# Patient Record
Sex: MALE | Race: OTHER | HISPANIC OR LATINO
[De-identification: names, ages, dates, MRNs, and addresses within clinical notes are randomized per-mention and may not be internally consistent; named-entity substitution may affect disease eponyms.]

---

## 2017-11-13 ENCOUNTER — APPOINTMENT (OUTPATIENT)
Dept: PULMONOLOGY | Facility: CLINIC | Age: 72
End: 2017-11-13
Payer: MEDICARE

## 2017-11-13 VITALS
SYSTOLIC BLOOD PRESSURE: 142 MMHG | OXYGEN SATURATION: 96 % | HEART RATE: 76 BPM | DIASTOLIC BLOOD PRESSURE: 78 MMHG | WEIGHT: 135 LBS | HEIGHT: 63 IN | BODY MASS INDEX: 23.92 KG/M2

## 2017-11-13 DIAGNOSIS — Z86.39 PERSONAL HISTORY OF OTHER ENDOCRINE, NUTRITIONAL AND METABOLIC DISEASE: ICD-10-CM

## 2017-11-13 DIAGNOSIS — Z82.0 FAMILY HISTORY OF EPILEPSY AND OTHER DISEASES OF THE NERVOUS SYSTEM: ICD-10-CM

## 2017-11-13 DIAGNOSIS — Z86.79 PERSONAL HISTORY OF OTHER DISEASES OF THE CIRCULATORY SYSTEM: ICD-10-CM

## 2017-11-13 DIAGNOSIS — Z82.49 FAMILY HISTORY OF ISCHEMIC HEART DISEASE AND OTHER DISEASES OF THE CIRCULATORY SYSTEM: ICD-10-CM

## 2017-11-13 PROCEDURE — 99215 OFFICE O/P EST HI 40 MIN: CPT | Mod: 25

## 2017-11-13 PROCEDURE — 94010 BREATHING CAPACITY TEST: CPT

## 2017-11-13 PROCEDURE — 94727 GAS DIL/WSHOT DETER LNG VOL: CPT

## 2017-11-13 PROCEDURE — 94664 DEMO&/EVAL PT USE INHALER: CPT

## 2017-11-13 PROCEDURE — 85018 HEMOGLOBIN: CPT | Mod: QW

## 2017-12-17 ENCOUNTER — FORM ENCOUNTER (OUTPATIENT)
Age: 72
End: 2017-12-17

## 2017-12-18 ENCOUNTER — APPOINTMENT (OUTPATIENT)
Dept: CT IMAGING | Facility: CLINIC | Age: 72
End: 2017-12-18
Payer: MEDICARE

## 2017-12-18 ENCOUNTER — OUTPATIENT (OUTPATIENT)
Dept: OUTPATIENT SERVICES | Facility: HOSPITAL | Age: 72
LOS: 1 days | End: 2017-12-18
Payer: MEDICARE

## 2017-12-18 DIAGNOSIS — J84.10 PULMONARY FIBROSIS, UNSPECIFIED: ICD-10-CM

## 2017-12-18 PROCEDURE — 71250 CT THORAX DX C-: CPT

## 2017-12-18 PROCEDURE — 71250 CT THORAX DX C-: CPT | Mod: 26

## 2018-02-12 ENCOUNTER — APPOINTMENT (OUTPATIENT)
Dept: PULMONOLOGY | Facility: CLINIC | Age: 73
End: 2018-02-12
Payer: MEDICARE

## 2018-02-12 VITALS
OXYGEN SATURATION: 96 % | WEIGHT: 140 LBS | BODY MASS INDEX: 24.8 KG/M2 | DIASTOLIC BLOOD PRESSURE: 82 MMHG | HEART RATE: 87 BPM | SYSTOLIC BLOOD PRESSURE: 122 MMHG

## 2018-02-12 PROCEDURE — 99214 OFFICE O/P EST MOD 30 MIN: CPT

## 2018-03-13 ENCOUNTER — CHART COPY (OUTPATIENT)
Age: 73
End: 2018-03-13

## 2018-05-15 ENCOUNTER — CHART COPY (OUTPATIENT)
Age: 73
End: 2018-05-15

## 2018-08-27 ENCOUNTER — OTHER (OUTPATIENT)
Age: 73
End: 2018-08-27

## 2020-11-10 ENCOUNTER — APPOINTMENT (OUTPATIENT)
Dept: PULMONOLOGY | Facility: CLINIC | Age: 75
End: 2020-11-10
Payer: MEDICARE

## 2020-11-10 VITALS — RESPIRATION RATE: 16 BRPM | HEART RATE: 105 BPM | OXYGEN SATURATION: 93 %

## 2020-11-10 VITALS — DIASTOLIC BLOOD PRESSURE: 70 MMHG | SYSTOLIC BLOOD PRESSURE: 120 MMHG | BODY MASS INDEX: 20.02 KG/M2 | WEIGHT: 113 LBS

## 2020-11-10 PROCEDURE — 99215 OFFICE O/P EST HI 40 MIN: CPT

## 2020-12-10 ENCOUNTER — APPOINTMENT (OUTPATIENT)
Dept: PULMONOLOGY | Facility: CLINIC | Age: 75
End: 2020-12-10

## 2020-12-15 ENCOUNTER — APPOINTMENT (OUTPATIENT)
Dept: PULMONOLOGY | Facility: CLINIC | Age: 75
End: 2020-12-15

## 2020-12-15 ENCOUNTER — NON-APPOINTMENT (OUTPATIENT)
Age: 75
End: 2020-12-15

## 2021-01-06 ENCOUNTER — APPOINTMENT (OUTPATIENT)
Dept: PULMONOLOGY | Facility: CLINIC | Age: 76
End: 2021-01-06
Payer: MEDICARE

## 2021-01-06 VITALS — HEART RATE: 85 BPM | OXYGEN SATURATION: 95 %

## 2021-01-06 PROCEDURE — 99214 OFFICE O/P EST MOD 30 MIN: CPT

## 2021-01-08 ENCOUNTER — OUTPATIENT (OUTPATIENT)
Dept: OUTPATIENT SERVICES | Facility: HOSPITAL | Age: 76
LOS: 1 days | End: 2021-01-08
Payer: MEDICARE

## 2021-01-08 ENCOUNTER — APPOINTMENT (OUTPATIENT)
Dept: CT IMAGING | Facility: CLINIC | Age: 76
End: 2021-01-08
Payer: MEDICARE

## 2021-01-08 DIAGNOSIS — J84.10 PULMONARY FIBROSIS, UNSPECIFIED: ICD-10-CM

## 2021-01-08 PROCEDURE — 71250 CT THORAX DX C-: CPT

## 2021-01-08 PROCEDURE — 71250 CT THORAX DX C-: CPT | Mod: 26

## 2021-02-17 ENCOUNTER — APPOINTMENT (OUTPATIENT)
Dept: PULMONOLOGY | Facility: CLINIC | Age: 76
End: 2021-02-17

## 2021-03-03 ENCOUNTER — APPOINTMENT (OUTPATIENT)
Dept: PULMONOLOGY | Facility: CLINIC | Age: 76
End: 2021-03-03

## 2021-03-10 ENCOUNTER — APPOINTMENT (OUTPATIENT)
Dept: PULMONOLOGY | Facility: CLINIC | Age: 76
End: 2021-03-10
Payer: MEDICARE

## 2021-03-10 VITALS — SYSTOLIC BLOOD PRESSURE: 116 MMHG | DIASTOLIC BLOOD PRESSURE: 64 MMHG

## 2021-03-10 VITALS — WEIGHT: 107 LBS | BODY MASS INDEX: 18.95 KG/M2

## 2021-03-10 VITALS — HEART RATE: 85 BPM | OXYGEN SATURATION: 93 %

## 2021-03-10 PROCEDURE — 99214 OFFICE O/P EST MOD 30 MIN: CPT

## 2021-04-20 ENCOUNTER — NON-APPOINTMENT (OUTPATIENT)
Age: 76
End: 2021-04-20

## 2021-04-23 ENCOUNTER — APPOINTMENT (OUTPATIENT)
Dept: PULMONOLOGY | Facility: CLINIC | Age: 76
End: 2021-04-23

## 2021-11-07 ENCOUNTER — INPATIENT (INPATIENT)
Facility: HOSPITAL | Age: 76
LOS: 7 days | Discharge: NURSING, REHAB OR LONG-TERM CARE | End: 2021-11-15
Attending: HOSPITALIST | Admitting: HOSPITALIST
Payer: MEDICARE

## 2021-11-07 VITALS
TEMPERATURE: 98 F | SYSTOLIC BLOOD PRESSURE: 120 MMHG | HEART RATE: 114 BPM | DIASTOLIC BLOOD PRESSURE: 84 MMHG | OXYGEN SATURATION: 98 % | RESPIRATION RATE: 30 BRPM

## 2021-11-07 DIAGNOSIS — R06.02 SHORTNESS OF BREATH: ICD-10-CM

## 2021-11-07 LAB
ALBUMIN SERPL ELPH-MCNC: 3.8 G/DL — SIGNIFICANT CHANGE UP (ref 3.3–5)
ALP SERPL-CCNC: 71 U/L — SIGNIFICANT CHANGE UP (ref 40–120)
ALT FLD-CCNC: 12 U/L — SIGNIFICANT CHANGE UP (ref 4–41)
ANION GAP SERPL CALC-SCNC: 11 MMOL/L — SIGNIFICANT CHANGE UP (ref 7–14)
AST SERPL-CCNC: 18 U/L — SIGNIFICANT CHANGE UP (ref 4–40)
B PERT DNA SPEC QL NAA+PROBE: SIGNIFICANT CHANGE UP
B PERT+PARAPERT DNA PNL SPEC NAA+PROBE: SIGNIFICANT CHANGE UP
BASE EXCESS BLDV CALC-SCNC: 4.8 MMOL/L — HIGH (ref -2–3)
BASE EXCESS BLDV CALC-SCNC: 8.1 MMOL/L — HIGH (ref -2–3)
BASOPHILS # BLD AUTO: 0.03 K/UL — SIGNIFICANT CHANGE UP (ref 0–0.2)
BASOPHILS NFR BLD AUTO: 0.3 % — SIGNIFICANT CHANGE UP (ref 0–2)
BILIRUB SERPL-MCNC: 0.6 MG/DL — SIGNIFICANT CHANGE UP (ref 0.2–1.2)
BLOOD GAS ARTERIAL COMPREHENSIVE RESULT: SIGNIFICANT CHANGE UP
BLOOD GAS VENOUS COMPREHENSIVE RESULT: SIGNIFICANT CHANGE UP
BLOOD GAS VENOUS COMPREHENSIVE RESULT: SIGNIFICANT CHANGE UP
BORDETELLA PARAPERTUSSIS (RAPRVP): SIGNIFICANT CHANGE UP
BUN SERPL-MCNC: 18 MG/DL — SIGNIFICANT CHANGE UP (ref 7–23)
C PNEUM DNA SPEC QL NAA+PROBE: SIGNIFICANT CHANGE UP
CALCIUM SERPL-MCNC: 9.2 MG/DL — SIGNIFICANT CHANGE UP (ref 8.4–10.5)
CHLORIDE BLDV-SCNC: 103 MMOL/L — SIGNIFICANT CHANGE UP (ref 96–108)
CHLORIDE BLDV-SCNC: 99 MMOL/L — SIGNIFICANT CHANGE UP (ref 96–108)
CHLORIDE SERPL-SCNC: 102 MMOL/L — SIGNIFICANT CHANGE UP (ref 98–107)
CO2 BLDV-SCNC: 33 MMOL/L — HIGH (ref 22–26)
CO2 BLDV-SCNC: 37.3 MMOL/L — HIGH (ref 22–26)
CO2 SERPL-SCNC: 29 MMOL/L — SIGNIFICANT CHANGE UP (ref 22–31)
CREAT SERPL-MCNC: 0.78 MG/DL — SIGNIFICANT CHANGE UP (ref 0.5–1.3)
EOSINOPHIL # BLD AUTO: 0.17 K/UL — SIGNIFICANT CHANGE UP (ref 0–0.5)
EOSINOPHIL NFR BLD AUTO: 1.9 % — SIGNIFICANT CHANGE UP (ref 0–6)
FLUAV SUBTYP SPEC NAA+PROBE: SIGNIFICANT CHANGE UP
FLUBV RNA SPEC QL NAA+PROBE: SIGNIFICANT CHANGE UP
GAS PNL BLDV: 136 MMOL/L — SIGNIFICANT CHANGE UP (ref 136–145)
GAS PNL BLDV: 139 MMOL/L — SIGNIFICANT CHANGE UP (ref 136–145)
GLUCOSE BLDV-MCNC: 122 MG/DL — HIGH (ref 70–99)
GLUCOSE BLDV-MCNC: 151 MG/DL — HIGH (ref 70–99)
GLUCOSE SERPL-MCNC: 125 MG/DL — HIGH (ref 70–99)
HADV DNA SPEC QL NAA+PROBE: SIGNIFICANT CHANGE UP
HCO3 BLDV-SCNC: 31 MMOL/L — HIGH (ref 22–29)
HCO3 BLDV-SCNC: 36 MMOL/L — HIGH (ref 22–29)
HCOV 229E RNA SPEC QL NAA+PROBE: SIGNIFICANT CHANGE UP
HCOV HKU1 RNA SPEC QL NAA+PROBE: SIGNIFICANT CHANGE UP
HCOV NL63 RNA SPEC QL NAA+PROBE: SIGNIFICANT CHANGE UP
HCOV OC43 RNA SPEC QL NAA+PROBE: SIGNIFICANT CHANGE UP
HCT VFR BLD CALC: 42.6 % — SIGNIFICANT CHANGE UP (ref 39–50)
HCT VFR BLDA CALC: 43 % — SIGNIFICANT CHANGE UP (ref 39–51)
HCT VFR BLDA CALC: 43 % — SIGNIFICANT CHANGE UP (ref 39–51)
HGB BLD CALC-MCNC: 14.2 G/DL — SIGNIFICANT CHANGE UP (ref 13–17)
HGB BLD CALC-MCNC: 14.3 G/DL — SIGNIFICANT CHANGE UP (ref 13–17)
HGB BLD-MCNC: 14.1 G/DL — SIGNIFICANT CHANGE UP (ref 13–17)
HMPV RNA SPEC QL NAA+PROBE: SIGNIFICANT CHANGE UP
HPIV1 RNA SPEC QL NAA+PROBE: SIGNIFICANT CHANGE UP
HPIV2 RNA SPEC QL NAA+PROBE: SIGNIFICANT CHANGE UP
HPIV3 RNA SPEC QL NAA+PROBE: SIGNIFICANT CHANGE UP
HPIV4 RNA SPEC QL NAA+PROBE: SIGNIFICANT CHANGE UP
IANC: 6.83 K/UL — SIGNIFICANT CHANGE UP (ref 1.5–8.5)
IMM GRANULOCYTES NFR BLD AUTO: 0.3 % — SIGNIFICANT CHANGE UP (ref 0–1.5)
LACTATE BLDV-MCNC: 1.8 MMOL/L — SIGNIFICANT CHANGE UP (ref 0.5–2)
LACTATE BLDV-MCNC: 3 MMOL/L — HIGH (ref 0.5–2)
LYMPHOCYTES # BLD AUTO: 1.08 K/UL — SIGNIFICANT CHANGE UP (ref 1–3.3)
LYMPHOCYTES # BLD AUTO: 12.2 % — LOW (ref 13–44)
M PNEUMO DNA SPEC QL NAA+PROBE: SIGNIFICANT CHANGE UP
MAGNESIUM SERPL-MCNC: 1.9 MG/DL — SIGNIFICANT CHANGE UP (ref 1.6–2.6)
MCHC RBC-ENTMCNC: 30.3 PG — SIGNIFICANT CHANGE UP (ref 27–34)
MCHC RBC-ENTMCNC: 33.1 GM/DL — SIGNIFICANT CHANGE UP (ref 32–36)
MCV RBC AUTO: 91.4 FL — SIGNIFICANT CHANGE UP (ref 80–100)
MONOCYTES # BLD AUTO: 0.71 K/UL — SIGNIFICANT CHANGE UP (ref 0–0.9)
MONOCYTES NFR BLD AUTO: 8 % — SIGNIFICANT CHANGE UP (ref 2–14)
NEUTROPHILS # BLD AUTO: 6.83 K/UL — SIGNIFICANT CHANGE UP (ref 1.8–7.4)
NEUTROPHILS NFR BLD AUTO: 77.3 % — HIGH (ref 43–77)
NRBC # BLD: 0 /100 WBCS — SIGNIFICANT CHANGE UP
NRBC # FLD: 0 K/UL — SIGNIFICANT CHANGE UP
PCO2 BLDV: 53 MMHG — SIGNIFICANT CHANGE UP (ref 42–55)
PCO2 BLDV: 60 MMHG — HIGH (ref 42–55)
PH BLDV: 7.38 — SIGNIFICANT CHANGE UP (ref 7.32–7.43)
PH BLDV: 7.38 — SIGNIFICANT CHANGE UP (ref 7.32–7.43)
PHOSPHATE SERPL-MCNC: 3.2 MG/DL — SIGNIFICANT CHANGE UP (ref 2.5–4.5)
PLATELET # BLD AUTO: 198 K/UL — SIGNIFICANT CHANGE UP (ref 150–400)
PO2 BLDV: 23 MMHG — SIGNIFICANT CHANGE UP
PO2 BLDV: 43 MMHG — SIGNIFICANT CHANGE UP
POTASSIUM BLDV-SCNC: 3.6 MMOL/L — SIGNIFICANT CHANGE UP (ref 3.5–5.1)
POTASSIUM BLDV-SCNC: 3.9 MMOL/L — SIGNIFICANT CHANGE UP (ref 3.5–5.1)
POTASSIUM SERPL-MCNC: 4.2 MMOL/L — SIGNIFICANT CHANGE UP (ref 3.5–5.3)
POTASSIUM SERPL-SCNC: 4.2 MMOL/L — SIGNIFICANT CHANGE UP (ref 3.5–5.3)
PROT SERPL-MCNC: 7.1 G/DL — SIGNIFICANT CHANGE UP (ref 6–8.3)
RAPID RVP RESULT: SIGNIFICANT CHANGE UP
RBC # BLD: 4.66 M/UL — SIGNIFICANT CHANGE UP (ref 4.2–5.8)
RBC # FLD: 12.5 % — SIGNIFICANT CHANGE UP (ref 10.3–14.5)
RSV RNA SPEC QL NAA+PROBE: SIGNIFICANT CHANGE UP
RV+EV RNA SPEC QL NAA+PROBE: SIGNIFICANT CHANGE UP
SAO2 % BLDV: 36.2 % — SIGNIFICANT CHANGE UP
SAO2 % BLDV: 75.1 % — SIGNIFICANT CHANGE UP
SARS-COV-2 RNA SPEC QL NAA+PROBE: SIGNIFICANT CHANGE UP
SODIUM SERPL-SCNC: 142 MMOL/L — SIGNIFICANT CHANGE UP (ref 135–145)
TROPONIN T, HIGH SENSITIVITY RESULT: 14 NG/L — SIGNIFICANT CHANGE UP
TROPONIN T, HIGH SENSITIVITY RESULT: 15 NG/L — SIGNIFICANT CHANGE UP
WBC # BLD: 8.85 K/UL — SIGNIFICANT CHANGE UP (ref 3.8–10.5)
WBC # FLD AUTO: 8.85 K/UL — SIGNIFICANT CHANGE UP (ref 3.8–10.5)

## 2021-11-07 PROCEDURE — 99223 1ST HOSP IP/OBS HIGH 75: CPT

## 2021-11-07 PROCEDURE — 71275 CT ANGIOGRAPHY CHEST: CPT | Mod: 26,MA

## 2021-11-07 PROCEDURE — 99285 EMERGENCY DEPT VISIT HI MDM: CPT | Mod: CS,25

## 2021-11-07 PROCEDURE — 71045 X-RAY EXAM CHEST 1 VIEW: CPT | Mod: 26

## 2021-11-07 PROCEDURE — 93010 ELECTROCARDIOGRAM REPORT: CPT

## 2021-11-07 RX ORDER — HALOPERIDOL DECANOATE 100 MG/ML
2.5 INJECTION INTRAMUSCULAR ONCE
Refills: 0 | Status: COMPLETED | OUTPATIENT
Start: 2021-11-07 | End: 2021-11-07

## 2021-11-07 RX ORDER — IPRATROPIUM/ALBUTEROL SULFATE 18-103MCG
3 AEROSOL WITH ADAPTER (GRAM) INHALATION ONCE
Refills: 0 | Status: COMPLETED | OUTPATIENT
Start: 2021-11-07 | End: 2021-11-07

## 2021-11-07 RX ORDER — FUROSEMIDE 40 MG
40 TABLET ORAL ONCE
Refills: 0 | Status: COMPLETED | OUTPATIENT
Start: 2021-11-07 | End: 2021-11-07

## 2021-11-07 RX ORDER — HALOPERIDOL DECANOATE 100 MG/ML
2.5 INJECTION INTRAMUSCULAR ONCE
Refills: 0 | Status: DISCONTINUED | OUTPATIENT
Start: 2021-11-07 | End: 2021-11-07

## 2021-11-07 RX ADMIN — Medication 40 MILLIGRAM(S): at 15:18

## 2021-11-07 RX ADMIN — HALOPERIDOL DECANOATE 2.5 MILLIGRAM(S): 100 INJECTION INTRAMUSCULAR at 19:47

## 2021-11-07 RX ADMIN — Medication 3 MILLILITER(S): at 14:55

## 2021-11-07 RX ADMIN — HALOPERIDOL DECANOATE 2.5 MILLIGRAM(S): 100 INJECTION INTRAMUSCULAR at 19:30

## 2021-11-07 RX ADMIN — HALOPERIDOL DECANOATE 2.5 MILLIGRAM(S): 100 INJECTION INTRAMUSCULAR at 20:24

## 2021-11-07 RX ADMIN — HALOPERIDOL DECANOATE 2.5 MILLIGRAM(S): 100 INJECTION INTRAMUSCULAR at 21:19

## 2021-11-07 NOTE — ED PROVIDER NOTE - PHYSICAL EXAMINATION
GENERAL: Awake, alert, cachectic   HEENT: NC/AT, moist mucous membranes, PERRL, EOMI  LUNGS: CTAB, no wheezes or crackles, tachypneic, increased WOB  CARDIAC: Tachycardic, no m/r/g  ABDOMEN: Soft, normal BS, non tender, non distended, no rebound, no guarding  BACK: No midline spinal tenderness, no CVA tenderness  EXT: No edema, no calf tenderness, 2+ DP pulses bilaterally, no deformities.  NEURO: A&Ox3. Moving all extremities.  SKIN: Warm and dry. No rash.  PSYCH: Normal affect.

## 2021-11-07 NOTE — ED PROVIDER NOTE - ATTENDING CONTRIBUTION TO CARE
75M w h/o copd, htn, mdd, bph, GERD p/w 2 days of worsening shortness of breath. As per daughter at bedside, patient nursing home [facility] for a unknown 'lung issue.' States patient left AMA 2 days ago. States patient ran out of home O2 today and acutely had worsening of sob today, prompting ED visit. Denies chest pain, n/v/d, abdominal pain, lower extremity swelling.     Except as documented in the HPI,  all other systems are negative    CONSTITUTIONAL: cachetic appearing, awake, alert  HEAD: Normocephalic; atraumatic  ENMT: External appears normal, MMM  NECK: no tenderness, FROM  CARD: Normal Sl, S2; no audible murmurs  RESP: tachypnea, sating well on 6L, coarse breath sounds throughout   ABD: soft, non-distended; non-tender  MSK: no edema, normal ROM in all four extremities  SKIN: Warm, dry, no rashes  NEURO: aaox3, moving all extremities spontaneously    75M w h/o copd, htn, mdd, bph, GERD p/w 2 days of worsening shortness of breath, unclear what his lung disease is but patient w/ coarse breath sounds throughout, will cta to r/o PE and assess for interstitial lung disease, acs workup, no neuro deficits, no chest pain, pulses intact, low suspicion for dissection, no pedal edema

## 2021-11-07 NOTE — ED ADULT TRIAGE NOTE - CHIEF COMPLAINT QUOTE
Pt. brought in by family for SOB. States they took him from [facility] to see family today and noticed his oxygen tank ran out. Pt. arrived tachypneic and sating 65 on RA. Placed on 6L NC and now sating 98. Family asking to speak with .     Scarlett Bryant (daughter) [phone]  Dmitryjoni Manny (daughter) [phone]

## 2021-11-07 NOTE — ED ADULT NURSE REASSESSMENT NOTE - NS ED NURSE REASSESS COMMENT FT1
awake cooperative but keeps taking off bipap mask becomes very SOB desaturates  HR goes to 140s  RR 50s Dr Huang aware medicated with haldol as ordered   bipap 12/6 40%  Right 20g heplock intact

## 2021-11-07 NOTE — CONSULT NOTE ADULT - SUBJECTIVE AND OBJECTIVE BOX
CHIEF COMPLAINT:     HPI: 75 year old M with PMH COPD, HTN, MDD, BPH and GERD presenting with SOB x2 days. Patient lives at [facility] and was checked out from facility to attend a family event. At event, noticed patient's oxygen tank had "run out." Took patient from SNF against medical advice. Patient notes two days of SOB, worse with exertion. Denies CP, N/V/D, cough, palpitations, abdominal pain, LE edema. Patient noted to be hypoxic in triage to 65, placed on 6L NC with improvement. However, was still tachycardic and tachypneic during evaluation. (ED Note)    In ED, patient placed on 6L NC and then escalated to BIPAP for persistent tachypnea.      FAMILY HISTORY:  Denies family history of lung diseases      SOCIAL HISTORY:  States smokes 1 to 2 cigarettes 30 years ago. Patient states lives with daughter but per ED note patient lives at [facility].      Allergies    No Known Allergies    Intolerances        HOME MEDICATIONS:    REVIEW OF SYSTEMS:  Constitutional:   Eyes:  ENT:  CV:  Resp:  GI:  :  MSK:  Integumentary:  Neurological:  Psychiatric:  Endocrine:  Hematologic/Lymphatic:  Allergic/Immunologic:  [ ] All other systems negative  [ ] Unable to assess ROS because ________    OBJECTIVE:  ICU Vital Signs Last 24 Hrs  T(C): 36.9 (07 Nov 2021 19:00), Max: 36.9 (07 Nov 2021 19:00)  T(F): 98.4 (07 Nov 2021 19:00), Max: 98.4 (07 Nov 2021 19:00)  HR: 84 (07 Nov 2021 19:00) (84 - 114)  BP: 127/79 (07 Nov 2021 19:00) (120/84 - 127/79)  BP(mean): --  ABP: --  ABP(mean): --  RR: 22 (07 Nov 2021 19:00) (22 - 40)  SpO2: 98% (07 Nov 2021 19:00) (96% - 98%)        CAPILLARY BLOOD GLUCOSE          PHYSICAL EXAM:  General:   HEENT:   Lymph Nodes:  Neck:   Respiratory:   Cardiovascular:   Abdomen:   Extremities:   Skin:   Neurological:  Psychiatry:    HOSPITAL MEDICATIONS:  MEDICATIONS  (STANDING):  haloperidol    Injectable 2.5 milliGRAM(s) IV Push Once    MEDICATIONS  (PRN):      LABS:                        14.1   8.85  )-----------( 198      ( 07 Nov 2021 13:24 )             42.6     11-07    142  |  102  |  18  ----------------------------<  125<H>  4.2   |  29  |  0.78    Ca    9.2      07 Nov 2021 13:24  Phos  3.2     11-07  Mg     1.90     11-07    TPro  7.1  /  Alb  3.8  /  TBili  0.6  /  DBili  x   /  AST  18  /  ALT  12  /  AlkPhos  71  11-07        Arterial Blood Gas:  11-07 @ 14:41  7.45/45/96/31/98.1/6.4  ABG lactate: --    Venous Blood Gas:  11-07 @ 13:24  7.38/60/23/36/36.2  VBG Lactate: 1.8      MICROBIOLOGY:     RADIOLOGY:  [ ] Reviewed and interpreted by me    EKG: CHIEF COMPLAINT:     HPI: 75 year old M with PMH COPD, HTN, MDD, BPH and GERD presenting with SOB x2 days. Patient lives at [facility] and was checked out from facility to attend a family event. At event, noticed patient's oxygen tank had "run out." Took patient from SNF against medical advice. Patient notes two days of SOB, worse with exertion. Denies CP, N/V/D, cough, palpitations, abdominal pain, LE edema. Patient noted to be hypoxic in triage to 65, placed on 6L NC with improvement. However, was still tachycardic and tachypneic during evaluation. (ED Note)    In ED, patient placed on 6L NC and then escalated to BIPAP for persistent tachypnea. CT findings concerning for ILD.      FAMILY HISTORY:  Denies family history of lung diseases    SOCIAL HISTORY:  States smokes 1 to 2 cigarettes 30 years ago. Patient states lives with daughter but per ED note patient lives at [facility].    Allergies  No Known Allergies  Intolerances      HOME MEDICATIONS:    REVIEW OF SYSTEMS:  CONSTITUTIONAL: Negative for fever, chills, fatigue, recent weight changes  ENT/MOUTH: Negative for hearing difficulties, ear pain, sore throat, rhinorrhea  EYES: Negative for eye pain, vision changes  CARDIOVASCULAR: Negative for chest pain, palpitations, claudication, edema  RESPIRATORY: As per HPI  GASTROINTESTINAL: Negative for nausea, vomiting, diarrhea, constipation  GENITOURINARY: Negative for dysuria, decreased urinary frequency, hematuria  MUSCULOSKELETAL: Negative for joint pain, back pain, arthralgias, myalgias  SKIN: Negative for skin lesions, rashes, hair changes  NEUROLOGICAL: Negative for headache, weakness, numbness, paresthesias  PSYCHIATRIC: Negative for depression, anxiety  HEME/LYMPH: Negative for bruising, easy bleeding, lymphadenopathy  ENDOCRINE: Negative for polyuria, polydipsia, temperature intolerance  [x ] All other systems negative  [ ] Unable to assess ROS because ________    OBJECTIVE:  ICU Vital Signs Last 24 Hrs  T(C): 36.9 (07 Nov 2021 19:00), Max: 36.9 (07 Nov 2021 19:00)  T(F): 98.4 (07 Nov 2021 19:00), Max: 98.4 (07 Nov 2021 19:00)  General: Awake, alert, speaking full sentences, cachectic appearing  HEENT: Normocephalic, atraumatic. EOMI. BIPAP mask in place.  Pulmonary: Symmetric chest rise. Tachypnic but lungs clear to auscultation bilaterally.  Cardiovascular: Normal rate and regular rhythm. No murmurs/rubs/gallops  Abdominal: Soft, non-distended, non-tender  Skin: No evident rashes or lesions  Neurologic: No focal defects  Psychiatric: Mood appropriate    HR: 84 (07 Nov 2021 19:00) (84 - 114)  BP: 127/79 (07 Nov 2021 19:00) (120/84 - 127/79)  BP(mean): --  ABP: --  ABP(mean): --  RR: 22 (07 Nov 2021 19:00) (22 - 40)  SpO2: 98% (07 Nov 2021 19:00) (96% - 98%)    HOSPITAL MEDICATIONS:  MEDICATIONS  (STANDING):  haloperidol    Injectable 2.5 milliGRAM(s) IV Push Once    MEDICATIONS  (PRN):      LABS:                        14.1   8.85  )-----------( 198      ( 07 Nov 2021 13:24 )             42.6     11-07    142  |  102  |  18  ----------------------------<  125<H>  4.2   |  29  |  0.78    Ca    9.2      07 Nov 2021 13:24  Phos  3.2     11-07  Mg     1.90     11-07    TPro  7.1  /  Alb  3.8  /  TBili  0.6  /  DBili  x   /  AST  18  /  ALT  12  /  AlkPhos  71  11-07    Arterial Blood Gas:  11-07 @ 14:41  7.45/45/96/31/98.1/6.4  ABG lactate: --    Venous Blood Gas:  11-07 @ 13:24  7.38/60/23/36/36.2  VBG Lactate: 1.8    MICROBIOLOGY:   11/7 RVP and COVID - negative    RADIOLOGY:   CTA Chest (11/7):  No main, right, left, lobar or proximal segmental pulmonary embolism. Limited evaluation of the distal segmental and subsegmental branches due to obscuration by respiratory motion.  Fibrosing interstitial lung disease, most consistent with NSIP. The predilection for the anterior upper lobes raises the possibility of connective tissue disease associated ILD.  Indeterminate nodular thickening of the major fissures, possibly related to fibrosis. 3 month follow-up is recommended to assess for stability.  Mild esophageal dilatation.    EKG 11/7: Sinus tachycardia but significant noise CHIEF COMPLAINT:     HPI: 75 year old M with PMH COPD, HTN, MDD, BPH and GERD presenting with SOB x2 days. Patient lives at [facility] and was checked out from facility to attend a family event. At event, noticed patient's oxygen tank had "run out." Took patient from SNF against medical advice. Patient notes two days of SOB, worse with exertion. Denies CP, N/V/D, cough, palpitations, abdominal pain, LE edema. Patient noted to be hypoxic in triage to 65, placed on 6L NC with improvement. However, was still tachycardic and tachypneic during evaluation. (ED Note)    In ED, patient placed on 6L NC and then escalated to BIPAP for persistent tachypnea. Had CT findings concerning for ILD and blood gases with evidence of chronic hypercapnia and EKG with sinus tachycardia though significant noise. Patient on interview, speaking full sentences, stating breathing much improved on BIPAP. Only complaint is feeling thirsty and requesting water. Denies chest pain, recent fevers, sick contacts. Reports completing 2 dose COVID vaccination series several months ago.    FAMILY HISTORY:  Denies family history of lung diseases    SOCIAL HISTORY:  States smokes 1 to 2 cigarettes 30 years ago. Patient states lives with daughter but per ED note patient lives at [facility].    Allergies  No Known Allergies  Intolerances      HOME MEDICATIONS:    REVIEW OF SYSTEMS:  CONSTITUTIONAL: Negative for fever, chills, fatigue, recent weight changes  ENT/MOUTH: Negative for hearing difficulties, ear pain, sore throat, rhinorrhea  EYES: Negative for eye pain, vision changes  CARDIOVASCULAR: Negative for chest pain, palpitations, claudication, edema  RESPIRATORY: As per HPI  GASTROINTESTINAL: Negative for nausea, vomiting, diarrhea, constipation  GENITOURINARY: Negative for dysuria, decreased urinary frequency, hematuria  MUSCULOSKELETAL: Negative for joint pain, back pain, arthralgias, myalgias  SKIN: Negative for skin lesions, rashes, hair changes  NEUROLOGICAL: Negative for headache, weakness, numbness, paresthesias  PSYCHIATRIC: Negative for depression, anxiety  HEME/LYMPH: Negative for bruising, easy bleeding, lymphadenopathy  ENDOCRINE: Negative for polyuria, polydipsia, temperature intolerance  [x ] All other systems negative  [ ] Unable to assess ROS because ________    OBJECTIVE:  ICU Vital Signs Last 24 Hrs  T(C): 36.9 (07 Nov 2021 19:00), Max: 36.9 (07 Nov 2021 19:00)  T(F): 98.4 (07 Nov 2021 19:00), Max: 98.4 (07 Nov 2021 19:00)  General: Awake, alert, speaking full sentences, cachectic appearing  HEENT: Normocephalic, atraumatic. EOMI. BIPAP mask in place.  Pulmonary: Symmetric chest rise. Tachypnic but lungs clear to auscultation bilaterally.  Cardiovascular: Normal rate and regular rhythm. No murmurs/rubs/gallops  Abdominal: Soft, non-distended, non-tender  Skin: No evident rashes or lesions  Neurologic: No focal defects  Psychiatric: Mood appropriate    HR: 84 (07 Nov 2021 19:00) (84 - 114)  BP: 127/79 (07 Nov 2021 19:00) (120/84 - 127/79)  BP(mean): --  ABP: --  ABP(mean): --  RR: 22 (07 Nov 2021 19:00) (22 - 40)  SpO2: 98% (07 Nov 2021 19:00) (96% - 98%)    HOSPITAL MEDICATIONS:  MEDICATIONS  (STANDING):  haloperidol    Injectable 2.5 milliGRAM(s) IV Push Once    MEDICATIONS  (PRN):      LABS:                        14.1   8.85  )-----------( 198      ( 07 Nov 2021 13:24 )             42.6     11-07    142  |  102  |  18  ----------------------------<  125<H>  4.2   |  29  |  0.78    Ca    9.2      07 Nov 2021 13:24  Phos  3.2     11-07  Mg     1.90     11-07    TPro  7.1  /  Alb  3.8  /  TBili  0.6  /  DBili  x   /  AST  18  /  ALT  12  /  AlkPhos  71  11-07    Arterial Blood Gas:  11-07 @ 14:41  7.45/45/96/31/98.1/6.4  ABG lactate: --    Venous Blood Gas:  11-07 @ 13:24  7.38/60/23/36/36.2  VBG Lactate: 1.8    MICROBIOLOGY:   11/7 RVP and COVID - negative    RADIOLOGY:   CTA Chest (11/7):  No main, right, left, lobar or proximal segmental pulmonary embolism. Limited evaluation of the distal segmental and subsegmental branches due to obscuration by respiratory motion.  Fibrosing interstitial lung disease, most consistent with NSIP. The predilection for the anterior upper lobes raises the possibility of connective tissue disease associated ILD.  Indeterminate nodular thickening of the major fissures, possibly related to fibrosis. 3 month follow-up is recommended to assess for stability.  Mild esophageal dilatation.    EKG 11/7: Sinus tachycardia but significant noise

## 2021-11-07 NOTE — ED PROVIDER NOTE - OBJECTIVE STATEMENT
75 year old M with PMH COPD, HTN, MDD, BPH and GERD presenting with SOB x2 days. Patient lives at [facility] and was checked out from facility to attend a family event. At event, noticed patient's oxygen tank had "run out." Took patient from SNF against medical advice. Patient notes two days of SOB, worse with exertion. Denies CP, N/V/D, cough, palpitations, abdominal pain, LE edema. Patient noted to be hypoxic in triage to 65, placed on 6L NC with improvement. However, was still tachycardic and tachypneic during evaluation.

## 2021-11-07 NOTE — ED ADULT NURSE NOTE - CHIEF COMPLAINT QUOTE
Pt. brought in by family for SOB. States they took him from [facility] to see family today and noticed his oxygen tank ran out. Pt. arrived tachypneic and sating 65 on RA. Placed on 6L NC and now sating 98. Family asking to speak with .     Scarlett Bryant (daughter) [phone]  Dmitryjoni Manny (daughter) [phone]

## 2021-11-07 NOTE — ED ADULT NURSE REASSESSMENT NOTE - NS ED NURSE REASSESS COMMENT FT1
pt on bipap at this time, transported to CT at this time with respiratory and PCA at bedside. pt noted to be attempting to take off bipap mask, appears anxious and tacypneic at this time. respirations noted to be labored. MD made aware, presented to bedside for eval. O2 sat noted to be high 70's on bipap on VS machine when moving onto CT bed, pt tachycardiac to 120's, pt repositioned and kept on bipap at this time. O2 sat improved to high 80's. denies CP, dizziness, lightheadedness. pt brought back to rm and placed on CM, sinus tachy. will continue to monitor.

## 2021-11-07 NOTE — ED PROVIDER NOTE - PROGRESS NOTE DETAILS
Jacinta Oliva PGY-2: Reassessed patient. Reports no change in symptoms on bipap. Still tachypneic in the 40s. ABG pending. Jacinta Oliva PGY-2: Called to CT scanner. Patient tachypneic and trying to rip off bipap mask. Had to urinate. Placed on SpO2 monitor. Tachypnea improved, satting in high 90s, low grade tachycardic to 110s. Jacinta Oliva PGY-2: MICU to see.

## 2021-11-07 NOTE — CONSULT NOTE ADULT - ASSESSMENT
- Patient improved on BIPAP, can consider weaning off  - CT findings suggestive  75 year old man with PMH COPD on home O2, HTN, GERD, BPH, MDD presenting with shortness of breath found to be hypoxic in ED in setting of running out of home O2 and CT findings concerning for ILD whom MICU consulted for new BIPAP.    - CT findings suggestive of ILD  - Patient improved on BIPAP, can consider trialing off as tolerated  - ABG on BIPAP 7.45 / pCO2 45 / pO2 96 / HCO3 31 likely in setting of chronic hypercapnia  - No indication for MICU admission at this time. Please reconsult as needed.    Bessie Armenta MD  PGY2 Medicine

## 2021-11-07 NOTE — ED ADULT NURSE NOTE - OBJECTIVE STATEMENT
74y/o male A&ox4, ambulatory received in rm20. pt c/o sob. as per daughter, pt came from nursing home, o2 tank ran out when she took him out of facility, found to satting 65% on RA in in triage, placed on 6L O2 NC satting 98% at this time. pt tachypneic to 50's at this time. respiratory notified and placed pt on bipap as per MD orders. denies chest pain, dizziness, lightheadedness, headache. hx of COPD, HTN. pt normotensive at this time. vs as noted in flowsheet. bed in lowest position, side rails up, call bell in hand, safety maintained. awaiting further orders. will continue to monitor.

## 2021-11-07 NOTE — ED ADULT NURSE NOTE - NS ED NURSE RECORD ANOTHER HT AND WT
Continue to monitor symptoms  If new or worsening symptoms develop, go immediately to Er  Drink plenty of fluids  Follow up with Family Doctor this week  Upper Respiratory Infection in Children   WHAT YOU NEED TO KNOW:   An upper respiratory infection is also called a cold  It can affect your child's nose, throat, ears, and sinuses  Most children get about 5 to 8 colds each year  Children get colds more often in winter  Your child's cold symptoms will be worst for the first 3 to 5 days  His or her cold should be gone in 7 to 14 days  Your child may continue to cough for 2 to 3 weeks  Colds are caused by viruses and do not get better with antibiotics  DISCHARGE INSTRUCTIONS:   Return to the emergency department if:   · Your child's temperature reaches 105°F (40 6°C)  · Your child has trouble breathing or is breathing faster than usual     · Your child's lips or nails turn blue  · Your child's nostrils flare when he or she takes a breath  · The skin above or below your child's ribs is sucked in with each breath  · Your child's heart is beating much faster than usual     · You see pinpoint or larger reddish-purple dots on your child's skin  · Your child stops urinating or urinates less than usual     · Your baby's soft spot on his or her head is bulging outward or sunken inward  · Your child has a severe headache or stiff neck  · Your child has chest or stomach pain  · Your baby is too weak to eat  Call your child's doctor if:   · Your child has a rectal, ear, or forehead temperature higher than 100 4°F (38°C)  · Your child has an oral or pacifier temperature higher than 100°F (37 8°C)  · Your child has an armpit temperature higher than 99°F (37 2°C)  · Your child is younger than 2 years and has a fever for more than 24 hours  · Your child is 2 years or older and has a fever for more than 72 hours  · Your child has had thick nasal drainage for more than 2 days      · Your child has ear pain  · Your child has white spots on his or her tonsils  · Your child coughs up a lot of thick, yellow, or green mucus  · Your child is unable to eat, has nausea, or is vomiting  · Your child has increased tiredness and weakness  · Your child's symptoms do not improve or get worse within 3 days  · You have questions or concerns about your child's condition or care  Medicines:  Do not give over-the-counter cough or cold medicines to children younger than 4 years  Your healthcare provider may tell you not to give these medicines to children younger than 6 years  OTC cough and cold medicines can cause side effects that may harm your child  Your child may need any of the following:  · Decongestants  help reduce nasal congestion in older children and help make breathing easier  If your child takes decongestant pills, they may make him or her feel restless or cause problems with sleep  Do not give your child decongestant sprays for more than a few days  · Cough suppressants  help reduce coughing in older children  Ask your child's healthcare provider which type of cough medicine is best for him or her  · Acetaminophen  decreases pain and fever  It is available without a doctor's order  Ask how much to give your child and how often to give it  Follow directions  Read the labels of all other medicines your child uses to see if they also contain acetaminophen, or ask your child's doctor or pharmacist  Acetaminophen can cause liver damage if not taken correctly  · NSAIDs , such as ibuprofen, help decrease swelling, pain, and fever  This medicine is available with or without a doctor's order  NSAIDs can cause stomach bleeding or kidney problems in certain people  If you take blood thinner medicine, always ask if NSAIDs are safe for you  Always read the medicine label and follow directions   Do not give these medicines to children under 10months of age without direction from your child's healthcare provider  · Do not give aspirin to children under 25years of age  Your child could develop Reye syndrome if he takes aspirin  Reye syndrome can cause life-threatening brain and liver damage  Check your child's medicine labels for aspirin, salicylates, or oil of wintergreen  · Give your child's medicine as directed  Contact your child's healthcare provider if you think the medicine is not working as expected  Tell him or her if your child is allergic to any medicine  Keep a current list of the medicines, vitamins, and herbs your child takes  Include the amounts, and when, how, and why they are taken  Bring the list or the medicines in their containers to follow-up visits  Carry your child's medicine list with you in case of an emergency  Care for your child:   · Have your child rest   Rest will help his or her body get better  · Give your child more liquids as directed  Liquids will help thin and loosen mucus so your child can cough it up  Liquids will also help prevent dehydration  Liquids that help prevent dehydration include water, fruit juice, and broth  Do not give your child liquids that contain caffeine  Caffeine can increase your child's risk for dehydration  Ask your child's healthcare provider how much liquid to give your child each day  · Clear mucus from your child's nose  Use a bulb syringe to remove mucus from a baby's nose  Squeeze the bulb and put the tip into one of your baby's nostrils  Gently close the other nostril with your finger  Slowly release the bulb to suck up the mucus  Empty the bulb syringe onto a tissue  Repeat the steps if needed  Do the same thing in the other nostril  Make sure your baby's nose is clear before he or she feeds or sleeps  Your child's healthcare provider may recommend you put saline drops into your baby's nose if the mucus is very thick  · Soothe your child's throat    If your child is 8 years or older, have him or her gargle with salt water  Make salt water by dissolving ¼ teaspoon salt in 1 cup warm water  · Soothe your child's cough  You can give honey to children older than 1 year  Give ½ teaspoon of honey to children 1 to 5 years  Give 1 teaspoon of honey to children 6 to 11 years  Give 2 teaspoons of honey to children 12 or older  · Use a cool-mist humidifier  This will add moisture to the air and help your child breathe easier  Make sure the humidifier is out of your child's reach  · Apply petroleum-based jelly around the outside of your child's nostrils  This can decrease irritation from blowing his or her nose  · Keep your child away from cigarette and cigar smoke  Do not smoke near your child  Do not let your older child smoke  Nicotine and other chemicals in cigarettes and cigars can make your child's symptoms worse  They can also cause infections such as bronchitis or pneumonia  Ask your child's healthcare provider for information if you or your child currently smoke and need help to quit  E-cigarettes or smokeless tobacco still contain nicotine  Talk to your healthcare provider before you or your child use these products  Prevent the spread of a cold:   · Have your child wash his her hands often  Teach your child to use soap and water every time  Show your child how to rub his or her soapy hands together, lacing the fingers  He or she should use the fingers of one hand to scrub under the nails of the other hand  Your child needs to wash his or her hands for at least 20 seconds  This is about the time it takes to sing the happy birthday song 2 times  Your child should rinse his or her hands with warm, running water for several seconds, then dry them with a clean towel  Tell your child to use germ-killing gel if soap and water are not available  Teach your child not to touch his or her eyes or mouth without washing first          · Show your child how to cover a sneeze or cough    Use a tissue that covers your child's mouth and nose  Teach him or her to put the used tissue in the trash right away  Use the bend of your arm if a tissue is not available  Wash your hands well with soap and water or use a hand   Do not stand close to anyone who is sneezing or coughing  · Keep your child home as directed  This is especially important during the first 2 to 3 days when the virus is more easily spread  Wait until a fever, cough, or other symptoms are gone before letting your child return to school, , or other activities  · Do not let your child share items while he or she is sick  This includes toys, pacifiers, and towels  Do not let your child share food, eating utensils, drinks, or cups with anyone  Follow up with your child's doctor as directed:  Write down your questions so you remember to ask them during your visits  © Copyright [address] Information is for End User's use only and may not be sold, redistributed or otherwise used for commercial purposes  All illustrations and images included in CareNotes® are the copyrighted property of [employer]  or [facility] Robyn Cabral   The above information is an  only  It is not intended as medical advice for individual conditions or treatments  Talk to your doctor, nurse or pharmacist before following any medical regimen to see if it is safe and effective for you  No

## 2021-11-07 NOTE — ED PROVIDER NOTE - CLINICAL SUMMARY MEDICAL DECISION MAKING FREE TEXT BOX
75 year old M with PMH COPD, HTN, MDD, BPH and GERD presenting with SOB x2 days. Patient cachectic, tachypneic to 40s, tachy to 120s, but afebrile. Placed on BiPAP. EKG non ischemic. Mentating appropriately. Concern for PE vs CHF vs COPD exacerbation. Will get labs, CXR, keep on Bipap, admit.

## 2021-11-07 NOTE — ED ADULT NURSE REASSESSMENT NOTE - NS ED NURSE REASSESS COMMENT FT1
pt taking off bipap mask at this time. pt remains 98% on bipap. pt educated to keep mask on. pt remains tachypneic to 40 at this time. haldol administered as per MD orders. will reassess.

## 2021-11-08 DIAGNOSIS — J96.21 ACUTE AND CHRONIC RESPIRATORY FAILURE WITH HYPOXIA: ICD-10-CM

## 2021-11-08 DIAGNOSIS — F32.9 MAJOR DEPRESSIVE DISORDER, SINGLE EPISODE, UNSPECIFIED: ICD-10-CM

## 2021-11-08 DIAGNOSIS — N40.0 BENIGN PROSTATIC HYPERPLASIA WITHOUT LOWER URINARY TRACT SYMPTOMS: ICD-10-CM

## 2021-11-08 DIAGNOSIS — R56.9 UNSPECIFIED CONVULSIONS: ICD-10-CM

## 2021-11-08 DIAGNOSIS — I10 ESSENTIAL (PRIMARY) HYPERTENSION: ICD-10-CM

## 2021-11-08 DIAGNOSIS — Z29.9 ENCOUNTER FOR PROPHYLACTIC MEASURES, UNSPECIFIED: ICD-10-CM

## 2021-11-08 DIAGNOSIS — K21.9 GASTRO-ESOPHAGEAL REFLUX DISEASE WITHOUT ESOPHAGITIS: ICD-10-CM

## 2021-11-08 DIAGNOSIS — J44.9 CHRONIC OBSTRUCTIVE PULMONARY DISEASE, UNSPECIFIED: ICD-10-CM

## 2021-11-08 LAB
A1C WITH ESTIMATED AVERAGE GLUCOSE RESULT: 5.5 % — SIGNIFICANT CHANGE UP (ref 4–5.6)
ALBUMIN SERPL ELPH-MCNC: 3.9 G/DL — SIGNIFICANT CHANGE UP (ref 3.3–5)
ALP SERPL-CCNC: 77 U/L — SIGNIFICANT CHANGE UP (ref 40–120)
ALT FLD-CCNC: 11 U/L — SIGNIFICANT CHANGE UP (ref 4–41)
ANION GAP SERPL CALC-SCNC: 16 MMOL/L — HIGH (ref 7–14)
AST SERPL-CCNC: 16 U/L — SIGNIFICANT CHANGE UP (ref 4–40)
BASE EXCESS BLDV CALC-SCNC: 5.6 MMOL/L — HIGH (ref -2–3)
BASOPHILS # BLD AUTO: 0.02 K/UL — SIGNIFICANT CHANGE UP (ref 0–0.2)
BASOPHILS NFR BLD AUTO: 0.2 % — SIGNIFICANT CHANGE UP (ref 0–2)
BILIRUB SERPL-MCNC: 0.6 MG/DL — SIGNIFICANT CHANGE UP (ref 0.2–1.2)
BLOOD GAS VENOUS COMPREHENSIVE RESULT: SIGNIFICANT CHANGE UP
BUN SERPL-MCNC: 21 MG/DL — SIGNIFICANT CHANGE UP (ref 7–23)
CALCIUM SERPL-MCNC: 9.5 MG/DL — SIGNIFICANT CHANGE UP (ref 8.4–10.5)
CHLORIDE BLDV-SCNC: 101 MMOL/L — SIGNIFICANT CHANGE UP (ref 96–108)
CHLORIDE SERPL-SCNC: 98 MMOL/L — SIGNIFICANT CHANGE UP (ref 98–107)
CHOLEST SERPL-MCNC: 226 MG/DL — HIGH
CO2 BLDV-SCNC: 33.7 MMOL/L — HIGH (ref 22–26)
CO2 SERPL-SCNC: 27 MMOL/L — SIGNIFICANT CHANGE UP (ref 22–31)
COVID-19 NUCLEOCAPSID GAM AB INTERP: NEGATIVE — SIGNIFICANT CHANGE UP
COVID-19 NUCLEOCAPSID TOTAL GAM ANTIBODY RESULT: 0.07 INDEX — SIGNIFICANT CHANGE UP
COVID-19 SPIKE DOMAIN AB INTERP: POSITIVE
COVID-19 SPIKE DOMAIN ANTIBODY RESULT: >250 U/ML — HIGH
CREAT SERPL-MCNC: 0.81 MG/DL — SIGNIFICANT CHANGE UP (ref 0.5–1.3)
EOSINOPHIL # BLD AUTO: 0.01 K/UL — SIGNIFICANT CHANGE UP (ref 0–0.5)
EOSINOPHIL NFR BLD AUTO: 0.1 % — SIGNIFICANT CHANGE UP (ref 0–6)
ESTIMATED AVERAGE GLUCOSE: 111 — SIGNIFICANT CHANGE UP
FOLATE SERPL-MCNC: 16.5 NG/ML — SIGNIFICANT CHANGE UP (ref 3.1–17.5)
GAS PNL BLDV: 138 MMOL/L — SIGNIFICANT CHANGE UP (ref 136–145)
GAS PNL BLDV: SIGNIFICANT CHANGE UP
GLUCOSE BLDV-MCNC: 118 MG/DL — HIGH (ref 70–99)
GLUCOSE SERPL-MCNC: 111 MG/DL — HIGH (ref 70–99)
HCO3 BLDV-SCNC: 32 MMOL/L — HIGH (ref 22–29)
HCT VFR BLD CALC: 44.4 % — SIGNIFICANT CHANGE UP (ref 39–50)
HCT VFR BLDA CALC: 42 % — SIGNIFICANT CHANGE UP (ref 39–51)
HDLC SERPL-MCNC: 68 MG/DL — SIGNIFICANT CHANGE UP
HGB BLD CALC-MCNC: 14 G/DL — SIGNIFICANT CHANGE UP (ref 13–17)
HGB BLD-MCNC: 14.3 G/DL — SIGNIFICANT CHANGE UP (ref 13–17)
IANC: 8.91 K/UL — HIGH (ref 1.5–8.5)
IMM GRANULOCYTES NFR BLD AUTO: 0.4 % — SIGNIFICANT CHANGE UP (ref 0–1.5)
LACTATE BLDV-MCNC: 1.8 MMOL/L — SIGNIFICANT CHANGE UP (ref 0.5–2)
LIPID PNL WITH DIRECT LDL SERPL: 146 MG/DL — HIGH
LYMPHOCYTES # BLD AUTO: 0.82 K/UL — LOW (ref 1–3.3)
LYMPHOCYTES # BLD AUTO: 7.9 % — LOW (ref 13–44)
MAGNESIUM SERPL-MCNC: 2 MG/DL — SIGNIFICANT CHANGE UP (ref 1.6–2.6)
MCHC RBC-ENTMCNC: 30 PG — SIGNIFICANT CHANGE UP (ref 27–34)
MCHC RBC-ENTMCNC: 32.2 GM/DL — SIGNIFICANT CHANGE UP (ref 32–36)
MCV RBC AUTO: 93.1 FL — SIGNIFICANT CHANGE UP (ref 80–100)
MONOCYTES # BLD AUTO: 0.64 K/UL — SIGNIFICANT CHANGE UP (ref 0–0.9)
MONOCYTES NFR BLD AUTO: 6.1 % — SIGNIFICANT CHANGE UP (ref 2–14)
NEUTROPHILS # BLD AUTO: 8.91 K/UL — HIGH (ref 1.8–7.4)
NEUTROPHILS NFR BLD AUTO: 85.3 % — HIGH (ref 43–77)
NON HDL CHOLESTEROL: 158 MG/DL — HIGH
NRBC # BLD: 0 /100 WBCS — SIGNIFICANT CHANGE UP
NRBC # FLD: 0 K/UL — SIGNIFICANT CHANGE UP
PCO2 BLDV: 53 MMHG — SIGNIFICANT CHANGE UP (ref 42–55)
PH BLDV: 7.39 — SIGNIFICANT CHANGE UP (ref 7.32–7.43)
PHOSPHATE SERPL-MCNC: 4.5 MG/DL — SIGNIFICANT CHANGE UP (ref 2.5–4.5)
PLATELET # BLD AUTO: 194 K/UL — SIGNIFICANT CHANGE UP (ref 150–400)
PO2 BLDV: 116 MMHG — SIGNIFICANT CHANGE UP
POTASSIUM BLDV-SCNC: 3.9 MMOL/L — SIGNIFICANT CHANGE UP (ref 3.5–5.1)
POTASSIUM SERPL-MCNC: 4.2 MMOL/L — SIGNIFICANT CHANGE UP (ref 3.5–5.3)
POTASSIUM SERPL-SCNC: 4.2 MMOL/L — SIGNIFICANT CHANGE UP (ref 3.5–5.3)
PROT SERPL-MCNC: 7.7 G/DL — SIGNIFICANT CHANGE UP (ref 6–8.3)
RBC # BLD: 4.77 M/UL — SIGNIFICANT CHANGE UP (ref 4.2–5.8)
RBC # FLD: 12.3 % — SIGNIFICANT CHANGE UP (ref 10.3–14.5)
SAO2 % BLDV: 98.1 % — SIGNIFICANT CHANGE UP
SARS-COV-2 IGG+IGM SERPL QL IA: 0.07 INDEX — SIGNIFICANT CHANGE UP
SARS-COV-2 IGG+IGM SERPL QL IA: >250 U/ML — HIGH
SARS-COV-2 IGG+IGM SERPL QL IA: NEGATIVE — SIGNIFICANT CHANGE UP
SARS-COV-2 IGG+IGM SERPL QL IA: POSITIVE
SODIUM SERPL-SCNC: 141 MMOL/L — SIGNIFICANT CHANGE UP (ref 135–145)
TRIGL SERPL-MCNC: 62 MG/DL — SIGNIFICANT CHANGE UP
TSH SERPL-MCNC: 3.81 UIU/ML — SIGNIFICANT CHANGE UP (ref 0.27–4.2)
VIT B12 SERPL-MCNC: 558 PG/ML — SIGNIFICANT CHANGE UP (ref 200–900)
WBC # BLD: 10.44 K/UL — SIGNIFICANT CHANGE UP (ref 3.8–10.5)
WBC # FLD AUTO: 10.44 K/UL — SIGNIFICANT CHANGE UP (ref 3.8–10.5)

## 2021-11-08 PROCEDURE — 99233 SBSQ HOSP IP/OBS HIGH 50: CPT

## 2021-11-08 PROCEDURE — 99222 1ST HOSP IP/OBS MODERATE 55: CPT

## 2021-11-08 RX ORDER — ENOXAPARIN SODIUM 100 MG/ML
40 INJECTION SUBCUTANEOUS DAILY
Refills: 0 | Status: DISCONTINUED | OUTPATIENT
Start: 2021-11-08 | End: 2021-11-11

## 2021-11-08 RX ORDER — LEVALBUTEROL 1.25 MG/.5ML
0.63 SOLUTION, CONCENTRATE RESPIRATORY (INHALATION) EVERY 6 HOURS
Refills: 0 | Status: DISCONTINUED | OUTPATIENT
Start: 2021-11-08 | End: 2021-11-15

## 2021-11-08 RX ORDER — SODIUM CHLORIDE 9 MG/ML
500 INJECTION, SOLUTION INTRAVENOUS
Refills: 0 | Status: DISCONTINUED | OUTPATIENT
Start: 2021-11-08 | End: 2021-11-08

## 2021-11-08 RX ORDER — PANTOPRAZOLE SODIUM 20 MG/1
40 TABLET, DELAYED RELEASE ORAL DAILY
Refills: 0 | Status: DISCONTINUED | OUTPATIENT
Start: 2021-11-08 | End: 2021-11-09

## 2021-11-08 RX ORDER — SODIUM CHLORIDE 9 MG/ML
1000 INJECTION, SOLUTION INTRAVENOUS
Refills: 0 | Status: DISCONTINUED | OUTPATIENT
Start: 2021-11-08 | End: 2021-11-09

## 2021-11-08 RX ORDER — METOPROLOL TARTRATE 50 MG
5 TABLET ORAL EVERY 6 HOURS
Refills: 0 | Status: DISCONTINUED | OUTPATIENT
Start: 2021-11-08 | End: 2021-11-09

## 2021-11-08 RX ORDER — LEVETIRACETAM 250 MG/1
500 TABLET, FILM COATED ORAL EVERY 12 HOURS
Refills: 0 | Status: DISCONTINUED | OUTPATIENT
Start: 2021-11-08 | End: 2021-11-09

## 2021-11-08 RX ADMIN — ENOXAPARIN SODIUM 40 MILLIGRAM(S): 100 INJECTION SUBCUTANEOUS at 12:04

## 2021-11-08 RX ADMIN — Medication 40 MILLIGRAM(S): at 06:04

## 2021-11-08 RX ADMIN — SODIUM CHLORIDE 50 MILLILITER(S): 9 INJECTION, SOLUTION INTRAVENOUS at 23:20

## 2021-11-08 RX ADMIN — LEVETIRACETAM 400 MILLIGRAM(S): 250 TABLET, FILM COATED ORAL at 23:20

## 2021-11-08 RX ADMIN — PANTOPRAZOLE SODIUM 40 MILLIGRAM(S): 20 TABLET, DELAYED RELEASE ORAL at 12:04

## 2021-11-08 RX ADMIN — LEVETIRACETAM 400 MILLIGRAM(S): 250 TABLET, FILM COATED ORAL at 11:42

## 2021-11-08 RX ADMIN — Medication 5 MILLIGRAM(S): at 10:19

## 2021-11-08 RX ADMIN — Medication 0.5 MILLIGRAM(S): at 03:39

## 2021-11-08 NOTE — PROGRESS NOTE ADULT - PROBLEM SELECTOR PLAN 3
Pt on Atenolol 25 mg q12hrs outpatient. BPs in acceptable range overnight. HR improved.  -Hold Atenolol for now while strict NPO  -Will do IV Metoprolol 5 mg q6hrs PRN for HR > 110, hold SBP < 110  -Vitals q4hrs

## 2021-11-08 NOTE — H&P ADULT - PROBLEM SELECTOR PLAN 4
-cw Tamsulosin 0.4mg daily  -monitor for retention No S/S of urinary retention.  -Hold Flomax for now while strict NPO, resume if able to take PO meds  -Monitor urine output and renal function

## 2021-11-08 NOTE — H&P ADULT - PROBLEM SELECTOR PLAN 7
-DVT Proph: Lovenox   -Diet: NPO until S&S and cineesophogram  -was on reg diet w/ thin liquids at [facility]  - consult -patient left against medical advice from NH  -Family contacted for collateral x3  -med rec pharmacy emailed Pt on Keppra 500 mg bid at NH. Possibly in setting of seizures, though indication unclear.  - C/w IV Keppra 500 mg bid  - Aspiration precautions, seizure precautions

## 2021-11-08 NOTE — PROGRESS NOTE ADULT - PROBLEM SELECTOR PLAN 1
-Patient presenting to ED w/ O2 sat of 65% on room air and pCO2 60 on VBG. Possibly in setting of COPD exacerbation and/or ILD flare in setting of not having adequate oxygenation. Since BIPAP - gas has improved. Stable on NC currently.  -CTA chest with IV contrast negative for PE, likely ILD/fibrosis   -Seen by MICU - not a candidate  -C/w BiPAP, wean as tolerated  -Continuous pulse ox monitoring  -Keep strict NPO

## 2021-11-08 NOTE — CONSULT NOTE ADULT - SUBJECTIVE AND OBJECTIVE BOX
CHIEF COMPLAINT:  hypoxic respiratory failure     HPI:    Zain Bryant is a 74yo male w/ PMH of COPD, ?pulm fibrosis, HTN, Depression, BPH, GERD, ?seizures (on Keppra 500mg BID)  who presented to [facility] 11/8/21 with shortness of breath.  CT imaging on exam was concerning for NSIP pattern for which pulmonology has been consulted.    On exam patient states that he was recently started on oxygen after a hospitalization for a similar reason (he was also short of breath at this time).  He stated that prior to this he had never been on oxygen.  He denies any personal or family history of lung disease.  He says he used to smoke, but quit about 35 years ago.  He denies any recent sick contacts.  He denies any significant fevers, cough, chills.  He was unsure of his previous jobs, but denies any environmental exposure to smoke or fumes.  He states that since being hospitalized he feels as if his breathing has improved.  He denies any significant rashes, or joint pains.       PAST MEDICAL & SURGICAL HISTORY:  Hypertension    Hypothyroid    BPH (benign prostatic hypertrophy)    COPD with hypoxia    HTN (hypertension)    Benign prostate hyperplasia    GERD (gastroesophageal reflux disease)    Depression, major    No significant past surgical history    No significant past surgical history        FAMILY HISTORY:  No pertinent family history in first degree relatives        SOCIAL HISTORY:  Smoking: [ ] Never Smoked [x ] Former Smoker    Allergies    No Known Allergies    Intolerances      HOME MEDICATIONS:  Home Medications:  alfuzosin extended release 10 mg oral tablet, extended release: 1 tab(s) orally once a day (06 Aug 2015 06:21)  atenolol 25 mg oral tablet: 1 tab(s) orally 2 times a day (Filled via [facility] on 9/11/21 x 1 month) (08 Nov 2021 11:34)  atenolol 50 mg oral tablet: 1 tab(s) orally once a day (06 Aug 2015 06:21)  doxazosin 8 mg oral tablet: 1 tab(s) orally once (at bedtime) (06 Aug 2015 06:21)  escitalopram 10 mg oral tablet: 1 tab(s) orally once a day (Filled via [facility] on 10/7/21 x 1 month) (08 Nov 2021 11:34)  famotidine 20 mg oral tablet: 1 tab(s) orally 2 times a day (Filled via [facility] on 10/7/21 x 1 month) (08 Nov 2021 11:34)  Keppra 500 mg oral tablet: 1 tab(s) orally 2 times a day (Filled via [facility] on 10/7/21 x 1 month) (08 Nov 2021 11:34)  levothyroxine 112 mcg (0.112 mg) oral tablet: 1 tab(s) orally once a day (Filled via [facility] in May/2021 x 3 months) (08 Nov 2021 11:34)  levothyroxine 75 mcg (0.075 mg) oral tablet: 1 tab(s) orally once a day (06 Aug 2015 06:21)  Metoprolol Succinate ER 50 mg oral tablet, extended release: 1 tab(s) orally once a day (06 Aug 2015 06:21)  montelukast 10 mg oral tablet: 1 tab(s) orally once a day (Filled via [facility] on 10/7/21 x 1 month) (08 Nov 2021 11:34)  tamsulosin 0.4 mg oral capsule: 1 cap(s) orally once a day (Filled via [facility] on 8/30/21 x 3 months) (08 Nov 2021 11:34)  Trelegy Ellipta 200 mcg-62.5 mcg-25 mcg/inh inhalation powder: 1 puff(s) inhaled once a day (08 Nov 2021 11:34)  Ventolin HFA 90 mcg/inh inhalation aerosol: 2 puff(s) inhaled every 6 hours, As Needed (Filled via [facility] on 10/7/21 x 1 month) (08 Nov 2021 11:34)      REVIEW OF SYSTEMS:  Constitutional: [ ] negative [ ] fevers [ ] chills [ ] weight loss [ ] weight gain  HEENT: [ ] negative [ ] dry eyes [ ] eye irritation [ ] postnasal drip [ ] nasal congestion  CV: [ ] negative  [ ] chest pain [ ] orthopnea [ ] palpitations [ ] murmur  Resp: [ ] negative [ ] cough [x ] shortness of breath [ ] dyspnea [ ] wheezing [ ] sputum [ ] hemoptysis  GI: [ ] negative [ ] nausea [ ] vomiting [ ] diarrhea [ ] constipation [ ] abd pain [ ] dysphagia   : [ ] negative [ ] dysuria [ ] nocturia [ ] hematuria [ ] increased urinary frequency  Musculoskeletal: [ ] negative [ ] back pain [ ] myalgias [ ] arthralgias [ ] fracture  Skin: [ ] negative [ ] rash [ ] itch  Neurological: [ ] negative [ ] headache [ ] dizziness [ ] syncope [ ] weakness [ ] numbness  Psychiatric: [ ] negative [ ] anxiety [ ] depression  Endocrine: [ ] negative [ ] diabetes [ ] thyroid problem  Hematologic/Lymphatic: [ ] negative [ ] anemia [ ] bleeding problem  Allergic/Immunologic: [ ] negative [ ] itchy eyes [ ] nasal discharge [ ] hives [ ] angioedema  [ ] All other systems negative  [ ] Unable to assess ROS because ________    OBJECTIVE:  ICU Vital Signs Last 24 Hrs  T(C): 36.9 (08 Nov 2021 17:27), Max: 36.9 (07 Nov 2021 19:00)  T(F): 98.5 (08 Nov 2021 17:27), Max: 98.5 (08 Nov 2021 17:27)  HR: 106 (08 Nov 2021 17:27) (84 - 120)  BP: 118/85 (08 Nov 2021 17:27) (109/87 - 143/72)  BP(mean): --  ABP: --  ABP(mean): --  RR: 17 (08 Nov 2021 17:27) (17 - 60)  SpO2: 100% (08 Nov 2021 17:27) (97% - 100%)        11-08 @ 07:01  -  11-08 @ 17:54  --------------------------------------------------------  IN: 0 mL / OUT: 450 mL / NET: -450 mL      CAPILLARY BLOOD GLUCOSE          PHYSICAL EXAM:  General:  no acute distress, thin,   HEENT: atraumatic, normocephalic, poor dentition  Respiratory:  mild crackles bilaterally but otherwise no significant wheezing, no use of accessory muscles of respiration   Cardiovascular:  RRR, no rubs, murmurs, gallops   Abdomen: thin, non-distended, non-tedner   Extremities: thin, no edema   Skin: no rashes, cyanosis  Neurological: no gross/focal deficits appreciated       LINES:     HOSPITAL MEDICATIONS:  Standing Meds:  enoxaparin Injectable 40 milliGRAM(s) SubCutaneous daily  lactated ringers. 500 milliLiter(s) IV Continuous <Continuous>  levETIRAcetam  IVPB 500 milliGRAM(s) IV Intermittent every 12 hours  methylPREDNISolone sodium succinate Injectable 40 milliGRAM(s) IV Push daily  pantoprazole  Injectable 40 milliGRAM(s) IV Push daily      PRN Meds:  levalbuterol Inhalation 0.63 milliGRAM(s) Inhalation every 6 hours PRN  metoprolol tartrate Injectable 5 milliGRAM(s) IV Push every 6 hours PRN      LABS:                        14.3   10.44 )-----------( 194      ( 08 Nov 2021 06:34 )             44.4     Hgb Trend: 14.3<--, 14.1<--  11-08    141  |  98  |  21  ----------------------------<  111<H>  4.2   |  27  |  0.81    Ca    9.5      08 Nov 2021 06:34  Phos  4.5     11-08  Mg     2.00     11-08    TPro  7.7  /  Alb  3.9  /  TBili  0.6  /  DBili  x   /  AST  16  /  ALT  11  /  AlkPhos  77  11-08    Creatinine Trend: 0.81<--, 0.78<--      Arterial Blood Gas:  11-07 @ 14:41  7.45/45/96/31/98.1/6.4  ABG lactate: --    Venous Blood Gas:  11-08 @ 06:34  7.39/53/116/32/98.1  VBG Lactate: 1.8  Venous Blood Gas:  11-07 @ 21:24  7.38/53/43/31/75.1  VBG Lactate: 3.0  Venous Blood Gas:  11-07 @ 13:24  7.38/60/23/36/36.2  VBG Lactate: 1.8      MICROBIOLOGY:       RADIOLOGY:  [ ] Reviewed and interpreted by me    PULMONARY FUNCTION TESTS:    EKG:

## 2021-11-08 NOTE — H&P ADULT - ATTENDING COMMENTS
74yo male w/ PMH of COPD (on 4L NC home O2), ?pulm fibrosis, HTN, Depression, BPH, GERD, ?seizures (on Keppra 500mg BID) presents with SOB, admitted with acute on chronic hypoxic and hypercapnic respiratory failure possibly 2/2 COPD exacerbation +/- ILD flare in setting of inadequate oxygenation. On BiPAP. Started on steroids with IV Solumedrol 40 mg daily and Xopenex. Will obtain Pulm consult. MICU rejected.

## 2021-11-08 NOTE — ED ADULT NURSE REASSESSMENT NOTE - NS ED NURSE REASSESS COMMENT FT1
Break covering RN: Pt continuing to pull off biPAP mask. Pt tachypneic sating 100% on bipap mask. VS as noted, will continue to monitor.

## 2021-11-08 NOTE — H&P ADULT - NEGATIVE GASTROINTESTINAL SYMPTOMS
no vomiting/no diarrhea/no constipation/no abdominal pain no nausea/no vomiting/no diarrhea/no constipation/no abdominal pain/no melena/no hematochezia

## 2021-11-08 NOTE — H&P ADULT - ASSESSMENT
74yo male w/ PMH of COPD, HTN, Depression, BPH, GERD, ?seizures (on Keppra 500mg BID)  presents with SOB. Patient left against medical advice from [facility] 2 days ago. On NC 4L at baseline, presented to ED satting 65% on RA after running out of O2. Now satting 98% on BiPAP 12/6, remains tachypneic and tachycardic.  74yo male w/ PMH of COPD, ?pulm fibrosis, HTN, Depression, BPH, GERD, ?seizures (on Keppra 500mg BID)  presents with SOB. Patient left against medical advice from [facility] 2 days ago. On NC 4L at baseline, presented to ED satting 65% on RA after running out of O2. Now satting 98% on BiPAP 12/6, remains tachypneic and tachycardic.  74yo male w/ PMH of COPD (on 4L NC home O2), ?pulm fibrosis, HTN, Depression, BPH, GERD, ?seizures (on Keppra 500mg BID) presents with SOB. Patient left against medical advice from [facility] 2 days ago. On NC 4L at baseline, presented to ED satting 65% on RA after running out of O2. Now satting 98% on BiPAP 12/6, remains tachypneic and tachycardic.

## 2021-11-08 NOTE — H&P ADULT - NEGATIVE ENMT SYMPTOMS
no ear pain/no nasal congestion no ear pain/no tinnitus/no vertigo/no nasal congestion/no nasal discharge/no throat pain/no dysphagia

## 2021-11-08 NOTE — CONSULT NOTE ADULT - ATTENDING COMMENTS
Patient with CT chest s/o NSIP. pt is poor historian, but is on home O2 at baseline. Primary team to reach out to family for more information, and regarding last hospitalization records but this is likely chronic underlying lung disease, unclear how much has been worked up. Agree with above plan. Start solumedrol 50 mg daily, may need prolonged course with taper/PCP ppx/ please order serologies as above to rule out CTD related ILD, would also benefit from TTE evaluation if not done recently. will follow.

## 2021-11-08 NOTE — PHARMACOTHERAPY INTERVENTION NOTE - COMMENTS
Medication history is currently saved as incomplete. Unable to verify patient's medication list entirely. Patient recently left [facility] AMA, left message with Nurse Supervisor (Pamela) to call us back with medication history/information.   OMR is updated with recently filled medications provided by [facility] (Filled within last 3 months).   OMR to be updated accordingly when NH calls back with additional information.

## 2021-11-08 NOTE — CONSULT NOTE ADULT - ASSESSMENT
Zain Bryant is a 74yo male w/ PMH of COPD, ?pulm fibrosis, HTN, Depression, BPH, GERD, ?seizures (on Keppra 500mg BID)  who presented to [facility] 21 with shortness of breath.  CT imaging on exam was concerning for NSIP pattern for which pulmonology has been consulted.    #Interstitial lung disease  - patient w/imaging on exam concerning for NSIP, although reports no prior work-up or evaluation prior to this or a recent hospitalization where he was initiated on home O2 therapy  - infectious work-up thus far negative,  - patient initiated on solumedrol 40 mg daily with reported improvement in symptoms as above     Recommendations  - given concern for ILD exacerbation, can increased solumedrol dose to 50 mg daily (1 mg/kg) pending further improvement in symptoms  - would maintain net even-net negative fluid balance  - please provide and encourage use of incentive spirometry  - agree w/echocardiogram  - patient will need outpatient follow up for further evaluation of etiology of likely ILD, while inpatient please check the following labs/serologies:  -Rheumatoid factor  -Anti-CCP  -Anti-centromere Ab  -CK, aldolase  -Myomarker  -ANCA  -Scl-70 Ab  -HAFSA  -DsDNA  -anti-Ro, anti-La  -RNP (extractable nuclear antigens)  -Lisa-1  -ESR  - CRP  - patient will need outpatient follow up with formal PFTs and repeat imaging, please email [email or web address] on discharge to set up appointment, please include patient's name, , MRN, and contact information  - wean FiO2 as tolerated, but patient reportedly already on outpatient home O2 therapy and so will need this arranged as well prior to discharge     Pulmonary to continue to follow    Carl West PGY4  [id number] Zain Bryant is a 74yo male w/ PMH of COPD, ?pulm fibrosis, HTN, Depression, BPH, GERD, ?seizures (on Keppra 500mg BID)  who presented to [facility] 21 with shortness of breath.  CT imaging on exam was concerning for NSIP pattern for which pulmonology has been consulted.    #Interstitial lung disease  - patient w/imaging on exam concerning for NSIP, although reports no prior work-up or evaluation prior to this or a recent hospitalization where he was initiated on home O2 therapy  - infectious work-up thus far negative,  - patient initiated on solumedrol 40 mg daily with reported improvement in symptoms as above     Recommendations  - given concern for ILD exacerbation, can increased solumedrol dose to 50 mg daily (1 mg/kg) pending further improvement in symptoms  - patient also reportedly on steroids/taper at OSH--please determine duration/dose of steroids at OSH as patient may need PJP prophylaxis   - would maintain net even-net negative fluid balance  - please provide and encourage use of incentive spirometry  - agree w/echocardiogram  - patient will need outpatient follow up for further evaluation of etiology of likely ILD, while inpatient please check the following labs/serologies:  -Rheumatoid factor  -Anti-CCP  -Anti-centromere Ab  -CK, aldolase  -Myomarker  -ANCA  -Scl-70 Ab  -HAFSA  -DsDNA  -anti-Ro, anti-La  -RNP (extractable nuclear antigens)  -Lisa-1  -ESR  - CRP  - patient will need outpatient follow up with formal PFTs and repeat imaging, please email [email or web address] on discharge to set up appointment, please include patient's name, , MRN, and contact information  - wean FiO2 as tolerated, but patient reportedly already on outpatient home O2 therapy and so will need this arranged as well prior to discharge     Pulmonary to continue to follow    Carl West PGY4  [id number]

## 2021-11-08 NOTE — H&P ADULT - PROBLEM SELECTOR PLAN 5
-CT showing mild esophageal dilation  -Patient on regular diet w/ thin liquids at nursing home  -NPO until S&S, cineesophagogram   -cw Famotidine 20mg PRN  -Protonix 40mg daily -CTA chest showing mild esophageal dilation  -Patient on regular diet w/ thin liquids at nursing home  -Strict NPO until S&S eval and while on BiPAP  -Start IV Protonix 40 mg daily

## 2021-11-08 NOTE — H&P ADULT - NEGATIVE NEUROLOGICAL SYMPTOMS
no headache no paresthesias/no syncope/no loss of sensation/no headache/no loss of consciousness/no hemiparesis

## 2021-11-08 NOTE — H&P ADULT - PROBLEM SELECTOR PLAN 2
-previously on prednisone gay at NH  -start Solumedrol ----   -cw Montelukast 10mg daily  -cw albuteral PRN  -CT showing likely ILD -pulm consult in am -previously on prednisone gay at NH  -start Solumedrol ----   -cw Montelukast 10mg daily  -cw duonebs PRN  -Xopenex PRN   -CT showing likely ILD -pulm consult in am -previously on prednisone gay at NH  -start Solumedrol 40mg IV   -cw Montelukast 10mg daily  -cw duonebs PRN  -Xopenex PRN   -CT showing likely ILD -pulm consult in am On 4L [place] home O2. Now possibly with exacerbation in setting of not having adequate oxygenation.  -Previously on Prednisone taper at NH  -Start IV Solumedrol 40 mg daily, also given pulm fibrosis seen on CTA chest and wheezing on exam  -C/w Xopenex PRN   -Resume Singulair 10 mg daily when pt able to take PO meds  -CT showing likely ILD - Pulm consult to be called in AM

## 2021-11-08 NOTE — H&P ADULT - NSHPSOCIALHISTORY_GEN_ALL_CORE
74 yo male left [facility]. Previously lived w/ daughter in April 2021. Per documentation, patient smoked 30 years ago. 74 yo male [facility]. Previously lived w/ daughter in April 2021. Per documentation, patient smoked 30 years ago. Patient denies history of alcohol or illicit drug use.

## 2021-11-08 NOTE — H&P ADULT - NSICDXPASTMEDICALHX_GEN_ALL_CORE_FT
PAST MEDICAL HISTORY:  Benign prostate hyperplasia     COPD with hypoxia     Depression, major     GERD (gastroesophageal reflux disease)     HTN (hypertension)

## 2021-11-08 NOTE — SWALLOW BEDSIDE ASSESSMENT ADULT - SLP PERTINENT HISTORY OF CURRENT PROBLEM
"74yo male w/ PMH of COPD, ?pulm fibrosis, HTN, Depression, BPH, GERD, ?seizures (on Keppra 500mg BID)  presents with SOB.  #[id number] used. Patient on BiPAP satting 98%, interaction limited 2/2 tachypnea. Patient gave writer permission to speak w/ family. Patient endorses SOB and confirms that he uses O2 at baseline but does not know amount. Denies chest pain, HA, cough, fever, abdominal pain, N/V, diarrhea, constipation, urinary burning.

## 2021-11-08 NOTE — H&P ADULT - PROBLEM SELECTOR PLAN 3
-cw Atenolol 25mg q12  -patient w/ sinus tachycardia to 110s   -vitals q4 -cw Atenolol 25mg q12 -held while NPO  -patient w/ sinus tachycardia to 110s   -Metoprolol 5mg IV q6 for HR > 110, hold SBP < 110  -vitals q4 Pt on Atenolol 25 mg q12hrs outpatient. BPs in acceptable range overnight.   -Hold Atenolol for now while strict NPO  -Patient w/ sinus tachycardia to 110s   -Will do IV Metoprolol 5 mg q6hrs PRN for HR > 110, hold SBP < 110  -Vitals q4hrs

## 2021-11-08 NOTE — H&P ADULT - NSHPLABSRESULTS_GEN_ALL_CORE
Labs:                        14.1   8.85  )-----------( 198      ( 07 Nov 2021 13:24 )             42.6     11-07    142  |  102  |  18  ----------------------------<  125<H>  4.2   |  29  |  0.78    Ca    9.2      07 Nov 2021 13:24  Phos  3.2     11-07  Mg     1.90     11-07    TPro  7.1  /  Alb  3.8  /  TBili  0.6  /  DBili  x   /  AST  18  /  ALT  12  /  AlkPhos  71  11-07      CARDIAC ENZYMES:  Troponin T, High Sensitivity: 14 ng/L (11-07-21 @ 15:55)  Troponin T, High Sensitivity: 15 ng/L (11-07-21 @ 13:24)    Serum Pro-Brain Natriuretic Peptide : 99 pg/mL (11-07-21 @ 13:31)       Blood Gas Venous:  pH: 7.38 | HCO3: 31 | pCO2: 53 | pO2: 43 | Lactate: 3.0 (11-07-21 @ 21:24)  pH: 7.38 | HCO3: 36 | pCO2: 60 | pO2: 23 | Lactate: 1.8 (11-07-21 @ 13:24)      Blood Gas Arterial:  pH: 7.45 | HCO3: 31 | pCO2: 45 | pO2: 96 | Lactate: x (11-07-21 @ 14:41)      COVID-19/Full RVP Panel:    11-07-21 @ 13:56  Adenovirus: NotDetec  CHlamydia pneumoniae: NotDetec  Coronavirus (229E, KHU1, NL63, OC43): --  Entero/Rhinovirus: NotDetec  hMPV: NotDetec  Influenza A: NotDetec  Influenza AH1: --  Influenza AH1 2009: --  Influenza AH3: --  Influenza B: NotDetec  Mycoplasma pneumoniae: NotDetec  Parainfluenza 1: NotDetec  Parainfluenza 2: NotDetec  Parainfluenza 3: NotDetec  Parainfluenza 4: NotDetec  Rapid RVP Results: NotDetec  Resp Syncytial Virus: NotDetec  SARS-CoV-2: NotDetec    < from: CT Angio Chest PE Protocol w/ IV Cont (11.07.21 @ 16:52) >    IMPRESSION:  No main, right, left, lobar or proximal segmental pulmonary embolism. Limited evaluation of the distal segmental and subsegmental branches due to obscuration by respiratory motion.    Fibrosing interstitial lung disease, most consistent with NSIP. The predilection for the anterior upper lobes raises the possibility of connective tissue disease associated ILD.    Indeterminate nodular thickening of the major fissures, possibly related to fibrosis. 3 month follow-up is recommended to assess for stability.    Mild esophageal dilatation.    < end of copied text >    < from: Xray Chest 1 View- PORTABLE-Urgent (11.07.21 @ 15:21) >    IMPRESSION: Diffuse interstitial lung disease.    < end of copied text >    EKG- 99bpm, NS Labs personally reviewed.                        14.1   8.85  )-----------( 198      ( 07 Nov 2021 13:24 )             42.6     11-07    142  |  102  |  18  ----------------------------<  125<H>  4.2   |  29  |  0.78    Ca    9.2      07 Nov 2021 13:24  Phos  3.2     11-07  Mg     1.90     11-07    TPro  7.1  /  Alb  3.8  /  TBili  0.6  /  DBili  x   /  AST  18  /  ALT  12  /  AlkPhos  71  11-07    CARDIAC ENZYMES:  Troponin T, High Sensitivity: 14 ng/L (11-07-21 @ 15:55)  Troponin T, High Sensitivity: 15 ng/L (11-07-21 @ 13:24)    Serum Pro-Brain Natriuretic Peptide : 99 pg/mL (11-07-21 @ 13:31)     Blood Gas Venous:  pH: 7.38 | HCO3: 31 | pCO2: 53 | pO2: 43 | Lactate: 3.0 (11-07-21 @ 21:24)  pH: 7.38 | HCO3: 36 | pCO2: 60 | pO2: 23 | Lactate: 1.8 (11-07-21 @ 13:24)    Blood Gas Arterial:  pH: 7.45 | HCO3: 31 | pCO2: 45 | pO2: 96 | Lactate: x (11-07-21 @ 14:41)    COVID-19/Full RVP Panel:    11-07-21 @ 13:56  Adenovirus: NotDetec  CHlamydia pneumoniae: NotDetec  Coronavirus (229E, KHU1, NL63, OC43): --  Entero/Rhinovirus: NotDetec  hMPV: NotDetec  Influenza A: NotDetec  Influenza AH1: --  Influenza AH1 2009: --  Influenza AH3: --  Influenza B: NotDetec  Mycoplasma pneumoniae: NotDetec  Parainfluenza 1: NotDetec  Parainfluenza 2: NotDetec  Parainfluenza 3: NotDetec  Parainfluenza 4: NotDetec  Rapid RVP Results: NotDetec  Resp Syncytial Virus: NotDetec  SARS-CoV-2: NotDetec    Imaging personally reviewed.  CT Angio Chest PE Protocol w/ IV Cont (11.07.21 @ 16:52)     IMPRESSION:  No main, right, left, lobar or proximal segmental pulmonary embolism. Limited evaluation of the distal segmental and subsegmental branches due to obscuration by respiratory motion.    Fibrosing interstitial lung disease, most consistent with NSIP. The predilection for the anterior upper lobes raises the possibility of connective tissue disease associated ILD.    Indeterminate nodular thickening of the major fissures, possibly related to fibrosis. 3 month follow-up is recommended to assess for stability.    Mild esophageal dilatation.    Xray Chest 1 View- PORTABLE-Urgent (11.07.21 @ 15:21)    IMPRESSION: Diffuse interstitial lung disease.    EKG personally reviewed.  Sinus tach at 107 bpm, L atrial enlargement, LVH, QTc 448 ms, no acute ischemic changes

## 2021-11-08 NOTE — H&P ADULT - NEGATIVE OPHTHALMOLOGIC SYMPTOMS
no diplopia/no pain L/no pain R no diplopia/no photophobia/no blurred vision L/no blurred vision R/no pain L/no pain R

## 2021-11-08 NOTE — H&P ADULT - PROBLEM SELECTOR PLAN 1
-Patient presenting to ED w/ O2 sat of 65% on room air, tachypneic and sinus tachycardic   -was previously on NC 4L at baseline, family "ran out" of oxygen   -Now on BiPAP 12/6 40% satting 98% w/ tachynea to 40s  -CT- neg for PE, likely ILD/fibrosis   -Seen by MICU- not a candidate  - hypercapnia likely chronic, improving on BiPAP  -cw BiPAP, wean as tolerated  -fu VBG in am  -continuos pulse ox monitoring -Patient presenting to ED w/ O2 sat of 65% on room air and pCO2 60 on VBG. Possibly in setting of COPD exacerbation and/or ILD flare in setting of not having adequate oxygenation.  -Was previously on 4L NC at baseline, family states pt "ran out" of supplemental O2  -Now on BiPAP 12/6 FiO2, satting 98% w/ tachypnea to 40s  -CTA chest with IV contrast negative for PE, likely ILD/fibrosis   -Seen by MICU - not a candidate  -C/w BiPAP, wean as tolerated  -F/u VBG in AM  -Continuous pulse ox monitoring  -Keep strict NPO while on NPO  -Plan as below for COPD

## 2021-11-08 NOTE — H&P ADULT - HISTORY OF PRESENT ILLNESS
74yo male w/ PMH of COPD, HTN, Depression, BPH, GERD< ?seizures (on Keppra 500mg BID)  presents with SOB.  #[id number] used. Patient on BiPAP satting 98%, interaction limited 2/2 tachypnea. Patient gave writer permission to speak w/ family. Patient endorses SOB and confirms that he uses O2 at baseline but does not know amount. Denies chest pain, HA, cough, fever, abdominal pain, N/V, diarrhea, constipation, urinary burning.   Daughter Santa ([phone]) does not know father's current medical history and has not seen him since April, states he has lung problems and has "been deteriorating". Left voicemail for Niece- Safia Bryant ([phone]) and Daughter Soumya ([phone]) who daughter says knows father's medical history and assists with appointments. Called [facility] for information- overnight charge SHAN Pearson can only off that patient left against medical advice from [facility] on 11/6 ahd HCP/Niece gave verbal consent. [facility] documentation and ED documentations used to obtain collateral.  Patient on NC 4L at baseline, after leaving NH family "ran out" of O@ and patient became SOB and they brought him to ED.     ED course: presented satting 65% on RA and tachynpeac. Seen by MICU and escalated to BiPAP 12/6 40%, now satting 98% but remains tachypneic and sinus tachycardic. patient received Haldol in ED for removing BiPAP mask  74yo male w/ PMH of COPD, ?pulm fibrosis, HTN, Depression, BPH, GERD, ?seizures (on Keppra 500mg BID)  presents with SOB.  #[id number] used. Patient on BiPAP satting 98%, interaction limited 2/2 tachypnea. Patient gave writer permission to speak w/ family. Patient endorses SOB and confirms that he uses O2 at baseline but does not know amount. Denies chest pain, HA, cough, fever, abdominal pain, N/V, diarrhea, constipation, urinary burning.   Daughter Santa ([phone]) does not know father's current medical history and has not seen him since April, states he has lung problems and has "been deteriorating". Left voicemail for Niece- Safia Bryant ([phone]) and Daughter Soumya ([phone]) who daughter says knows father's medical history and assists with appointments. Called [facility] for information- overnight charge SHAN Pearson can only off that patient left against medical advice from [facility] on 11/6 ahd HCP/Niece gave verbal consent. [facility] home documentation and ED documentations used to obtain collateral.  Patient on NC 4L at baseline, after leaving NH family "ran out" of O2 and patient became SOB and they brought him to ED.     ED course: presented satting 65% on RA and tachynpeac. Seen by MICU and escalated to BiPAP 12/6 40%, now satting 98% but remains tachypneic and sinus tachycardic. patient received Haldol in ED for removing BiPAP mask

## 2021-11-08 NOTE — H&P ADULT - MUSCULOSKELETAL
details… detailed exam ROM intact/no calf tenderness Muscle wasting/ROM intact/no joint swelling/no calf tenderness/normal strength

## 2021-11-08 NOTE — CHART NOTE - NSCHARTNOTEFT_GEN_A_CORE
Notified by RN that patient remains tachypneic to 40s. Patient given Ativan 0.5mg IV x1 w/ improvement. Patient rounded on by writer -sleeping, RR improved to 26. HR still elevated to 118. Case discussed w/ attending Dr Nava -will start Metoprolol 5mg IV q6 PRN for HR > 110 as home Atenolol held 2/2 NPO.

## 2021-11-08 NOTE — H&P ADULT - PROBLEM SELECTOR PLAN 8
-DVT Proph: Lovenox   -Diet: NPO until S&S eval and while on BiPAP  -was on reg diet w/ thin liquids at [facility]  - consult -patient left against medical advice from NH  -Family contacted for collateral x3  -med rec pharmacy emailed

## 2021-11-08 NOTE — PROGRESS NOTE ADULT - PROBLEM SELECTOR PLAN 2
On 4L [place] home O2. Now possibly with exacerbation in setting of not having adequate oxygenation.  -Previously on Prednisone taper at NH  -Start IV Solumedrol 40 mg daily, also given pulm fibrosis seen on CTA chest and wheezing on exam  -C/w Xopenex PRN   -Resume Singulair 10 mg daily when pt able to take PO meds  -CT showing likely ILD - reviewed [employer] - -F/u with Dr Nesbitt @ [place]. Will f/u pulm for further recs.

## 2021-11-08 NOTE — H&P ADULT - NSHPPHYSICALEXAM_GEN_ALL_CORE
Vital Signs Last 24 Hrs  T(C): 36.7 (08 Nov 2021 06:35), Max: 36.9 (07 Nov 2021 19:00)  T(F): 98 (08 Nov 2021 06:35), Max: 98.4 (07 Nov 2021 19:00)  HR: 104 (08 Nov 2021 06:35) (84 - 120)  BP: 115/82 (08 Nov 2021 06:35) (109/87 - 143/72)  BP(mean): --  RR: 26 (08 Nov 2021 06:35) (22 - 60)  SpO2: 100% (08 Nov 2021 06:35) (96% - 100%)

## 2021-11-08 NOTE — PROGRESS NOTE ADULT - PROBLEM SELECTOR PLAN 8
-DVT Proph: Lovenox   -Diet: NPO for now, pending SLP re-eval.  -was on reg diet w/ thin liquids at [facility]  -SW consult -patient left against medical advice from NH  -Spoke w/ kavon Dooley- reviewed recent events. States pt is from NH in [facility]. He had called family stating he doesn't want to be there anymore. So family took him out of the facility. Since then, as noted in history - he has run out of O2 and now admitted for acute respiratory failure. Family's plan is to ultimately send him back to NH as they cannot care for him. Will need to discuss w/ niece, Safia (who seems to be the main person coordinating his care) for collateral.  -med rec pharmacy emailed - pending additinoal collateral from NH to finalize med rec.

## 2021-11-08 NOTE — H&P ADULT - NEGATIVE PSYCHIATRIC SYMPTOMS
no suicidal ideation/no depression/no anxiety no suicidal ideation/no depression/no anxiety/no agitation

## 2021-11-08 NOTE — PROGRESS NOTE ADULT - SUBJECTIVE AND OBJECTIVE BOX
[facility] Division of Hospital Medicine  Valentin LopezCarson) MD Marco A  Pager [phone]    SUBJECTIVE:  Chief complaint: SOB.     ID:[id number]  Pt seen and evaluated at bedside this AM. No o/n events. States breathing has improved since coming in. No complaints of cough, sore throat, phlegm.      ROS: All systems negative except as noted.      Vital Signs Last 24 Hrs  T(C): 36.4 (08 Nov 2021 13:30), Max: 36.9 (07 Nov 2021 19:00)  T(F): 97.6 (08 Nov 2021 13:30), Max: 98.4 (07 Nov 2021 19:00)  HR: 97 (08 Nov 2021 14:44) (84 - 120)  BP: 119/77 (08 Nov 2021 13:30) (109/87 - 143/72)  BP(mean): --  RR: 24 (08 Nov 2021 13:30) (20 - 60)  SpO2: 98% (08 Nov 2021 14:44) (97% - 100%)      PHYSICAL EXAM:  Gen- In bed, comfortable, NAD  Eyes- EOMI, PERRLA, nonicteric.  EMNT- Fair dentition. MMM. No tonsilar exudates. No posterior pharynx erythema.  Neck- Supple. No masses. No tracheal deviation.  Resp- Bilateral fine crackles, Good effort. No r/r/w. No accessory muscle use.  CVS- RRR, S1S2, no g/r/m. No LE edema.  GI- Soft abd, NT, ND, +BSx4. No HSM.  MSK- No C/C. ROM intact. No crepitus.  Neuro- CN II-XII intact. Speech fluent/face symmetric. Sensation intact.  Skin- No rashes/ulcers. Warm/moist.  Psych- Appropriate mood/affect.      MEDICATION:  MEDICATIONS  (STANDING):  enoxaparin Injectable 40 milliGRAM(s) SubCutaneous daily  lactated ringers. 500 milliLiter(s) (50 mL/Hr) IV Continuous <Continuous>  levETIRAcetam  IVPB 500 milliGRAM(s) IV Intermittent every 12 hours  methylPREDNISolone sodium succinate Injectable 40 milliGRAM(s) IV Push daily  pantoprazole  Injectable 40 milliGRAM(s) IV Push daily    MEDICATIONS  (PRN):  levalbuterol Inhalation 0.63 milliGRAM(s) Inhalation every 6 hours PRN SOB/Wheezing  metoprolol tartrate Injectable 5 milliGRAM(s) IV Push every 6 hours PRN Tachycardia > 110            LABORATORY:                          14.3   10.44 )-----------( 194      ( 08 Nov 2021 06:34 )             44.4     11-08    141  |  98  |  21  ----------------------------<  111<H>  4.2   |  27  |  0.81    Ca    9.5      08 Nov 2021 06:34  Phos  4.5     11-08  Mg     2.00     11-08    TPro  7.7  /  Alb  3.9  /  TBili  0.6  /  DBili  x   /  AST  16  /  ALT  11  /  AlkPhos  77  11-08            ABG - ( 07 Nov 2021 14:41 )  pH, Arterial: 7.45  pH, Blood: x     /  pCO2: 45    /  pO2: 96    / HCO3: 31    / Base Excess: 6.4   /  SaO2: 98.1              SARS-CoV-2: Chris (07 Nov 2021 13:56)

## 2021-11-08 NOTE — PROGRESS NOTE ADULT - PROBLEM SELECTOR PLAN 4
No S/S of urinary retention.  -Hold Flomax for now while strict NPO, resume if able to take PO meds  -Monitor urine output and renal functiont3

## 2021-11-08 NOTE — ED ADULT NURSE REASSESSMENT NOTE - NS ED NURSE REASSESS COMMENT FT1
no distress resting VSS bipap intact tolerated well at present   has been sleeping since ativan given

## 2021-11-08 NOTE — H&P ADULT - PROBLEM SELECTOR PLAN 6
-patient was on Escitalopram 10mg daily   -patient w/ documented history of MDD  -currently denying depression/anxiety and SI/HI  -monitor Patient was on Escitalopram 10 mg daily   -Patient w/ documented history of MDD  -Currently denying depression/anxiety and SI/HI  -Hold Escitalopram for now while strict NPO

## 2021-11-08 NOTE — PROGRESS NOTE ADULT - ASSESSMENT
74yo male w/ PMH of COPD (on 4L [place] home O2), ?pulm fibrosis, HTN, Depression, BPH, GERD, ?seizures (on Keppra 500mg BID) presents with SOB. Patient left against medical advice from [facility] 2 days ago. On NC 4L at baseline, presented to ED satting 65% on RA after running out of O2. Now satting 98% on BiPAP 12/6, remains tachypneic and tachycardic.

## 2021-11-09 PROCEDURE — 99233 SBSQ HOSP IP/OBS HIGH 50: CPT

## 2021-11-09 PROCEDURE — 99233 SBSQ HOSP IP/OBS HIGH 50: CPT | Mod: GC

## 2021-11-09 RX ORDER — PANTOPRAZOLE SODIUM 20 MG/1
40 TABLET, DELAYED RELEASE ORAL
Refills: 0 | Status: DISCONTINUED | OUTPATIENT
Start: 2021-11-09 | End: 2021-11-15

## 2021-11-09 RX ORDER — MONTELUKAST 4 MG/1
10 TABLET, CHEWABLE ORAL AT BEDTIME
Refills: 0 | Status: DISCONTINUED | OUTPATIENT
Start: 2021-11-09 | End: 2021-11-15

## 2021-11-09 RX ORDER — LEVETIRACETAM 250 MG/1
500 TABLET, FILM COATED ORAL
Refills: 0 | Status: DISCONTINUED | OUTPATIENT
Start: 2021-11-09 | End: 2021-11-15

## 2021-11-09 RX ORDER — BACITRACIN ZINC 500 UNIT/G
1 OINTMENT IN PACKET (EA) TOPICAL
Refills: 0 | Status: DISCONTINUED | OUTPATIENT
Start: 2021-11-09 | End: 2021-11-15

## 2021-11-09 RX ADMIN — PANTOPRAZOLE SODIUM 40 MILLIGRAM(S): 20 TABLET, DELAYED RELEASE ORAL at 11:41

## 2021-11-09 RX ADMIN — SODIUM CHLORIDE 50 MILLILITER(S): 9 INJECTION, SOLUTION INTRAVENOUS at 18:25

## 2021-11-09 RX ADMIN — Medication 40 MILLIGRAM(S): at 05:20

## 2021-11-09 RX ADMIN — LEVALBUTEROL 0.63 MILLIGRAM(S): 1.25 SOLUTION, CONCENTRATE RESPIRATORY (INHALATION) at 19:59

## 2021-11-09 RX ADMIN — LEVETIRACETAM 500 MILLIGRAM(S): 250 TABLET, FILM COATED ORAL at 22:24

## 2021-11-09 RX ADMIN — LEVETIRACETAM 400 MILLIGRAM(S): 250 TABLET, FILM COATED ORAL at 10:24

## 2021-11-09 RX ADMIN — MONTELUKAST 10 MILLIGRAM(S): 4 TABLET, CHEWABLE ORAL at 22:24

## 2021-11-09 RX ADMIN — Medication 10 MILLIGRAM(S): at 12:39

## 2021-11-09 RX ADMIN — ENOXAPARIN SODIUM 40 MILLIGRAM(S): 100 INJECTION SUBCUTANEOUS at 11:40

## 2021-11-09 NOTE — PROGRESS NOTE ADULT - SUBJECTIVE AND OBJECTIVE BOX
CHIEF COMPLAINT:  hypoxic respiratory failure     Interval events:  - patient seen and examined this AM, on 4 L nasal cannula  - patient states his breathing is comfortable and that he is on 3-4 L O2 at home    PAST MEDICAL & SURGICAL HISTORY:  Hypertension    Hypothyroid    BPH (benign prostatic hypertrophy)    COPD with hypoxia    HTN (hypertension)    Benign prostate hyperplasia    GERD (gastroesophageal reflux disease)    Depression, major    No significant past surgical history    No significant past surgical history        FAMILY HISTORY:  No pertinent family history in first degree relatives        SOCIAL HISTORY:  Smoking: [ ] Never Smoked [x ] Former Smoker    Allergies    No Known Allergies    Intolerances      HOME MEDICATIONS:  Home Medications:  alfuzosin extended release 10 mg oral tablet, extended release: 1 tab(s) orally once a day (06 Aug 2015 06:21)  atenolol 25 mg oral tablet: 1 tab(s) orally 2 times a day (Filled via [facility] on 9/11/21 x 1 month) (08 Nov 2021 11:34)  atenolol 50 mg oral tablet: 1 tab(s) orally once a day (06 Aug 2015 06:21)  doxazosin 8 mg oral tablet: 1 tab(s) orally once (at bedtime) (06 Aug 2015 06:21)  escitalopram 10 mg oral tablet: 1 tab(s) orally once a day (Filled via [facility] on 10/7/21 x 1 month) (08 Nov 2021 11:34)  famotidine 20 mg oral tablet: 1 tab(s) orally 2 times a day (Filled via [facility] on 10/7/21 x 1 month) (08 Nov 2021 11:34)  Keppra 500 mg oral tablet: 1 tab(s) orally 2 times a day (Filled via [facility] on 10/7/21 x 1 month) (08 Nov 2021 11:34)  levothyroxine 112 mcg (0.112 mg) oral tablet: 1 tab(s) orally once a day (Filled via [facility] in May/2021 x 3 months) (08 Nov 2021 11:34)  levothyroxine 75 mcg (0.075 mg) oral tablet: 1 tab(s) orally once a day (06 Aug 2015 06:21)  Metoprolol Succinate ER 50 mg oral tablet, extended release: 1 tab(s) orally once a day (06 Aug 2015 06:21)  montelukast 10 mg oral tablet: 1 tab(s) orally once a day (Filled via [facility] on 10/7/21 x 1 month) (08 Nov 2021 11:34)  tamsulosin 0.4 mg oral capsule: 1 cap(s) orally once a day (Filled via [facility] on 8/30/21 x 3 months) (08 Nov 2021 11:34)  Trelegy Ellipta 200 mcg-62.5 mcg-25 mcg/inh inhalation powder: 1 puff(s) inhaled once a day (08 Nov 2021 11:34)  Ventolin HFA 90 mcg/inh inhalation aerosol: 2 puff(s) inhaled every 6 hours, As Needed (Filled via [facility] on 10/7/21 x 1 month) (08 Nov 2021 11:34)      REVIEW OF SYSTEMS:  Constitutional: [ ] negative [ ] fevers [ ] chills [ ] weight loss [ ] weight gain  HEENT: [ ] negative [ ] dry eyes [ ] eye irritation [ ] postnasal drip [ ] nasal congestion  CV: [ ] negative  [ ] chest pain [ ] orthopnea [ ] palpitations [ ] murmur  Resp: [ ] negative [ ] cough [x ] shortness of breath [ ] dyspnea [ ] wheezing [ ] sputum [ ] hemoptysis  GI: [ ] negative [ ] nausea [ ] vomiting [ ] diarrhea [ ] constipation [ ] abd pain [ ] dysphagia   : [ ] negative [ ] dysuria [ ] nocturia [ ] hematuria [ ] increased urinary frequency  Musculoskeletal: [ ] negative [ ] back pain [ ] myalgias [ ] arthralgias [ ] fracture  Skin: [ ] negative [ ] rash [ ] itch  Neurological: [ ] negative [ ] headache [ ] dizziness [ ] syncope [ ] weakness [ ] numbness  Psychiatric: [ ] negative [ ] anxiety [ ] depression  Endocrine: [ ] negative [ ] diabetes [ ] thyroid problem  Hematologic/Lymphatic: [ ] negative [ ] anemia [ ] bleeding problem  Allergic/Immunologic: [ ] negative [ ] itchy eyes [ ] nasal discharge [ ] hives [ ] angioedema  [ ] All other systems negative  [ ] Unable to assess ROS because ________    OBJECTIVE:  ICU Vital Signs Last 24 Hrs  T(C): 36.9 (08 Nov 2021 17:27), Max: 36.9 (07 Nov 2021 19:00)  T(F): 98.5 (08 Nov 2021 17:27), Max: 98.5 (08 Nov 2021 17:27)  HR: 106 (08 Nov 2021 17:27) (84 - 120)  BP: 118/85 (08 Nov 2021 17:27) (109/87 - 143/72)  BP(mean): --  ABP: --  ABP(mean): --  RR: 17 (08 Nov 2021 17:27) (17 - 60)  SpO2: 100% (08 Nov 2021 17:27) (97% - 100%)        11-08 @ 07:01  -  11-08 @ 17:54  --------------------------------------------------------  IN: 0 mL / OUT: 450 mL / NET: -450 mL      CAPILLARY BLOOD GLUCOSE          PHYSICAL EXAM:  General:  no acute distress, thin,   HEENT: atraumatic, normocephalic, poor dentition  Respiratory:  mild crackles bilaterally but otherwise no significant wheezing, no use of accessory muscles of respiration   Cardiovascular:  RRR, no rubs, murmurs, gallops   Abdomen: thin, non-distended, non-tedner   Extremities: thin, no edema   Skin: no rashes, cyanosis  Neurological: no gross/focal deficits appreciated       LINES:     HOSPITAL MEDICATIONS:  Standing Meds:  enoxaparin Injectable 40 milliGRAM(s) SubCutaneous daily  lactated ringers. 500 milliLiter(s) IV Continuous <Continuous>  levETIRAcetam  IVPB 500 milliGRAM(s) IV Intermittent every 12 hours  methylPREDNISolone sodium succinate Injectable 40 milliGRAM(s) IV Push daily  pantoprazole  Injectable 40 milliGRAM(s) IV Push daily      PRN Meds:  levalbuterol Inhalation 0.63 milliGRAM(s) Inhalation every 6 hours PRN  metoprolol tartrate Injectable 5 milliGRAM(s) IV Push every 6 hours PRN      LABS:                        14.3   10.44 )-----------( 194      ( 08 Nov 2021 06:34 )             44.4     Hgb Trend: 14.3<--, 14.1<--  11-08    141  |  98  |  21  ----------------------------<  111<H>  4.2   |  27  |  0.81    Ca    9.5      08 Nov 2021 06:34  Phos  4.5     11-08  Mg     2.00     11-08    TPro  7.7  /  Alb  3.9  /  TBili  0.6  /  DBili  x   /  AST  16  /  ALT  11  /  AlkPhos  77  11-08    Creatinine Trend: 0.81<--, 0.78<--      Arterial Blood Gas:  11-07 @ 14:41  7.45/45/96/31/98.1/6.4  ABG lactate: --    Venous Blood Gas:  11-08 @ 06:34  7.39/53/116/32/98.1  VBG Lactate: 1.8  Venous Blood Gas:  11-07 @ 21:24  7.38/53/43/31/75.1  VBG Lactate: 3.0  Venous Blood Gas:  11-07 @ 13:24  7.38/60/23/36/36.2  VBG Lactate: 1.8      MICROBIOLOGY:       RADIOLOGY:  [ ] Reviewed and interpreted by me    PULMONARY FUNCTION TESTS:    EKG:

## 2021-11-09 NOTE — PHYSICAL THERAPY INITIAL EVALUATION ADULT - DISCHARGE DISPOSITION, PT EVAL
return to long term care facility; anticipate no skilled PT Needs - however, gait assessment remains pending

## 2021-11-09 NOTE — PROGRESS NOTE ADULT - ASSESSMENT
76yo male w/ PMH of COPD (on 4L NC home O2), ?pulm fibrosis, HTN, Depression, BPH, GERD, ?seizures (on Keppra 500mg BID) presents with SOB. Patient left against medical advice from [facility] 2 days ago. On NC 4L at baseline, presented to ED satting 65% on RA after running out of O2. Now satting 98% on BiPAP 12/6, remains tachypneic and tachycardic.  76yo male w/ PMH of COPD (on 4L NC home O2), ?pulm fibrosis, HTN, Depression, BPH, GERD, ?seizures (on Keppra 500mg BID) presents with SOB. Patient left against medical advice from [facility] 2 days ago. On NC 4L at baseline, presented to ED satting 65% on RA after running out of O2. Initially placed on BiPAP. MiICU consulted - deemed to be not a candidate for higher level fo care. Admitted to medicine for respiratory failure 2' pulmonary fibrosis and running out of home O2.

## 2021-11-09 NOTE — PROGRESS NOTE ADULT - PROBLEM SELECTOR PLAN 8
-DVT Proph: Lovenox   -Diet: NPO for now, pending SLP re-eval.  -was on reg diet w/ thin liquids at [facility]  -SW consult -patient left against medical advice from NH  -Spoke w/ kavon Dooley- reviewed recent events. States pt is from NH in [facility]. He had called family stating he doesn't want to be there anymore. So family took him out of the facility. Since then, as noted in history - he has run out of O2 and now admitted for acute respiratory failure. Family's plan is to ultimately send him back to NH as they cannot care for him. Will need to discuss w/ niece, Safia (who seems to be the main person coordinating his care) for collateral.  -med rec pharmacy emailed - pending additinoal collateral from NH to finalize med rec. -DVT Proph: Lovenox   GI: Protonix.

## 2021-11-09 NOTE — PHYSICAL THERAPY INITIAL EVALUATION ADULT - LEVEL OF INDEPENDENCE: GAIT, REHAB EVAL
deferred secondary to +tachypnea s/p sit-stand transfer. Unable to relax despite cues for deep breathing. RN Made aware, to follow up with provider.

## 2021-11-09 NOTE — CHART NOTE - NSCHARTNOTEFT_GEN_A_CORE
Notified by RN that patient A&O x 2, per documentation patient A&O x 3-4. Patient seen and examined at bedside. Writer spoke with patient in Cuban per patient request. Patient alert and oriented to person, place, situation and knows year; unsure of month. Patient in no acute distress and does not complain of any shortness of breath. Patient appears to be resting comfortably. Will monitor patient overnight.     Vital Signs Last 24 Hrs  T(C): 36.5 (09 Nov 2021 21:32), Max: 36.6 (09 Nov 2021 01:22)  T(F): 97.7 (09 Nov 2021 21:32), Max: 97.8 (09 Nov 2021 01:22)  HR: 82 (09 Nov 2021 21:32) (71 - 102)  BP: 112/59 (09 Nov 2021 21:32) (112/59 - 125/72)  RR: 17 (09 Nov 2021 21:32) (17 - 18)  SpO2: 100% (09 Nov 2021 21:32) (95% - 100%) Notified by RN that patient A&O x 2, per documentation patient A&O x 3-4. Patient seen and examined at bedside. Writer spoke with patient in Japanese per patient request. Patient alert and oriented to person, place, situation and knows year; unsure of month. Patient in no acute distress and does not complain of any shortness of breath. Patient appears to be resting comfortably. Will monitor patient overnight.     Vital Signs Last 24 Hrs  T(C): 36.5 (09 Nov 2021 21:32), Max: 36.6 (09 Nov 2021 01:22)  T(F): 97.7 (09 Nov 2021 21:32), Max: 97.8 (09 Nov 2021 01:22)  HR: 82 (09 Nov 2021 21:32) (71 - 102)  BP: 112/59 (09 Nov 2021 21:32) (112/59 - 125/72)  RR: 17 (09 Nov 2021 21:32) (17 - 18)  SpO2: 100% (09 Nov 2021 21:32) (95% - 100%)    Update: Notified by RN that patient tachypneic to 50s. RN endorses possible anxiety. Advised to recheck in 20 mins. RR improved. Patient seen at bedside later, RR 18, sleeping comfortably. Will monitor patient closely.

## 2021-11-09 NOTE — SWALLOW BEDSIDE ASSESSMENT ADULT - SWALLOW EVAL: DIAGNOSIS
Patient accepted trials of puree, and presents with suspected pharyngeal dysphagia. Oral phase was unremarkable characterized by adequate bolus acceptance and anterior containment with good bolus formation and A-P transport. Pharyngeal phase characterized by hyolaryngeal elevation and excursion upon digital palpation with initiation of pharyngeal swallow. There were no overt signs or symptoms of laryngeal penetration/aspiration for trials of puree. However, after PO trials, patient desaturated to 81% SPO2. PO trials were terminated, made respiratory therapy and RN aware. SPO2 returned to  98% upon exiting room. Patient left in no respiratory distress, respiratory therapist at bedside.
Patient presents with mild oropharyngeal dysphagia. Patient accepted trials of thin, mildly thick and moderately thick liquids, pureed, minced and moist and soft and bite sized solids with adequate bolus acceptance and anterior containment. Prolonged and effortful mastication observed for soft and bite sized solids. Mastication, bolus formation, and A-P transport functional across all other consistencies. Pharyngeal phase characterized by hyolaryngeal elevation and excursion upon digital palpation, suspected delay in swallow onset for thin liquids. Delayed cough response noted for thin liquids indicative of laryngeal penetration/aspiration. There was no clinically overt signs or symptoms of laryngeal penetration/aspiration for mildly or moderately thickened liquids, pureed, minced and moist and soft and bite sized solids.

## 2021-11-09 NOTE — PROGRESS NOTE ADULT - PROBLEM SELECTOR PLAN 2
On 4L NC home O2. Now possibly with exacerbation in setting of not having adequate oxygenation.  -Previously on Prednisone taper at NH  -Start IV Solumedrol 40 mg daily, also given pulm fibrosis seen on CTA chest and wheezing on exam  -C/w Xopenex PRN   -Resume Singulair 10 mg daily when pt able to take PO meds  -CT showing likely ILD - reviewed [employer] - -F/u with Dr Nesbitt @ [facility]. Will f/u pulm for further recs. On 4L NC home O2. Now possibly with exacerbation in setting of not having adequate oxygenation.  -Previously on Prednisone taper at NH  -C/w Xopenex PRN   -Resume Singulair 10 mg daily

## 2021-11-09 NOTE — PROGRESS NOTE ADULT - PROBLEM SELECTOR PLAN 3
Pt on Atenolol 25 mg q12hrs outpatient. BPs in acceptable range overnight. HR improved.  -Hold Atenolol for now while strict NPO  -Will do IV Metoprolol 5 mg q6hrs PRN for HR > 110, hold SBP < 110  -Vitals q4hrs Pt on Atenolol 25 mg q12hrs outpatient. BPs in acceptable range overnight. HR improved.  -Resume atenolol if necessary. For now, can continue to hold.

## 2021-11-09 NOTE — PROGRESS NOTE ADULT - ASSESSMENT
Zain Bryant is a 74yo male w/ PMH of COPD, ?pulm fibrosis, HTN, Depression, BPH, GERD, ?seizures (on Keppra 500mg BID)  who presented to [facility] 21 with shortness of breath.  CT imaging on exam was concerning for NSIP pattern for which pulmonology has been consulted.    #Interstitial lung disease  - patient w/imaging on exam concerning for NSIP, has known history of ILD and follows with outpatient pulmonology   - CT chest this admission concerning for overall progression of chronic disease when compared to CT chest in 2017, but does not have features that would suggest acute exacerbation  - infectious work-up thus far negative,  - patient initiated on solumedrol 40 mg daily with reported improvement in symptoms as above--increased to 50 mg daily as of 21  - patient reports that he is on 3-4 L nasal cannula at home and so current presentation is consistent with baseline     Recommendations  - continue solumedrol 50 mg daily for now given initial improvement in symptoms per patient report   - would maintain net even-net negative fluid balance  - please provide and encourage use of incentive spirometry  - f/u echocardiogram   - wean FiO2 as tolerated  - patient will likely benefit from short steroid taper upon discharge, consider, at the time of discharge: prednisone 40 mg daily x5 days, prednisone 30 mg daily x5 days, then prednisone 20 mg daily x5 days   - at the time of discharge please ensure patient has outpatient follow up with pulmonologist Dr. Nesbitt, please email [email or web address] with patient's name, , MRN, and contact information to set up appointment     Pulmonary to sign off at this point    Carl West PGY4  [id number] Zain Bryant is a 76yo male w/ PMH of COPD, ?pulm fibrosis, HTN, Depression, BPH, GERD, ?seizures (on Keppra 500mg BID)  who presented to [facility] 21 with shortness of breath.  CT imaging on exam was concerning for NSIP pattern for which pulmonology has been consulted.    #Interstitial lung disease  - patient w/imaging on exam concerning for NSIP, has known history of ILD and follows with outpatient pulmonology   - CT chest this admission concerning for overall progression of chronic disease when compared to CT chest in 2017, but does not have features that would suggest acute exacerbation  - infectious work-up thus far negative,  - patient initiated on solumedrol 40 mg daily with reported improvement in symptoms as above--increased to 50 mg daily as of 21  - patient reports that he is on 3-4 L nasal cannula at home and so current presentation is consistent with baseline     Recommendations  - continue solumedrol 50 mg daily for now given initial improvement in symptoms per patient report   - would maintain net even-net negative fluid balance  - please provide and encourage use of incentive spirometry  - f/u echocardiogram   - wean FiO2 as tolerated  - patient will likely benefit from short steroid taper upon discharge, consider, at the time of discharge: prednisone 40 mg daily x5 days, prednisone 30 mg daily x5 days, then prednisone 20 mg daily x5 days   - at the time of discharge please ensure patient has outpatient follow up with pulmonologist Dr. Nesbitt, or if not, can email [email or web address] with patient's name, , MRN, and contact information to set up appointment     Pulmonary to sign off at this point    Carl West PGY4  [phone]

## 2021-11-09 NOTE — PHYSICAL THERAPY INITIAL EVALUATION ADULT - GENERAL OBSERVATIONS, REHAB EVAL
Pt received semi-lagunas in bed, nasal cannula, IV, tele monitor with , NAD. Pt agreeable to PT consultation. Cleared for PT as per SHAN Calzada

## 2021-11-09 NOTE — PROGRESS NOTE ADULT - SUBJECTIVE AND OBJECTIVE BOX
[facility] Division of Hospital Medicine  Valentin Larson) MD Marco A  Pager [phone]    SUBJECTIVE:  Chief complaint: SOB.     ID: [id number]  Pt seen and evaluated at bedside this AM. SW/LAWRENCE/RN manager present at bedside as well. No o/n events. States his breathing is worse today.     ROS: All systems negative except as noted.      Vital Signs Last 24 Hrs  T(C): 36.3 (09 Nov 2021 14:10), Max: 36.9 (08 Nov 2021 17:27)  T(F): 97.3 (09 Nov 2021 14:10), Max: 98.5 (08 Nov 2021 17:27)  HR: 78 (09 Nov 2021 14:10) (71 - 106)  BP: 120/71 (09 Nov 2021 14:10) (118/85 - 123/66)  BP(mean): --  RR: 17 (09 Nov 2021 14:10) (17 - 26)  SpO2: 100% (09 Nov 2021 14:10) (95% - 100%)    PHYSICAL EXAM:  Gen- In bed, comfortable, NAD  Eyes- EOMI, PERRLA, nonicteric.  EMNT- Fair dentition. MMM. No tonsilar exudates. No posterior pharynx erythema.  Neck- Supple. No masses. No tracheal deviation.  Resp- Faint crackles,clear. Good effort. No r/r/w. No accessory muscle use.  CVS- RRR, S1S2, no g/r/m. No LE edema.  GI- Soft abd, NT, ND, +BSx4. No HSM.  MSK- No C/C. ROM intact. No crepitus.  Neuro- CN II-XII intact. Speech fluent/face symmetric. Sensation intact.  Skin- No rashes/ulcers. Warm/moist.  Psych- Appropriate mood/affect.      MEDICATION:  MEDICATIONS  (STANDING):  dextrose 5% + lactated ringers. 1000 milliLiter(s) (50 mL/Hr) IV Continuous <Continuous>  enoxaparin Injectable 40 milliGRAM(s) SubCutaneous daily  levETIRAcetam  IVPB 500 milliGRAM(s) IV Intermittent every 12 hours  methylPREDNISolone sodium succinate Injectable 50 milliGRAM(s) IV Push daily  pantoprazole  Injectable 40 milliGRAM(s) IV Push daily    MEDICATIONS  (PRN):  levalbuterol Inhalation 0.63 milliGRAM(s) Inhalation every 6 hours PRN SOB/Wheezing  metoprolol tartrate Injectable 5 milliGRAM(s) IV Push every 6 hours PRN Tachycardia > 110        LABORATORY:                          14.3   10.44 )-----------( 194      ( 08 Nov 2021 06:34 )             44.4     11-08    141  |  98  |  21  ----------------------------<  111<H>  4.2   |  27  |  0.81    Ca    9.5      08 Nov 2021 06:34  Phos  4.5     11-08  Mg     2.00     11-08    TPro  7.7  /  Alb  3.9  /  TBili  0.6  /  DBili  x   /  AST  16  /  ALT  11  /  AlkPhos  77  11-08    SARS-CoV-2: Chris (07 Nov 2021 13:56)             [facility] Division of Hospital Medicine  Valentin Larson) MD Marco A  Pager [phone]    SUBJECTIVE:  Chief complaint: SOB.     ID: [id number]  Pt seen and evaluated at bedside this AM. SW/LAWRENCE/RN manager present at bedside as well. No o/n events. States his breathing is worse today.     ROS: All systems negative except as noted.      Vital Signs Last 24 Hrs  T(C): 36.3 (09 Nov 2021 14:10), Max: 36.9 (08 Nov 2021 17:27)  T(F): 97.3 (09 Nov 2021 14:10), Max: 98.5 (08 Nov 2021 17:27)  HR: 78 (09 Nov 2021 14:10) (71 - 106)  BP: 120/71 (09 Nov 2021 14:10) (118/85 - 123/66)  BP(mean): --  RR: 17 (09 Nov 2021 14:10) (17 - 26)  SpO2: 100% (09 Nov 2021 14:10) (95% - 100%)    PHYSICAL EXAM:  Gen- In bed, comfortable, NAD  Eyes- EOMI, PERRLA, nonicteric.  EMNT- Fair dentition. MMM. No tonsilar exudates. No posterior pharynx erythema.  Neck- Supple. No masses. No tracheal deviation.  Resp- Faint crackles,clear. Good effort. No r/r/w. No accessory muscle use.  CVS- RRR, S1S2, no g/r/m. No LE edema.  GI- Soft abd, NT, ND, +BSx4. No HSM.  MSK- No C/C. ROM intact. No crepitus.  Neuro- CN II-XII intact. Speech fluent/face symmetric. Sensation intact.  Skin- No rashes/ulcers. Warm/moist.  Psych- Appropriate mood/affect.      MEDICATION:  MEDICATIONS  (STANDING):  dextrose 5% + lactated ringers. 1000 milliLiter(s) (50 mL/Hr) IV Continuous <Continuous>  enoxaparin Injectable 40 milliGRAM(s) SubCutaneous daily  levETIRAcetam  IVPB 500 milliGRAM(s) IV Intermittent every 12 hours  methylPREDNISolone sodium succinate Injectable 50 milliGRAM(s) IV Push daily  pantoprazole  Injectable 40 milliGRAM(s) IV Push daily    MEDICATIONS  (PRN):  levalbuterol Inhalation 0.63 milliGRAM(s) Inhalation every 6 hours PRN SOB/Wheezing  metoprolol tartrate Injectable 5 milliGRAM(s) IV Push every 6 hours PRN Tachycardia > 110      LABORATORY:                          14.3   10.44 )-----------( 194      ( 08 Nov 2021 06:34 )             44.4     11-08    141  |  98  |  21  ----------------------------<  111<H>  4.2   |  27  |  0.81    Ca    9.5      08 Nov 2021 06:34  Phos  4.5     11-08  Mg     2.00     11-08    TPro  7.7  /  Alb  3.9  /  TBili  0.6  /  DBili  x   /  AST  16  /  ALT  11  /  AlkPhos  77  11-08              SARS-CoV-2: Chris (07 Nov 2021 13:56)

## 2021-11-09 NOTE — PROGRESS NOTE ADULT - PROBLEM SELECTOR PLAN 1
-Blood gas parameters have improved. CTA chest with IV contrast negative for PE, likely ILD/fibrosis   -Seen by MICU - not a candidate  -Currently on 4L by NC (baseline).   -States he's feeling SOB today.  -Steroid dose increased to solumedrol 50 daily today. Reassess response.   -Continuous pulse ox monitoring  -SLP re-eval. -Blood gas parameters have improved. CTA chest with IV contrast negative for PE, likely ILD/fibrosis   -Seen by MICU - not a candidate  -Remains stable on 4L by NC.  -States he's feeling SOB today- examination unchanged.  -Steroid dose increased to solumedrol 50 daily today. Reassess response.   -Continuous pulse ox monitoring - sats have been excellent.  -SLP re-eval appreciated - resumed on modified diet.   -Pulm recs appreciated. Continue w/ supportive measures. Chart reviewed and per discussion with family (Merry/niece and Soumya/daughter)- pt follows w/ outside pulmonologist. Discussed prognosis of condition. Family aware that condition would not improve and interventions are supportive.

## 2021-11-09 NOTE — SWALLOW BEDSIDE ASSESSMENT ADULT - ASR SWALLOW RECOMMEND DIAG
NOT indicated at this time due to poor tolerance of PO trials at bedside/VFSS/MBS
NOT indicated due to overt signs and symptoms of laryngeal penetration/aspiration at bedside/VFSS/MBS

## 2021-11-09 NOTE — PHYSICAL THERAPY INITIAL EVALUATION ADULT - ADDITIONAL COMMENTS
As per EMR, pt recently left NH as long term care resident. As per EMR, pt recently left NH as long term care resident. Requires 4L O2 via nasal cannula at baseline. As per pt: Pt was independent in functional activities prior to admission without use of assistive device.     Pt left in bed with head of bed elevated, lines and tubes intact, NAD. RN present and aware of session. +call bell. SpO2 maintained between % during session despite +tachypnea. (?anxiety)

## 2021-11-09 NOTE — SWALLOW BEDSIDE ASSESSMENT ADULT - COMMENTS
CXR 11/7/21:  Diffuse interstitial lung disease.  CT Angio Chest 11/7/21 "No main, right, left, lobar or proximal segmental pulmonary embolism. Limited evaluation of the distal segmental and subsegmental branches due to obscuration by respiratory motion. Fibrosing interstitial lung disease, most consistent with NSIP. The predilection for the anterior upper lobes raises the possibility of connective tissue disease associated ILD.  Indeterminate nodular thickening of the major fissures, possibly related to fibrosis. 3 month follow-up is recommended to assess for stability. Mild esophageal dilatation."    Patient seen for bedside swallow assessment. Initially encountered on non rebreather at SPO2 ~100%. Discussed with RN who transferred patient to 6 LPM O2 via nasal cannula. Patient tolerated for 20 minutes prior to initiation of PO trials, at 100% SPO2.   Of note, Respiratory therapist was present for evaluation. Of note, patient reported some pain while swallowing.
As per H&P 11/8/21 "74yo male w/ PMH of COPD, ?pulm fibrosis, HTN, Depression, BPH, GERD, ?seizures (on Keppra 500mg BID)  presents with SOB.  #[id number] used. Patient on BiPAP satting 98%, interaction limited 2/2 tachypnea. Patient gave writer permission to speak w/ family. Patient endorses SOB and confirms that he uses O2 at baseline but does not know amount. Denies chest pain, HA, cough, fever, abdominal pain, N/V, diarrhea, constipation, urinary burning.   Daughter Santa ([phone]) does not know father's current medical history and has not seen him since April, states he has lung problems and has "been deteriorating". Left voicemail for Niece- Safia Bryant ([phone]) and Daughter Soumya ([phone]) who daughter says knows father's medical history and assists with appointments. Called [facility] for information- overnight charge SHAN Pearson can only off that patient left against medical advice from [facility] on 11/6 ahd HCP/Niece gave verbal consent. [facility] home documentation and ED documentations used to obtain collateral.  Patient on NC 4L at baseline, after leaving NH family "ran out" of O2 and patient became SOB and they brought him to ED."  CXR 11/7/21:  Diffuse interstitial lung disease.      Patient known to this service, seen for bedside swallow assessment 11/8/21 with recommendations for consideration of alternate means of nutrition/hydration/medication. See report for details.     Patient received upright in bed 4 LPM O2 via nasal cannula. SPO2 remained ~100% throughout assessment. Language Line  #[id number] used for interpretation into [place] citizen.

## 2021-11-09 NOTE — PHYSICAL THERAPY INITIAL EVALUATION ADULT - PERTINENT HX OF CURRENT PROBLEM, REHAB EVAL
75 year old male with PMH of COPD (on 4L NC home O2), ?pulmonary fibrosis, HTN, Depression, BPH, GERD, ?seizures presents with SOB. Patient left against medical advice from [facility] 2 days ago. On NC 4L at baseline, presented to ED satting 65% on Room air after running out of O2.

## 2021-11-09 NOTE — SWALLOW BEDSIDE ASSESSMENT ADULT - ADDITIONAL RECOMMENDATIONS
1) Meticulous oral care in order to prevent colonization of bacteria in oral cavity.
1) Monitor for PO tolerance/intake   2) Monitor for change in neurological or respiratory status that may impact PO

## 2021-11-09 NOTE — PROGRESS NOTE ADULT - PROBLEM SELECTOR PLAN 4
No S/S of urinary retention.  -Hold Flomax for now while strict NPO, resume if able to take PO meds  -Monitor urine output and renal functiont3 No S/S of urinary retention.  -Await confirmation of meds - can start flomax equivalent.  -Monitor urine output and renal functiont

## 2021-11-09 NOTE — SWALLOW BEDSIDE ASSESSMENT ADULT - SWALLOW EVAL: RECOMMENDED DIET
Consider short term means of nutrition/hydration/medication as PO is contraindicated at this time.
minced and moist with mildly thickened liquids

## 2021-11-10 LAB
ALBUMIN SERPL ELPH-MCNC: 3.5 G/DL — SIGNIFICANT CHANGE UP (ref 3.3–5)
ALP SERPL-CCNC: 60 U/L — SIGNIFICANT CHANGE UP (ref 40–120)
ALT FLD-CCNC: 17 U/L — SIGNIFICANT CHANGE UP (ref 4–41)
ANION GAP SERPL CALC-SCNC: 9 MMOL/L — SIGNIFICANT CHANGE UP (ref 7–14)
AST SERPL-CCNC: 18 U/L — SIGNIFICANT CHANGE UP (ref 4–40)
BASOPHILS # BLD AUTO: 0.01 K/UL — SIGNIFICANT CHANGE UP (ref 0–0.2)
BASOPHILS NFR BLD AUTO: 0.1 % — SIGNIFICANT CHANGE UP (ref 0–2)
BILIRUB SERPL-MCNC: 0.9 MG/DL — SIGNIFICANT CHANGE UP (ref 0.2–1.2)
BLOOD GAS ARTERIAL - LYTES,HGB,ICA,LACT RESULT: SIGNIFICANT CHANGE UP
BUN SERPL-MCNC: 19 MG/DL — SIGNIFICANT CHANGE UP (ref 7–23)
CALCIUM SERPL-MCNC: 9.1 MG/DL — SIGNIFICANT CHANGE UP (ref 8.4–10.5)
CHLORIDE SERPL-SCNC: 100 MMOL/L — SIGNIFICANT CHANGE UP (ref 98–107)
CO2 SERPL-SCNC: 31 MMOL/L — SIGNIFICANT CHANGE UP (ref 22–31)
CREAT SERPL-MCNC: 0.74 MG/DL — SIGNIFICANT CHANGE UP (ref 0.5–1.3)
D DIMER BLD IA.RAPID-MCNC: <150 NG/ML DDU — SIGNIFICANT CHANGE UP
EOSINOPHIL # BLD AUTO: 0.1 K/UL — SIGNIFICANT CHANGE UP (ref 0–0.5)
EOSINOPHIL NFR BLD AUTO: 1 % — SIGNIFICANT CHANGE UP (ref 0–6)
GLUCOSE BLDC GLUCOMTR-MCNC: 102 MG/DL — HIGH (ref 70–99)
GLUCOSE SERPL-MCNC: 104 MG/DL — HIGH (ref 70–99)
HCT VFR BLD CALC: 39.7 % — SIGNIFICANT CHANGE UP (ref 39–50)
HCV AB S/CO SERPL IA: 0.12 S/CO — SIGNIFICANT CHANGE UP (ref 0–0.99)
HCV AB SERPL-IMP: SIGNIFICANT CHANGE UP
HGB BLD-MCNC: 12.9 G/DL — LOW (ref 13–17)
IANC: 7.61 K/UL — SIGNIFICANT CHANGE UP (ref 1.5–8.5)
IMM GRANULOCYTES NFR BLD AUTO: 0.4 % — SIGNIFICANT CHANGE UP (ref 0–1.5)
LYMPHOCYTES # BLD AUTO: 1.31 K/UL — SIGNIFICANT CHANGE UP (ref 1–3.3)
LYMPHOCYTES # BLD AUTO: 13 % — SIGNIFICANT CHANGE UP (ref 13–44)
MAGNESIUM SERPL-MCNC: 2.2 MG/DL — SIGNIFICANT CHANGE UP (ref 1.6–2.6)
MCHC RBC-ENTMCNC: 29.6 PG — SIGNIFICANT CHANGE UP (ref 27–34)
MCHC RBC-ENTMCNC: 32.5 GM/DL — SIGNIFICANT CHANGE UP (ref 32–36)
MCV RBC AUTO: 91.1 FL — SIGNIFICANT CHANGE UP (ref 80–100)
MONOCYTES # BLD AUTO: 1.03 K/UL — HIGH (ref 0–0.9)
MONOCYTES NFR BLD AUTO: 10.2 % — SIGNIFICANT CHANGE UP (ref 2–14)
NEUTROPHILS # BLD AUTO: 7.61 K/UL — HIGH (ref 1.8–7.4)
NEUTROPHILS NFR BLD AUTO: 75.3 % — SIGNIFICANT CHANGE UP (ref 43–77)
NRBC # BLD: 0 /100 WBCS — SIGNIFICANT CHANGE UP
NRBC # FLD: 0 K/UL — SIGNIFICANT CHANGE UP
PHOSPHATE SERPL-MCNC: 2.5 MG/DL — SIGNIFICANT CHANGE UP (ref 2.5–4.5)
PLATELET # BLD AUTO: 174 K/UL — SIGNIFICANT CHANGE UP (ref 150–400)
POTASSIUM SERPL-MCNC: 3.8 MMOL/L — SIGNIFICANT CHANGE UP (ref 3.5–5.3)
POTASSIUM SERPL-SCNC: 3.8 MMOL/L — SIGNIFICANT CHANGE UP (ref 3.5–5.3)
PROT SERPL-MCNC: 6.6 G/DL — SIGNIFICANT CHANGE UP (ref 6–8.3)
RBC # BLD: 4.36 M/UL — SIGNIFICANT CHANGE UP (ref 4.2–5.8)
RBC # FLD: 12.2 % — SIGNIFICANT CHANGE UP (ref 10.3–14.5)
SODIUM SERPL-SCNC: 140 MMOL/L — SIGNIFICANT CHANGE UP (ref 135–145)
WBC # BLD: 10.1 K/UL — SIGNIFICANT CHANGE UP (ref 3.8–10.5)
WBC # FLD AUTO: 10.1 K/UL — SIGNIFICANT CHANGE UP (ref 3.8–10.5)

## 2021-11-10 PROCEDURE — 99233 SBSQ HOSP IP/OBS HIGH 50: CPT

## 2021-11-10 PROCEDURE — 71045 X-RAY EXAM CHEST 1 VIEW: CPT | Mod: 26

## 2021-11-10 PROCEDURE — 99232 SBSQ HOSP IP/OBS MODERATE 35: CPT

## 2021-11-10 PROCEDURE — 93010 ELECTROCARDIOGRAM REPORT: CPT

## 2021-11-10 RX ADMIN — PANTOPRAZOLE SODIUM 40 MILLIGRAM(S): 20 TABLET, DELAYED RELEASE ORAL at 06:57

## 2021-11-10 RX ADMIN — LEVALBUTEROL 0.63 MILLIGRAM(S): 1.25 SOLUTION, CONCENTRATE RESPIRATORY (INHALATION) at 03:56

## 2021-11-10 RX ADMIN — LEVETIRACETAM 500 MILLIGRAM(S): 250 TABLET, FILM COATED ORAL at 12:28

## 2021-11-10 RX ADMIN — LEVETIRACETAM 500 MILLIGRAM(S): 250 TABLET, FILM COATED ORAL at 22:39

## 2021-11-10 RX ADMIN — Medication 1 APPLICATION(S): at 16:42

## 2021-11-10 RX ADMIN — MONTELUKAST 10 MILLIGRAM(S): 4 TABLET, CHEWABLE ORAL at 21:03

## 2021-11-10 RX ADMIN — Medication 50 MILLIGRAM(S): at 05:40

## 2021-11-10 RX ADMIN — ENOXAPARIN SODIUM 40 MILLIGRAM(S): 100 INJECTION SUBCUTANEOUS at 12:28

## 2021-11-10 RX ADMIN — Medication 1 APPLICATION(S): at 05:40

## 2021-11-10 NOTE — PROVIDER CONTACT NOTE (OTHER) - SITUATION
pt. pt. awake, alert and oriented x2 , unable to state month but able to state a year, per previous documentation patient awake, alert and oriented x4
pt. becoming  tachypneic, RR-48,report chest pain, HR-118 bpm at one point , going back to 80s-90s
pt. becoming  tachypneic, RR-50

## 2021-11-10 NOTE — PROGRESS NOTE ADULT - ASSESSMENT
Zain Bryant is a 74yo male w/ PMH of COPD, ?pulm fibrosis, HTN, Depression, BPH, GERD, ?seizures (on Keppra 500mg BID)  who presented to [facility] 21 with shortness of breath.  CT imaging on exam was concerning for NSIP pattern for which pulmonology has been consulted.    #Interstitial lung disease  - patient w/imaging on exam concerning for NSIP, has known history of ILD and follows with outpatient pulmonology   - CT chest this admission concerning for overall progression of chronic disease when compared to CT chest in 2017, but does not have features that would suggest acute exacerbation  - infectious work-up thus far negative,  - patient initiated on solumedrol 40 mg daily with reported improvement in symptoms as above--increased to 50 mg daily as of 21  - patient reports that he is on 3-4 L nasal cannula at home and so current presentation is consistent with baseline     Recommendations  - continue solumedrol 50 mg daily for now given initial improvement in symptoms per patient report   - would maintain net even-net negative fluid balance  - please provide and encourage use of incentive spirometry  - f/u echocardiogram   - wean FiO2 as tolerated  - patient will likely benefit from short steroid taper upon discharge, consider, at the time of discharge: prednisone 40 mg daily x5 days, prednisone 30 mg daily x5 days, then prednisone 20 mg daily x5 days   - at the time of discharge please ensure patient has outpatient follow up with pulmonologist Dr. Nesbitt, or if not, can email [email or web address] with patient's name, , MRN, and contact information to set up appointment     Pulmonary to sign off at this point    Carl West PGY4  [id number] Zain Bryant is a 74yo male w/ PMH of COPD, ?pulm fibrosis, HTN, Depression, BPH, GERD, ?seizures (on Keppra 500mg BID)  who presented to [facility] 21 with shortness of breath.  CT imaging on exam was concerning for NSIP pattern for which pulmonology has been consulted.    #Interstitial lung disease  - patient w/imaging on exam concerning for NSIP, has known history of ILD and follows with outpatient pulmonology   - CT chest this admission concerning for overall progression of chronic disease when compared to CT chest in 2017, but does not have features that would suggest acute exacerbation  - infectious work-up thus far negative,  - patient initiated on solumedrol 40 mg daily with reported improvement in symptoms as above--increased to 50 mg daily as of 21  - patient reports that he is on 3-4 L nasal cannula at home and so current presentation is consistent with baseline     Recommendations  - continue solumedrol 50 mg daily for now given initial improvement in symptoms per patient report   - would maintain net even-net negative fluid balance  - please provide and encourage use of incentive spirometry  - f/u echocardiogram   - wean FiO2 as tolerated  - patient will likely benefit from short steroid taper upon discharge, consider, at the time of discharge: prednisone 40 mg daily x5 days, prednisone 30 mg daily x5 days, then prednisone 20 mg daily x5 days   - at the time of discharge please ensure patient has outpatient follow up with pulmonologist Dr. Nesbitt, or if not, can email [email or web address] with patient's name, , MRN, and contact information to set up appointment   - palliative consult given likely end-stage disease and persistent symptoms     Pulmonary to sign off at this point    Carl West PGY4  [id number] Zain Bryant is a 74yo male w/ PMH of COPD, ?pulm fibrosis, HTN, Depression, BPH, GERD, ?seizures (on Keppra 500mg BID)  who presented to [facility] 21 with shortness of breath.  CT imaging on exam was concerning for NSIP pattern for which pulmonology has been consulted.    #Interstitial lung disease  - unclear etiology of tachypnea overnight, patient reporting sensation of palpitations on exam this AM  - patient w/imaging on exam concerning for NSIP, has known history of ILD and follows with outpatient pulmonology   - CT chest this admission concerning for overall progression of chronic disease when compared to CT chest in 2017, but does not have features that would suggest acute exacerbation  - infectious work-up thus far negative,  - patient initiated on solumedrol 40 mg daily with reported improvement in symptoms as above--increased to 50 mg daily as of 21  - patient reports that he is on 3-4 L nasal cannula at home and so current presentation is consistent with baseline     Recommendations  - EKG to evaluate palpitations  - continue solumedrol 50 mg daily for now given initial improvement in symptoms per patient report   - would maintain net even-net negative fluid balance  - please provide and encourage use of incentive spirometry  - f/u echocardiogram   - wean FiO2 as tolerated  - patient will likely benefit from short steroid taper upon discharge, consider, at the time of discharge: prednisone 40 mg daily x5 days, prednisone 30 mg daily x5 days, then prednisone 20 mg daily x5 days   - at the time of discharge please ensure patient has outpatient follow up with pulmonologist Dr. Nesbitt, or if not, can email [email or web address] with patient's name, , MRN, and contact information to set up appointment   - palliative consult given likely end-stage disease and persistent symptoms     Pulmonary to sign off at this point    Carl West PGY4  [id number]

## 2021-11-10 NOTE — CHART NOTE - NSCHARTNOTEFT_GEN_A_CORE
Notified by RN that patient tachypneic again and has chest pain. Patient seen and examined at bedside. Writer communicates with patient in English. Patient denies chest pain states that he was endorsing SOB. Patient denies headache, dizziness or anxiety. Patient able to communicate in full sentences. On exam, patient tachypneic, not taking deep breaths. On auscultation, no wheezing, mild crackles noted. No accessory muscle use noted. EKG performed is unchanged from prior HR of 79. Will give Xopenex treatment. Encourage incentive spirometry use. Plan d/w RN. Encourage use of . Will monitor patient closely. Notified by RN that patient tachypneic again and has chest pain. Patient seen and examined at bedside. Writer communicates with patient in Marshallese. Patient denies chest pain states that he was endorsing SOB. Patient denies headache, dizziness or anxiety. Patient able to communicate in full sentences. On exam, patient tachypneic, not taking deep breaths. On auscultation, no wheezing, mild crackles noted. No accessory muscle use noted. EKG performed is unchanged from prior HR of 79. Will give Xopenex treatment. Encourage incentive spirometry use. Plan d/w RN. Encourage use of . Will monitor patient closely.    Vital Signs Last 24 Hrs  T(C): 36.4 (10 Nov 2021 03:25), Max: 36.5 (09 Nov 2021 21:32)  T(F): 97.5 (10 Nov 2021 03:25), Max: 97.7 (09 Nov 2021 21:32)  HR: 81 (10 Nov 2021 03:25) (71 - 92)  BP: 108/56 (10 Nov 2021 03:25) (108/56 - 131/66)  RR: 48 (10 Nov 2021 03:25) (17 - 50)  SpO2: 97% (10 Nov 2021 03:25) (95% - 100%) Notified by RN that patient tachypneic again and has chest pain. Patient seen and examined at bedside. Writer communicates with patient in Ch[place]. Patient denies chest pain states that he was endorsing SOB. Patient denies headache, dizziness or anxiety. Patient able to communicate in full sentences. On exam, patient tachypneic, not taking deep breaths. On auscultation, no wheezing, mild crackles noted. No accessory muscle use noted. EKG performed is unchanged from prior HR of 79. Will give Xopenex treatment. Encourage incentive spirometry use. Plan d/w RN. Encourage use of . Will monitor patient closely.    Vital Signs Last 24 Hrs  T(C): 36.4 (10 Nov 2021 03:25), Max: 36.5 (09 Nov 2021 21:32)  T(F): 97.5 (10 Nov 2021 03:25), Max: 97.7 (09 Nov 2021 21:32)  HR: 81 (10 Nov 2021 03:25) (71 - 92)  BP: 108/56 (10 Nov 2021 03:25) (108/56 - 131/66)  RR: 48 (10 Nov 2021 03:25) (17 - 50)  SpO2: 97% (10 Nov 2021 03:25) (95% - 100%)    Update: RRT called for hypoxia and tachycardia. Upon arrival of RRT team, patient saturating 96% on RA and HR 70-80. AM labs and ABG done during RRT. Patient due for Solu-medrol in am. Will reconsult pulmonology in am. STAT CXR ordered. RRT and ABG results d/w hospitalist in charge. Will monitor patient closely. Notified by RN that patient tachypneic again and has chest pain. Patient seen and examined at bedside. Writer communicates with patient in [place]anian. Patient denies chest pain states that he was endorsing SOB. Patient denies headache, dizziness or anxiety. Patient able to communicate in full sentences. On exam, patient tachypneic, not taking deep breaths. On auscultation, no wheezing, mild crackles noted. No accessory muscle use noted. EKG performed is unchanged from prior HR of 79. Will give Xopenex treatment. Encourage incentive spirometry use. Plan d/w RN. Encourage use of . Will monitor patient closely.    Vital Signs Last 24 Hrs  T(C): 36.4 (10 Nov 2021 03:25), Max: 36.5 (09 Nov 2021 21:32)  T(F): 97.5 (10 Nov 2021 03:25), Max: 97.7 (09 Nov 2021 21:32)  HR: 81 (10 Nov 2021 03:25) (71 - 92)  BP: 108/56 (10 Nov 2021 03:25) (108/56 - 131/66)  RR: 48 (10 Nov 2021 03:25) (17 - 50)  SpO2: 97% (10 Nov 2021 03:25) (95% - 100%)    Update: RRT called for hypoxia and tachycardia. Upon arrival of RRT team, patient saturating 96% on 4L and HR 70-80. AM labs and ABG done during RRT. Patient due for Solu-medrol in am. Will reconsult pulmonology in am. STAT CXR ordered. RRT and ABG results d/w hospitalist in charge. Will monitor patient closely.

## 2021-11-10 NOTE — PROVIDER CONTACT NOTE (OTHER) - ACTION/TREATMENT ORDERED:
PA made aware, PA stated will come to assess the patient, no new orders received , continue to monitor.
PA made aware, ECG ordered, PA stated will come to assess the patient, continue to monitor.
PA made aware, no new orders received at this time, HOB 45 degree, continue to monitor.

## 2021-11-10 NOTE — PROGRESS NOTE ADULT - SUBJECTIVE AND OBJECTIVE BOX
[facility] Division of Hospital Medicine  Valentin Larson) MD Marco A  Pager [phone]    SUBJECTIVE:  Chief complaint: SOB.     ID: [id number]  Pt seen and evaluated at bedside this AM. OVernight events are noted. Pt had no recollection of his respiratory distress event. Pt states he feels much better today. He denied any issues w/ PO intake (currently on modified diet). He was looking at a book during my assessment. Pt inquired on his plan of care - I explained that the team is working on placing him to a long term care facility once he's medically stable. Plan is to place him to facility where he can be closer w/ his daughter. He stated that he was in agreement w/ plan.    ROS: All systems negative except as noted.      Vital Signs Last 24 Hrs  T(C): 36.4 (10 Nov 2021 10:14), Max: 36.5 (09 Nov 2021 21:32)  T(F): 97.6 (10 Nov 2021 10:14), Max: 97.7 (09 Nov 2021 21:32)  HR: 98 (10 Nov 2021 10:14) (78 - 98)  BP: 122/79 (10 Nov 2021 10:14) (108/56 - 131/66)  BP(mean): --  RR: 26 (10 Nov 2021 10:14) (17 - 50)  SpO2: 97% (10 Nov 2021 10:14) (97% - 100%)    PHYSICAL EXAM:  Gen- In bed, comfortable, NAD  Eyes- EOMI, PERRLA, nonicteric.  EMNT- Fair dentition. MMM. No tonsilar exudates. No posterior pharynx erythema.  Neck- Supple. No masses. No tracheal deviation.  Resp- CTABL. Good effort. No r/r/w. No accessory muscle use. Nonlabored breathing.  CVS- RRR, S1S2, no g/r/m. No LE edema.  GI- Soft abd, NT, ND, +BSx4. No HSM.  MSK- No C/C. ROM intact. No crepitus.  Neuro- CN II-XII intact. Speech fluent/face symmetric. Sensation intact.  Skin- No rashes/ulcers. Warm/moist.  Psych- Appropriate mood/affect.      MEDICATION:  MEDICATIONS  (STANDING):  BACItracin   Ointment 1 Application(s) Topical two times a day  enoxaparin Injectable 40 milliGRAM(s) SubCutaneous daily  levETIRAcetam 500 milliGRAM(s) Oral two times a day  methylPREDNISolone sodium succinate Injectable 50 milliGRAM(s) IV Push daily  montelukast 10 milliGRAM(s) Oral at bedtime  pantoprazole    Tablet 40 milliGRAM(s) Oral before breakfast    MEDICATIONS  (PRN):  levalbuterol Inhalation 0.63 milliGRAM(s) Inhalation every 6 hours PRN SOB/Wheezing      LABORATORY:                          12.9   10.10 )-----------( 174      ( 10 Nov 2021 05:12 )             39.7     11-10    140  |  100  |  19  ----------------------------<  104<H>  3.8   |  31  |  0.74    Ca    9.1      10 Nov 2021 05:12  Phos  2.5     11-10  Mg     2.20     11-10    TPro  6.6  /  Alb  3.5  /  TBili  0.9  /  DBili  x   /  AST  18  /  ALT  17  /  AlkPhos  60  11-10            ABG - ( 10 Nov 2021 05:12 )  pH, Arterial: 7.46  pH, Blood: x     /  pCO2: 48    /  pO2: 77    / HCO3: 34    / Base Excess: 8.9   /  SaO2: 96.8              SARS-CoV-2: Nadiatec (07 Nov 2021 13:56)            RADIOLOGY:  CXR personally reviewed and interpreted. Comapred to admission CXR - no acute changes appreciated                  [facility] Division of Hospital Medicine  Valentin Larson) MD Marco A  Pager [phone]    SUBJECTIVE:  Chief complaint: SOB.     ID: [id number]  Pt seen and evaluated at bedside this AM. Overnight events are noted. Pt had no recollection of his respiratory distress event. Pt states he feels much better today. He denied any issues w/ PO intake (currently on modified diet). He was looking at a book during my assessment. Pt inquired on his plan of care - I explained that the team is working on placing him to a long term care facility once he's medically stable. Plan is to place him to facility where he can be closer w/ his daughter. He stated that he was in agreement w/ plan.    ROS: All systems negative except as noted.      Vital Signs Last 24 Hrs  T(C): 36.4 (10 Nov 2021 10:14), Max: 36.5 (09 Nov 2021 21:32)  T(F): 97.6 (10 Nov 2021 10:14), Max: 97.7 (09 Nov 2021 21:32)  HR: 98 (10 Nov 2021 10:14) (78 - 98)  BP: 122/79 (10 Nov 2021 10:14) (108/56 - 131/66)  BP(mean): --  RR: 26 (10 Nov 2021 10:14) (17 - 50)  SpO2: 97% (10 Nov 2021 10:14) (97% - 100%)    PHYSICAL EXAM:  Gen- In bed, comfortable, NAD  Eyes- EOMI, PERRLA, nonicteric.  EMNT- Fair dentition. MMM. No tonsilar exudates. No posterior pharynx erythema.  Neck- Supple. No masses. No tracheal deviation.  Resp- CTABL. Good effort. No r/r/w. No accessory muscle use. Nonlabored breathing.  CVS- RRR, S1S2, no g/r/m. No LE edema.  GI- Soft abd, NT, ND, +BSx4. No HSM.  MSK- No C/C. ROM intact. No crepitus.  Neuro- CN II-XII intact. Speech fluent/face symmetric. Sensation intact.  Skin- No rashes/ulcers. Warm/moist.  Psych- Appropriate mood/affect.      MEDICATION:  MEDICATIONS  (STANDING):  BACItracin   Ointment 1 Application(s) Topical two times a day  enoxaparin Injectable 40 milliGRAM(s) SubCutaneous daily  levETIRAcetam 500 milliGRAM(s) Oral two times a day  methylPREDNISolone sodium succinate Injectable 50 milliGRAM(s) IV Push daily  montelukast 10 milliGRAM(s) Oral at bedtime  pantoprazole    Tablet 40 milliGRAM(s) Oral before breakfast    MEDICATIONS  (PRN):  levalbuterol Inhalation 0.63 milliGRAM(s) Inhalation every 6 hours PRN SOB/Wheezing      LABORATORY:                          12.9   10.10 )-----------( 174      ( 10 Nov 2021 05:12 )             39.7     11-10    140  |  100  |  19  ----------------------------<  104<H>  3.8   |  31  |  0.74    Ca    9.1      10 Nov 2021 05:12  Phos  2.5     11-10  Mg     2.20     11-10    TPro  6.6  /  Alb  3.5  /  TBili  0.9  /  DBili  x   /  AST  18  /  ALT  17  /  AlkPhos  60  11-10            ABG - ( 10 Nov 2021 05:12 )  pH, Arterial: 7.46  pH, Blood: x     /  pCO2: 48    /  pO2: 77    / HCO3: 34    / Base Excess: 8.9   /  SaO2: 96.8              SARS-CoV-2: Nadiatec (07 Nov 2021 13:56)            RADIOLOGY:  CXR personally reviewed and interpreted. Comapred to admission CXR - no acute changes appreciated

## 2021-11-10 NOTE — RAPID RESPONSE TEAM SUMMARY - NSOTHERINTERVENTIONSRRT_GEN_ALL_CORE
CBC, CMP, ABG drawn.  Primary team to continue with steroids and nebulizers, plan to follow up with pulmonology consultants in AM.

## 2021-11-10 NOTE — PROGRESS NOTE ADULT - ASSESSMENT
74yo male w/ PMH of COPD (on 4L NC home O2), ?pulm fibrosis, HTN, Depression, BPH, GERD, ?seizures (on Keppra 500mg BID) presents with SOB. Patient left against medical advice from [facility] 2 days ago. On NC 4L at baseline, presented to ED satting 65% on RA after running out of O2. Initially placed on BiPAP. MiICU consulted - deemed to be not a candidate for higher level fo care. Admitted to medicine for respiratory failure 2' pulmonary fibrosis and running out of home O2.

## 2021-11-10 NOTE — PROGRESS NOTE ADULT - TIME BILLING
medical management, discussion with team, patient, documentation.
medical management, conversations with patient/family/providers, documentation.

## 2021-11-10 NOTE — PROVIDER CONTACT NOTE (OTHER) - ASSESSMENT
pt. in no acute distress, awake, alert and oriented x2, supplemental oxygen 4 L NC , IVF , afebrile, /66, denies chest pain and SOB
pt. in no acute distress, awake, alert and oriented x2, 4 L NC supplemental oxygen , IVF
pt.  awake, alert and oriented x2,, RR -48,  using of accessory muscles while breathing, supplemental oxygen 4 L NC , IVF , afebrile, reports chest pain and SOB

## 2021-11-10 NOTE — PROGRESS NOTE ADULT - ATTENDING COMMENTS
pt with ILD, was prescribed OFEV by outpatient pulmonary, had negative CTD serology workup 2020. pt with respiratory exacerbation/progression of ILD seen in 2017 and 2021 CT chest. pt appears to be at baseline O2 requirement. TTE pending. agree with prednisone taper as above; from NH. pt to follow up with Dr. Nesbitt as outpatient. please reconsult as needed.
 services 194511 used. pt reports feeling better today, on baseline O2 NC, not on NIV. Rapid response noted last night but patient appears to be stable now, not in respiratory distress or tachypneic. he has advanced progression of his ILD. went from home to rehab to hospital. has outside pulmonary. slow taper of prednisone as above, continue duonebs. could consider palliative care consult for GOC discussion; pt reports able to ambulate.

## 2021-11-10 NOTE — PROGRESS NOTE ADULT - PROBLEM SELECTOR PLAN 1
-Blood gas parameters have improved. CTA chest with IV contrast negative for PE, likely ILD/fibrosis  Imaging is suggestive of progressive disease.  -Seen by MICU - not a candidate  -Remains stable on 4L by NC - cont to wean as tolerated.  -SLP re-eval appreciated - resumed on modified diet.   -RRT on 11/10 for tachypnea. Stable now.  -Continue methylpred 50 daily, nebs.  -Pulm recs appreciated.

## 2021-11-10 NOTE — PROGRESS NOTE ADULT - PROBLEM SELECTOR PLAN 4
No S/S of urinary retention.  -Await confirmation of meds - can start flomax equivalent.  -Monitor urine output and renal function
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
## 2021-11-10 NOTE — PROGRESS NOTE ADULT - SUBJECTIVE AND OBJECTIVE BOX
CHIEF COMPLAINT:  hypoxic respiratory failure     Interval events:  - pulm re-consulted given rapid response overnight for tachypnea   - patient seen and examined this AM, on 3 L nasal cannula  - patient states his breathing is comfortable and that he is on 3-4 L O2 at home    PAST MEDICAL & SURGICAL HISTORY:  Hypertension    Hypothyroid    BPH (benign prostatic hypertrophy)    COPD with hypoxia    HTN (hypertension)    Benign prostate hyperplasia    GERD (gastroesophageal reflux disease)    Depression, major    No significant past surgical history    No significant past surgical history        FAMILY HISTORY:  No pertinent family history in first degree relatives        SOCIAL HISTORY:  Smoking: [ ] Never Smoked [x ] Former Smoker    Allergies    No Known Allergies    Intolerances      HOME MEDICATIONS:  Home Medications:  alfuzosin extended release 10 mg oral tablet, extended release: 1 tab(s) orally once a day (06 Aug 2015 06:21)  atenolol 25 mg oral tablet: 1 tab(s) orally 2 times a day (Filled via [facility] on 9/11/21 x 1 month) (08 Nov 2021 11:34)  atenolol 50 mg oral tablet: 1 tab(s) orally once a day (06 Aug 2015 06:21)  doxazosin 8 mg oral tablet: 1 tab(s) orally once (at bedtime) (06 Aug 2015 06:21)  escitalopram 10 mg oral tablet: 1 tab(s) orally once a day (Filled via [facility] on 10/7/21 x 1 month) (08 Nov 2021 11:34)  famotidine 20 mg oral tablet: 1 tab(s) orally 2 times a day (Filled via [facility] on 10/7/21 x 1 month) (08 Nov 2021 11:34)  Keppra 500 mg oral tablet: 1 tab(s) orally 2 times a day (Filled via [facility] on 10/7/21 x 1 month) (08 Nov 2021 11:34)  levothyroxine 112 mcg (0.112 mg) oral tablet: 1 tab(s) orally once a day (Filled via [facility] in May/2021 x 3 months) (08 Nov 2021 11:34)  levothyroxine 75 mcg (0.075 mg) oral tablet: 1 tab(s) orally once a day (06 Aug 2015 06:21)  Metoprolol Succinate ER 50 mg oral tablet, extended release: 1 tab(s) orally once a day (06 Aug 2015 06:21)  montelukast 10 mg oral tablet: 1 tab(s) orally once a day (Filled via [facility] on 10/7/21 x 1 month) (08 Nov 2021 11:34)  tamsulosin 0.4 mg oral capsule: 1 cap(s) orally once a day (Filled via [facility] on 8/30/21 x 3 months) (08 Nov 2021 11:34)  Trelegy Ellipta 200 mcg-62.5 mcg-25 mcg/inh inhalation powder: 1 puff(s) inhaled once a day (08 Nov 2021 11:34)  Ventolin HFA 90 mcg/inh inhalation aerosol: 2 puff(s) inhaled every 6 hours, As Needed (Filled via [facility] on 10/7/21 x 1 month) (08 Nov 2021 11:34)      REVIEW OF SYSTEMS:  Constitutional: [ ] negative [ ] fevers [ ] chills [ ] weight loss [ ] weight gain  HEENT: [ ] negative [ ] dry eyes [ ] eye irritation [ ] postnasal drip [ ] nasal congestion  CV: [ ] negative  [ ] chest pain [ ] orthopnea [ ] palpitations [ ] murmur  Resp: [ ] negative [ ] cough [x ] shortness of breath [ ] dyspnea [ ] wheezing [ ] sputum [ ] hemoptysis  GI: [ ] negative [ ] nausea [ ] vomiting [ ] diarrhea [ ] constipation [ ] abd pain [ ] dysphagia   : [ ] negative [ ] dysuria [ ] nocturia [ ] hematuria [ ] increased urinary frequency  Musculoskeletal: [ ] negative [ ] back pain [ ] myalgias [ ] arthralgias [ ] fracture  Skin: [ ] negative [ ] rash [ ] itch  Neurological: [ ] negative [ ] headache [ ] dizziness [ ] syncope [ ] weakness [ ] numbness  Psychiatric: [ ] negative [ ] anxiety [ ] depression  Endocrine: [ ] negative [ ] diabetes [ ] thyroid problem  Hematologic/Lymphatic: [ ] negative [ ] anemia [ ] bleeding problem  Allergic/Immunologic: [ ] negative [ ] itchy eyes [ ] nasal discharge [ ] hives [ ] angioedema  [ ] All other systems negative  [ ] Unable to assess ROS because ________    OBJECTIVE:  ICU Vital Signs Last 24 Hrs  T(C): 36.9 (08 Nov 2021 17:27), Max: 36.9 (07 Nov 2021 19:00)  T(F): 98.5 (08 Nov 2021 17:27), Max: 98.5 (08 Nov 2021 17:27)  HR: 106 (08 Nov 2021 17:27) (84 - 120)  BP: 118/85 (08 Nov 2021 17:27) (109/87 - 143/72)  BP(mean): --  ABP: --  ABP(mean): --  RR: 17 (08 Nov 2021 17:27) (17 - 60)  SpO2: 100% (08 Nov 2021 17:27) (97% - 100%)        11-08 @ 07:01  -  11-08 @ 17:54  --------------------------------------------------------  IN: 0 mL / OUT: 450 mL / NET: -450 mL      CAPILLARY BLOOD GLUCOSE          PHYSICAL EXAM:  General:  no acute distress, thin,   HEENT: atraumatic, normocephalic, poor dentition  Respiratory:  mild crackles bilaterally but otherwise no significant wheezing, no use of accessory muscles of respiration   Cardiovascular:  RRR, no rubs, murmurs, gallops   Abdomen: thin, non-distended, non-tedner   Extremities: thin, no edema   Skin: no rashes, cyanosis  Neurological: no gross/focal deficits appreciated       LINES:     HOSPITAL MEDICATIONS:  Standing Meds:  enoxaparin Injectable 40 milliGRAM(s) SubCutaneous daily  lactated ringers. 500 milliLiter(s) IV Continuous <Continuous>  levETIRAcetam  IVPB 500 milliGRAM(s) IV Intermittent every 12 hours  methylPREDNISolone sodium succinate Injectable 40 milliGRAM(s) IV Push daily  pantoprazole  Injectable 40 milliGRAM(s) IV Push daily      PRN Meds:  levalbuterol Inhalation 0.63 milliGRAM(s) Inhalation every 6 hours PRN  metoprolol tartrate Injectable 5 milliGRAM(s) IV Push every 6 hours PRN      LABS:                        14.3   10.44 )-----------( 194      ( 08 Nov 2021 06:34 )             44.4     Hgb Trend: 14.3<--, 14.1<--  11-08    141  |  98  |  21  ----------------------------<  111<H>  4.2   |  27  |  0.81    Ca    9.5      08 Nov 2021 06:34  Phos  4.5     11-08  Mg     2.00     11-08    TPro  7.7  /  Alb  3.9  /  TBili  0.6  /  DBili  x   /  AST  16  /  ALT  11  /  AlkPhos  77  11-08    Creatinine Trend: 0.81<--, 0.78<--      Arterial Blood Gas:  11-07 @ 14:41  7.45/45/96/31/98.1/6.4  ABG lactate: --    Venous Blood Gas:  11-08 @ 06:34  7.39/53/116/32/98.1  VBG Lactate: 1.8  Venous Blood Gas:  11-07 @ 21:24  7.38/53/43/31/75.1  VBG Lactate: 3.0  Venous Blood Gas:  11-07 @ 13:24  7.38/60/23/36/36.2  VBG Lactate: 1.8      MICROBIOLOGY:       RADIOLOGY:  [ ] Reviewed and interpreted by me    PULMONARY FUNCTION TESTS:    EKG:   CHIEF COMPLAINT:  hypoxic respiratory failure     Interval events:  - pulm re-consulted given rapid response overnight for tachypnea   - patient seen and examined this AM, on 3 L nasal cannula  - patient states that currently his breathing is comfortable but he is experiencing palpitations    PAST MEDICAL & SURGICAL HISTORY:  Hypertension    Hypothyroid    BPH (benign prostatic hypertrophy)    COPD with hypoxia    HTN (hypertension)    Benign prostate hyperplasia    GERD (gastroesophageal reflux disease)    Depression, major    No significant past surgical history    No significant past surgical history        FAMILY HISTORY:  No pertinent family history in first degree relatives        SOCIAL HISTORY:  Smoking: [ ] Never Smoked [x ] Former Smoker    Allergies    No Known Allergies    Intolerances      HOME MEDICATIONS:  Home Medications:  alfuzosin extended release 10 mg oral tablet, extended release: 1 tab(s) orally once a day (06 Aug 2015 06:21)  atenolol 25 mg oral tablet: 1 tab(s) orally 2 times a day (Filled via [facility] on 9/11/21 x 1 month) (08 Nov 2021 11:34)  atenolol 50 mg oral tablet: 1 tab(s) orally once a day (06 Aug 2015 06:21)  doxazosin 8 mg oral tablet: 1 tab(s) orally once (at bedtime) (06 Aug 2015 06:21)  escitalopram 10 mg oral tablet: 1 tab(s) orally once a day (Filled via [facility] on 10/7/21 x 1 month) (08 Nov 2021 11:34)  famotidine 20 mg oral tablet: 1 tab(s) orally 2 times a day (Filled via [facility] on 10/7/21 x 1 month) (08 Nov 2021 11:34)  Keppra 500 mg oral tablet: 1 tab(s) orally 2 times a day (Filled via [facility] on 10/7/21 x 1 month) (08 Nov 2021 11:34)  levothyroxine 112 mcg (0.112 mg) oral tablet: 1 tab(s) orally once a day (Filled via [facility] in May/2021 x 3 months) (08 Nov 2021 11:34)  levothyroxine 75 mcg (0.075 mg) oral tablet: 1 tab(s) orally once a day (06 Aug 2015 06:21)  Metoprolol Succinate ER 50 mg oral tablet, extended release: 1 tab(s) orally once a day (06 Aug 2015 06:21)  montelukast 10 mg oral tablet: 1 tab(s) orally once a day (Filled via [facility] on 10/7/21 x 1 month) (08 Nov 2021 11:34)  tamsulosin 0.4 mg oral capsule: 1 cap(s) orally once a day (Filled via [facility] on 8/30/21 x 3 months) (08 Nov 2021 11:34)  Trelegy Ellipta 200 mcg-62.5 mcg-25 mcg/inh inhalation powder: 1 puff(s) inhaled once a day (08 Nov 2021 11:34)  Ventolin HFA 90 mcg/inh inhalation aerosol: 2 puff(s) inhaled every 6 hours, As Needed (Filled via [facility] on 10/7/21 x 1 month) (08 Nov 2021 11:34)      REVIEW OF SYSTEMS:  Constitutional: [ ] negative [ ] fevers [ ] chills [ ] weight loss [ ] weight gain  HEENT: [ ] negative [ ] dry eyes [ ] eye irritation [ ] postnasal drip [ ] nasal congestion  CV: [ ] negative  [ ] chest pain [ ] orthopnea [ ] palpitations [ ] murmur  Resp: [ ] negative [ ] cough [x ] shortness of breath [ ] dyspnea [ ] wheezing [ ] sputum [ ] hemoptysis  GI: [ ] negative [ ] nausea [ ] vomiting [ ] diarrhea [ ] constipation [ ] abd pain [ ] dysphagia   : [ ] negative [ ] dysuria [ ] nocturia [ ] hematuria [ ] increased urinary frequency  Musculoskeletal: [ ] negative [ ] back pain [ ] myalgias [ ] arthralgias [ ] fracture  Skin: [ ] negative [ ] rash [ ] itch  Neurological: [ ] negative [ ] headache [ ] dizziness [ ] syncope [ ] weakness [ ] numbness  Psychiatric: [ ] negative [ ] anxiety [ ] depression  Endocrine: [ ] negative [ ] diabetes [ ] thyroid problem  Hematologic/Lymphatic: [ ] negative [ ] anemia [ ] bleeding problem  Allergic/Immunologic: [ ] negative [ ] itchy eyes [ ] nasal discharge [ ] hives [ ] angioedema  [ ] All other systems negative  [ ] Unable to assess ROS because ________    OBJECTIVE:  ICU Vital Signs Last 24 Hrs  T(C): 36.9 (08 Nov 2021 17:27), Max: 36.9 (07 Nov 2021 19:00)  T(F): 98.5 (08 Nov 2021 17:27), Max: 98.5 (08 Nov 2021 17:27)  HR: 106 (08 Nov 2021 17:27) (84 - 120)  BP: 118/85 (08 Nov 2021 17:27) (109/87 - 143/72)  BP(mean): --  ABP: --  ABP(mean): --  RR: 17 (08 Nov 2021 17:27) (17 - 60)  SpO2: 100% (08 Nov 2021 17:27) (97% - 100%)        11-08 @ 07:01  -  11-08 @ 17:54  --------------------------------------------------------  IN: 0 mL / OUT: 450 mL / NET: -450 mL      CAPILLARY BLOOD GLUCOSE          PHYSICAL EXAM:  General:  no acute distress, thin,   HEENT: atraumatic, normocephalic, poor dentition  Respiratory:  mild crackles bilaterally but otherwise no significant wheezing, no use of accessory muscles of respiration   Cardiovascular:  RRR, no rubs, murmurs, gallops   Abdomen: thin, non-distended, non-tedner   Extremities: thin, no edema   Skin: no rashes, cyanosis  Neurological: no gross/focal deficits appreciated       LINES:     HOSPITAL MEDICATIONS:  Standing Meds:  enoxaparin Injectable 40 milliGRAM(s) SubCutaneous daily  lactated ringers. 500 milliLiter(s) IV Continuous <Continuous>  levETIRAcetam  IVPB 500 milliGRAM(s) IV Intermittent every 12 hours  methylPREDNISolone sodium succinate Injectable 40 milliGRAM(s) IV Push daily  pantoprazole  Injectable 40 milliGRAM(s) IV Push daily      PRN Meds:  levalbuterol Inhalation 0.63 milliGRAM(s) Inhalation every 6 hours PRN  metoprolol tartrate Injectable 5 milliGRAM(s) IV Push every 6 hours PRN      LABS:                        14.3   10.44 )-----------( 194      ( 08 Nov 2021 06:34 )             44.4     Hgb Trend: 14.3<--, 14.1<--  11-08    141  |  98  |  21  ----------------------------<  111<H>  4.2   |  27  |  0.81    Ca    9.5      08 Nov 2021 06:34  Phos  4.5     11-08  Mg     2.00     11-08    TPro  7.7  /  Alb  3.9  /  TBili  0.6  /  DBili  x   /  AST  16  /  ALT  11  /  AlkPhos  77  11-08    Creatinine Trend: 0.81<--, 0.78<--      Arterial Blood Gas:  11-07 @ 14:41  7.45/45/96/31/98.1/6.4  ABG lactate: --    Venous Blood Gas:  11-08 @ 06:34  7.39/53/116/32/98.1  VBG Lactate: 1.8  Venous Blood Gas:  11-07 @ 21:24  7.38/53/43/31/75.1  VBG Lactate: 3.0  Venous Blood Gas:  11-07 @ 13:24  7.38/60/23/36/36.2  VBG Lactate: 1.8      MICROBIOLOGY:       RADIOLOGY:  [ ] Reviewed and interpreted by me    PULMONARY FUNCTION TESTS:    EKG:

## 2021-11-10 NOTE — PROVIDER CONTACT NOTE (OTHER) - REASON
pt. becoming tachypneic
pt. awake, alert and oriented x2
pt. becoming tachypneic again , report chest pain,

## 2021-11-10 NOTE — PROVIDER CONTACT NOTE (OTHER) - BACKGROUND
pt. admitted from ED for hypoxia
pt. admitted from ED for hypoxia
pt. admitted to ER for hypoxia and SOB

## 2021-11-10 NOTE — PROVIDER CONTACT NOTE (CHANGE IN STATUS NOTIFICATION) - ASSESSMENT
pt. awake, alert and oriented x2, supplemental oxygen 4 L NC , tachypneic, purse lip breathing, using of accessory muscles while breathing, pt. is  shaking, v/s ( see RRT record)

## 2021-11-10 NOTE — RAPID RESPONSE TEAM SUMMARY - NSSITUATIONBACKGROUNDRRT_GEN_ALL_CORE
76yo male w/ PMH of COPD (on 4L NC home O2), ?pulm fibrosis, HTN, Depression, BPH, GERD, ?seizures (on Keppra 500mg BID) presents with SOB. Patient left against medical advice from [facility] 2 days ago. On NC 4L at baseline, presented to ED satting 65% on RA after running out of O2. Initially placed on BiPAP. MICU consulted - deemed to be not a candidate for higher level for care. Admitted to medicine for respiratory failure 2' pulmonary fibrosis and running out of home O2.    Patient has been intermittently tachypneic on his home O2 of 4L NC, and maintaining SpO2 in low to mid 90s.   Earlier in the evening, he was evaluated for tachypnea. Noted to have rapid shallow breathing, ECG was unchanged from prior. Was given Xopenex nebulizer treatment. Patient is currently receiving steroids for ILD/COPD.    RRT called roughly 1 hour later for tachypnea and fine shaking of limbs. Patient alert and oriented, endorses shortness of breath when asked in [place].   Noted seizure history, however shaking does not appear to be seizure-like and patient is able to move limbs on his own.    Vital signs on arrival 120s/60s, SpO2 95%, HR 80s, Temp 98.2 rectally. .

## 2021-11-10 NOTE — RAPID RESPONSE TEAM SUMMARY - NSADDTLFINDINGSRRT_GEN_ALL_CORE
Physical exam:  Cachectic man, Tachypneic to 30s, Lungs clear to auscultation bilaterally, cardiac exam normal. Not grossly volume overloaded.  Per primary team, patient appears stable as compared to prior in admission.

## 2021-11-10 NOTE — PROGRESS NOTE ADULT - PROBLEM SELECTOR PLAN 3
Pt on Atenolol 25 mg q12hrs outpatient. BPs in acceptable range overnight. HR improved.  -Resume atenolol if necessary. For now, can continue to hold.

## 2021-11-11 LAB
ANION GAP SERPL CALC-SCNC: 8 MMOL/L — SIGNIFICANT CHANGE UP (ref 7–14)
BUN SERPL-MCNC: 15 MG/DL — SIGNIFICANT CHANGE UP (ref 7–23)
CALCIUM SERPL-MCNC: 9.2 MG/DL — SIGNIFICANT CHANGE UP (ref 8.4–10.5)
CHLORIDE SERPL-SCNC: 101 MMOL/L — SIGNIFICANT CHANGE UP (ref 98–107)
CO2 SERPL-SCNC: 32 MMOL/L — HIGH (ref 22–31)
CREAT SERPL-MCNC: 0.72 MG/DL — SIGNIFICANT CHANGE UP (ref 0.5–1.3)
GLUCOSE SERPL-MCNC: 102 MG/DL — HIGH (ref 70–99)
HCT VFR BLD CALC: 41.8 % — SIGNIFICANT CHANGE UP (ref 39–50)
HGB BLD-MCNC: 13.7 G/DL — SIGNIFICANT CHANGE UP (ref 13–17)
MAGNESIUM SERPL-MCNC: 2.1 MG/DL — SIGNIFICANT CHANGE UP (ref 1.6–2.6)
MCHC RBC-ENTMCNC: 29.9 PG — SIGNIFICANT CHANGE UP (ref 27–34)
MCHC RBC-ENTMCNC: 32.8 GM/DL — SIGNIFICANT CHANGE UP (ref 32–36)
MCV RBC AUTO: 91.3 FL — SIGNIFICANT CHANGE UP (ref 80–100)
NRBC # BLD: 0 /100 WBCS — SIGNIFICANT CHANGE UP
NRBC # FLD: 0 K/UL — SIGNIFICANT CHANGE UP
PHOSPHATE SERPL-MCNC: 3.2 MG/DL — SIGNIFICANT CHANGE UP (ref 2.5–4.5)
PLATELET # BLD AUTO: 171 K/UL — SIGNIFICANT CHANGE UP (ref 150–400)
POTASSIUM SERPL-MCNC: 3.7 MMOL/L — SIGNIFICANT CHANGE UP (ref 3.5–5.3)
POTASSIUM SERPL-SCNC: 3.7 MMOL/L — SIGNIFICANT CHANGE UP (ref 3.5–5.3)
RBC # BLD: 4.58 M/UL — SIGNIFICANT CHANGE UP (ref 4.2–5.8)
RBC # FLD: 12.3 % — SIGNIFICANT CHANGE UP (ref 10.3–14.5)
SODIUM SERPL-SCNC: 141 MMOL/L — SIGNIFICANT CHANGE UP (ref 135–145)
WBC # BLD: 8.23 K/UL — SIGNIFICANT CHANGE UP (ref 3.8–10.5)
WBC # FLD AUTO: 8.23 K/UL — SIGNIFICANT CHANGE UP (ref 3.8–10.5)

## 2021-11-11 PROCEDURE — 99232 SBSQ HOSP IP/OBS MODERATE 35: CPT

## 2021-11-11 RX ORDER — TAMSULOSIN HYDROCHLORIDE 0.4 MG/1
0.4 CAPSULE ORAL AT BEDTIME
Refills: 0 | Status: DISCONTINUED | OUTPATIENT
Start: 2021-11-11 | End: 2021-11-15

## 2021-11-11 RX ORDER — ESCITALOPRAM OXALATE 10 MG/1
10 TABLET, FILM COATED ORAL DAILY
Refills: 0 | Status: DISCONTINUED | OUTPATIENT
Start: 2021-11-11 | End: 2021-11-15

## 2021-11-11 RX ORDER — TIOTROPIUM BROMIDE 18 UG/1
1 CAPSULE ORAL; RESPIRATORY (INHALATION) DAILY
Refills: 0 | Status: DISCONTINUED | OUTPATIENT
Start: 2021-11-11 | End: 2021-11-15

## 2021-11-11 RX ORDER — BUDESONIDE AND FORMOTEROL FUMARATE DIHYDRATE 160; 4.5 UG/1; UG/1
2 AEROSOL RESPIRATORY (INHALATION)
Refills: 0 | Status: DISCONTINUED | OUTPATIENT
Start: 2021-11-11 | End: 2021-11-15

## 2021-11-11 RX ORDER — LEVOTHYROXINE SODIUM 125 MCG
1 TABLET ORAL
Qty: 0 | Refills: 0 | DISCHARGE

## 2021-11-11 RX ORDER — ENOXAPARIN SODIUM 100 MG/ML
40 INJECTION SUBCUTANEOUS DAILY
Refills: 0 | Status: DISCONTINUED | OUTPATIENT
Start: 2021-11-12 | End: 2021-11-15

## 2021-11-11 RX ADMIN — ENOXAPARIN SODIUM 40 MILLIGRAM(S): 100 INJECTION SUBCUTANEOUS at 11:55

## 2021-11-11 RX ADMIN — LEVETIRACETAM 500 MILLIGRAM(S): 250 TABLET, FILM COATED ORAL at 11:55

## 2021-11-11 RX ADMIN — TAMSULOSIN HYDROCHLORIDE 0.4 MILLIGRAM(S): 0.4 CAPSULE ORAL at 21:32

## 2021-11-11 RX ADMIN — MONTELUKAST 10 MILLIGRAM(S): 4 TABLET, CHEWABLE ORAL at 21:32

## 2021-11-11 RX ADMIN — BUDESONIDE AND FORMOTEROL FUMARATE DIHYDRATE 2 PUFF(S): 160; 4.5 AEROSOL RESPIRATORY (INHALATION) at 14:19

## 2021-11-11 RX ADMIN — PANTOPRAZOLE SODIUM 40 MILLIGRAM(S): 20 TABLET, DELAYED RELEASE ORAL at 06:03

## 2021-11-11 RX ADMIN — BUDESONIDE AND FORMOTEROL FUMARATE DIHYDRATE 2 PUFF(S): 160; 4.5 AEROSOL RESPIRATORY (INHALATION) at 23:10

## 2021-11-11 RX ADMIN — Medication 1 APPLICATION(S): at 06:03

## 2021-11-11 RX ADMIN — Medication 50 MILLIGRAM(S): at 06:03

## 2021-11-11 RX ADMIN — ESCITALOPRAM OXALATE 10 MILLIGRAM(S): 10 TABLET, FILM COATED ORAL at 11:54

## 2021-11-11 RX ADMIN — Medication 1 APPLICATION(S): at 18:18

## 2021-11-11 RX ADMIN — LEVETIRACETAM 500 MILLIGRAM(S): 250 TABLET, FILM COATED ORAL at 21:32

## 2021-11-11 RX ADMIN — TIOTROPIUM BROMIDE 1 CAPSULE(S): 18 CAPSULE ORAL; RESPIRATORY (INHALATION) at 18:19

## 2021-11-11 RX ADMIN — Medication 1 TABLET(S): at 11:54

## 2021-11-11 NOTE — PROGRESS NOTE ADULT - PROBLEM SELECTOR PLAN 1
-Blood gas parameters have improved. CTA chest with IV contrast negative for PE, likely ILD/fibrosis  Imaging is suggestive of progressive disease.  -Seen by MICU - not a candidate. SLP re-eval appreciated - resumed on modified diet.   -RRT on 11/10 for tachypnea. Stable now.  -Significant improvement clinically -- DC methylpred, start prednisone 40 and taper as tolerated.  -resume pt's home inhaler (therapeutic equivalents)  -Xopenex PRN.  -Pulm recs appreciated.

## 2021-11-11 NOTE — PROGRESS NOTE ADULT - ASSESSMENT
76yo male w/ PMH of COPD (on 4L NC home O2), ?pulm fibrosis, HTN, Depression, BPH, GERD, ?seizures (on Keppra 500mg BID) presents with SOB. Patient left against medical advice from [facility] 2 days ago. On NC 4L at baseline, presented to ED satting 65% on RA after running out of O2. Initially placed on BiPAP. MiICU consulted - deemed to be not a candidate for higher level fo care. Admitted to medicine for respiratory failure 2' pulmonary fibrosis and running out of home O2.

## 2021-11-11 NOTE — PROGRESS NOTE ADULT - PROBLEM SELECTOR PLAN 8
-DVT Proph: Lovenox   GI: Protonix.    Communication  -Discussed w/ SW -- Safia found the HCP paper work. Awaiting receipt. Dispo planning once received. At this point - pt is medically stable.

## 2021-11-11 NOTE — PROGRESS NOTE ADULT - PROBLEM SELECTOR PLAN 3
Pt on Atenolol 25 mg daily outpatient. BPs in acceptable range overnight. HR improved.  -Resume atenolol if necessary. For now, can continue to hold.

## 2021-11-11 NOTE — PROGRESS NOTE ADULT - SUBJECTIVE AND OBJECTIVE BOX
[facility] Division of Hospital Medicine  Valentin LopezCasron) MD Marco A  Pager [phone]    SUBJECTIVE:  Chief complaint: SOB.     ID: [id number]  Pt seen and evaluated at bedside this AM. No overnight events. Pt feels a lot better. Denies any SOB. In good spirits. still on board w/ placement with LTC.    ROS: All systems negative except as noted.      Vital Signs Last 24 Hrs  T(C): 36.4 (11 Nov 2021 05:48), Max: 36.8 (10 Nov 2021 21:22)  T(F): 97.5 (11 Nov 2021 05:48), Max: 98.2 (10 Nov 2021 21:22)  HR: 80 (11 Nov 2021 05:48) (80 - 113)  BP: 102/64 (11 Nov 2021 05:48) (102/64 - 138/85)  BP(mean): --  RR: 20 (11 Nov 2021 05:48) (20 - 22)  SpO2: 100% (11 Nov 2021 05:48) (97% - 100%)    PHYSICAL EXAM:  Gen- In bed, comfortable, NAD  Eyes- EOMI, PERRLA, nonicteric.  EMNT- Fair dentition. MMM. No tonsilar exudates. No posterior pharynx erythema.  Neck- Supple. No masses. No tracheal deviation.  Resp- CTABL. Good effort. No r/r/w. No accessory muscle use. Nonlabored breathing.  CVS- RRR, S1S2, no g/r/m. No LE edema.  GI- Soft abd, NT, ND, +BSx4. No HSM.  MSK- No C/C. ROM intact. No crepitus.  Psych- Appropriate mood/affect.      MEDICATION:  MEDICATIONS  (STANDING):  BACItracin   Ointment 1 Application(s) Topical two times a day  budesonide  80 MICROgram(s)/formoterol 4.5 MICROgram(s) Inhaler 2 Puff(s) Inhalation two times a day  escitalopram 10 milliGRAM(s) Oral daily  levETIRAcetam 500 milliGRAM(s) Oral two times a day  methylPREDNISolone sodium succinate Injectable 50 milliGRAM(s) IV Push daily  montelukast 10 milliGRAM(s) Oral at bedtime  multivitamin 1 Tablet(s) Oral daily  pantoprazole    Tablet 40 milliGRAM(s) Oral before breakfast  tamsulosin 0.4 milliGRAM(s) Oral at bedtime  tiotropium 18 MICROgram(s) Capsule 1 Capsule(s) Inhalation daily    MEDICATIONS  (PRN):  levalbuterol Inhalation 0.63 milliGRAM(s) Inhalation every 6 hours PRN SOB/Wheezing      LABORATORY:                          13.7   8.23  )-----------( 171      ( 11 Nov 2021 06:39 )             41.8     11-11    141  |  101  |  15  ----------------------------<  102<H>  3.7   |  32<H>  |  0.72    Ca    9.2      11 Nov 2021 06:39  Phos  3.2     11-11  Mg     2.10     11-11    TPro  6.6  /  Alb  3.5  /  TBili  0.9  /  DBili  x   /  AST  18  /  ALT  17  /  AlkPhos  60  11-10      ABG - ( 10 Nov 2021 05:12 )  pH, Arterial: 7.46  pH, Blood: x     /  pCO2: 48    /  pO2: 77    / HCO3: 34    / Base Excess: 8.9   /  SaO2: 96.8              SARS-CoV-2: Chris (07 Nov 2021 13:56)

## 2021-11-12 ENCOUNTER — TRANSCRIPTION ENCOUNTER (OUTPATIENT)
Age: 76
End: 2021-11-12

## 2021-11-12 LAB
ANION GAP SERPL CALC-SCNC: 11 MMOL/L — SIGNIFICANT CHANGE UP (ref 7–14)
ANTI-RIBONUCLEAR PROTEIN: <0.2 AI — SIGNIFICANT CHANGE UP
BUN SERPL-MCNC: 21 MG/DL — SIGNIFICANT CHANGE UP (ref 7–23)
CALCIUM SERPL-MCNC: 8.8 MG/DL — SIGNIFICANT CHANGE UP (ref 8.4–10.5)
CENTROMERE AB SER-ACNC: <0.2 AI — SIGNIFICANT CHANGE UP
CHLORIDE SERPL-SCNC: 101 MMOL/L — SIGNIFICANT CHANGE UP (ref 98–107)
CK SERPL-CCNC: 29 U/L — LOW (ref 30–200)
CO2 SERPL-SCNC: 32 MMOL/L — HIGH (ref 22–31)
CREAT SERPL-MCNC: 0.78 MG/DL — SIGNIFICANT CHANGE UP (ref 0.5–1.3)
CRP SERPL-MCNC: <4 MG/L — SIGNIFICANT CHANGE UP
ENA JO1 AB SER-ACNC: <0.2 AI — SIGNIFICANT CHANGE UP
ENA SCL70 AB SER-ACNC: <0.2 AI — SIGNIFICANT CHANGE UP
ENA SM AB FLD QL: <0.2 AI — SIGNIFICANT CHANGE UP
ERYTHROCYTE [SEDIMENTATION RATE] IN BLOOD: 23 MM/HR — HIGH (ref 1–15)
GLUCOSE SERPL-MCNC: 95 MG/DL — SIGNIFICANT CHANGE UP (ref 70–99)
HCT VFR BLD CALC: 38.7 % — LOW (ref 39–50)
HGB BLD-MCNC: 12.7 G/DL — LOW (ref 13–17)
MAGNESIUM SERPL-MCNC: 2.1 MG/DL — SIGNIFICANT CHANGE UP (ref 1.6–2.6)
MCHC RBC-ENTMCNC: 30 PG — SIGNIFICANT CHANGE UP (ref 27–34)
MCHC RBC-ENTMCNC: 32.8 GM/DL — SIGNIFICANT CHANGE UP (ref 32–36)
MCV RBC AUTO: 91.5 FL — SIGNIFICANT CHANGE UP (ref 80–100)
NRBC # BLD: 0 /100 WBCS — SIGNIFICANT CHANGE UP
NRBC # FLD: 0 K/UL — SIGNIFICANT CHANGE UP
PHOSPHATE SERPL-MCNC: 3.4 MG/DL — SIGNIFICANT CHANGE UP (ref 2.5–4.5)
PLATELET # BLD AUTO: 146 K/UL — LOW (ref 150–400)
POTASSIUM SERPL-MCNC: 3.7 MMOL/L — SIGNIFICANT CHANGE UP (ref 3.5–5.3)
POTASSIUM SERPL-SCNC: 3.7 MMOL/L — SIGNIFICANT CHANGE UP (ref 3.5–5.3)
RBC # BLD: 4.23 M/UL — SIGNIFICANT CHANGE UP (ref 4.2–5.8)
RBC # FLD: 12.3 % — SIGNIFICANT CHANGE UP (ref 10.3–14.5)
RHEUMATOID FACT SERPL-ACNC: <10 IU/ML — SIGNIFICANT CHANGE UP (ref 0–13)
SODIUM SERPL-SCNC: 144 MMOL/L — SIGNIFICANT CHANGE UP (ref 135–145)
WBC # BLD: 7.59 K/UL — SIGNIFICANT CHANGE UP (ref 3.8–10.5)
WBC # FLD AUTO: 7.59 K/UL — SIGNIFICANT CHANGE UP (ref 3.8–10.5)

## 2021-11-12 PROCEDURE — 99232 SBSQ HOSP IP/OBS MODERATE 35: CPT

## 2021-11-12 RX ADMIN — Medication 1 APPLICATION(S): at 05:57

## 2021-11-12 RX ADMIN — Medication 1 TABLET(S): at 11:29

## 2021-11-12 RX ADMIN — MONTELUKAST 10 MILLIGRAM(S): 4 TABLET, CHEWABLE ORAL at 22:05

## 2021-11-12 RX ADMIN — LEVETIRACETAM 500 MILLIGRAM(S): 250 TABLET, FILM COATED ORAL at 11:29

## 2021-11-12 RX ADMIN — Medication 1 APPLICATION(S): at 17:38

## 2021-11-12 RX ADMIN — BUDESONIDE AND FORMOTEROL FUMARATE DIHYDRATE 2 PUFF(S): 160; 4.5 AEROSOL RESPIRATORY (INHALATION) at 22:01

## 2021-11-12 RX ADMIN — ESCITALOPRAM OXALATE 10 MILLIGRAM(S): 10 TABLET, FILM COATED ORAL at 17:38

## 2021-11-12 RX ADMIN — Medication 40 MILLIGRAM(S): at 05:58

## 2021-11-12 RX ADMIN — BUDESONIDE AND FORMOTEROL FUMARATE DIHYDRATE 2 PUFF(S): 160; 4.5 AEROSOL RESPIRATORY (INHALATION) at 11:28

## 2021-11-12 RX ADMIN — PANTOPRAZOLE SODIUM 40 MILLIGRAM(S): 20 TABLET, DELAYED RELEASE ORAL at 05:57

## 2021-11-12 RX ADMIN — ENOXAPARIN SODIUM 40 MILLIGRAM(S): 100 INJECTION SUBCUTANEOUS at 11:29

## 2021-11-12 RX ADMIN — TIOTROPIUM BROMIDE 1 CAPSULE(S): 18 CAPSULE ORAL; RESPIRATORY (INHALATION) at 11:32

## 2021-11-12 RX ADMIN — TAMSULOSIN HYDROCHLORIDE 0.4 MILLIGRAM(S): 0.4 CAPSULE ORAL at 22:05

## 2021-11-12 NOTE — DISCHARGE NOTE PROVIDER - NSDCMRMEDTOKEN_GEN_ALL_CORE_FT
atenolol 25 mg oral tablet: 1 tab(s) orally 2 times a day (Filled via [facility] on 9/11/21 x 1 month)  escitalopram 10 mg oral tablet: 1 tab(s) orally once a day (Filled via [facility] on 10/7/21 x 1 month)  famotidine 20 mg oral tablet: 1 tab(s) orally 2 times a day (Filled via [facility] on 10/7/21 x 1 month)  Keppra 500 mg oral tablet: 1 tab(s) orally 2 times a day (Filled via [facility] on 10/7/21 x 1 month)  montelukast 10 mg oral tablet: 1 tab(s) orally once a day (Filled via [facility] on 10/7/21 x 1 month)  tamsulosin 0.4 mg oral capsule: 1 cap(s) orally once a day (Filled via [facility] on 8/30/21 x 3 months)  Trelegy Ellipta 200 mcg-62.5 mcg-25 mcg/inh inhalation powder: 1 puff(s) inhaled once a day  Ventolin HFA 90 mcg/inh inhalation aerosol: 2 puff(s) inhaled every 6 hours, As Needed (Filled via [facility] on 10/7/21 x 1 month)   escitalopram 10 mg oral tablet: 1 tab(s) orally once a day  famotidine 20 mg oral tablet: 1 tab(s) orally 2 times a day   Keppra 500 mg oral tablet: 1 tab(s) orally 2 times a day   montelukast 10 mg oral tablet: 1 tab(s) orally once a day   Multiple Vitamins oral tablet: 1 tab(s) orally once a day  pantoprazole 40 mg oral delayed release tablet: 1 tab(s) orally once a day (before a meal)  predniSONE: Starting 11/16/21:   Prednisone 30mg x4 days, then   Prednisone 20mg x4 days, then  Prednisone 10mg x4 days, then   5mg x4 days, then stop   tamsulosin 0.4 mg oral capsule: 1 cap(s) orally once a day   Trelegy Ellipta 200 mcg-62.5 mcg-25 mcg/inh inhalation powder: 1 puff(s) inhaled once a day  Ventolin HFA 90 mcg/inh inhalation aerosol: 2 puff(s) inhaled every 6 hours, As Needed (Filled via [facility] on 10/7/21 x 1 month)   escitalopram 10 mg oral tablet: 1 tab(s) orally once a day  famotidine 20 mg oral tablet: 1 tab(s) orally 2 times a day   Keppra 500 mg oral tablet: 1 tab(s) orally 2 times a day   montelukast 10 mg oral tablet: 1 tab(s) orally once a day   Multiple Vitamins oral tablet: 1 tab(s) orally once a day  predniSONE: Starting 11/16/21:   Prednisone 30mg x4 days, then   Prednisone 20mg x4 days, then  Prednisone 10mg x4 days, then   5mg x4 days, then stop   tamsulosin 0.4 mg oral capsule: 1 cap(s) orally once a day   Trelegy Ellipta 200 mcg-62.5 mcg-25 mcg/inh inhalation powder: 1 puff(s) inhaled once a day  Ventolin HFA 90 mcg/inh inhalation aerosol: 2 puff(s) inhaled every 6 hours, As Needed (Filled via [facility] on 10/7/21 x 1 month)   atenolol 25 mg oral tablet: 1 tab(s) orally 2 times a day   escitalopram 10 mg oral tablet: 1 tab(s) orally once a day  famotidine 20 mg oral tablet: 1 tab(s) orally 2 times a day   Keppra 500 mg oral tablet: 1 tab(s) orally 2 times a day   montelukast 10 mg oral tablet: 1 tab(s) orally once a day   Multiple Vitamins oral tablet: 1 tab(s) orally once a day  predniSONE: Starting 11/16/21:   Prednisone 30mg x4 days, then   Prednisone 20mg x4 days, then  Prednisone 10mg x4 days, then   5mg x4 days, then stop   tamsulosin 0.4 mg oral capsule: 1 cap(s) orally once a day   Trelegy Ellipta 200 mcg-62.5 mcg-25 mcg/inh inhalation powder: 1 puff(s) inhaled once a day  Ventolin HFA 90 mcg/inh inhalation aerosol: 2 puff(s) inhaled every 6 hours, As Needed

## 2021-11-12 NOTE — DISCHARGE NOTE PROVIDER - NSDCCPCAREPLAN_GEN_ALL_CORE_FT
PRINCIPAL DISCHARGE DIAGNOSIS  Diagnosis: Shortness of breath  Assessment and Plan of Treatment:       SECONDARY DISCHARGE DIAGNOSES  Diagnosis: Depression, major  Assessment and Plan of Treatment: Please follow up with your primary care and psychiatrist within in 1-2 weeks for further medical management.  Call your psychiatrist immediately if you feel suicidal or if you need to talk to someone.  Exercise 30 minutes daily as tolerated.      Diagnosis: BPH without urinary obstruction  Assessment and Plan of Treatment: Continue your medications as directed and please follow-up as an outpatient with your primary care provider for further care and recommendations.      Diagnosis: GERD (gastroesophageal reflux disease)  Assessment and Plan of Treatment: Avoid fatty, fried foods, acidic foods such as tomatoes, lime and chocolate. Continue to take your medications as prescribed, exercise daily and weight loss.      Diagnosis: Seizures  Assessment and Plan of Treatment: Continue your medications as directed and please follow-up as an outpatient with your primary care provider for further care and recommendations.      Diagnosis: HTN (hypertension)  Assessment and Plan of Treatment: Continue blood pressure medication regimen as directed. Monitor for any visual changes, headaches or dizziness.  Monitor blood pressure regularly.  Follow up with your PCP for further management for high blood pressure.       PRINCIPAL DISCHARGE DIAGNOSIS  Diagnosis: Shortness of breath  Assessment and Plan of Treatment: You came to the hospital with shortness of breath and required additional oxygen supplementation and treatment with steroids. Continue with steroid taper: Prednisone 30mg x4 days, then 20mg x4 days, then 10mg x4 days, then 5mg x4 days and then stop. Your oxygen levels have improved however you should continue to wear your 4 liters of oxygen as prescribed. Your echocardiogram (ultrasound of the heart) did not reveal concerning findings at this time. Your CT scan of the chest revealed your pulmonary fibrosis (lung disease) which appears to be progressively worsening. The CT scan also showed nodular thickening in some areas of the lung. You need to have another CT scan in 3 months to make sure this is stable.      SECONDARY DISCHARGE DIAGNOSES  Diagnosis: HTN (hypertension)  Assessment and Plan of Treatment: Your blood pressure was normal when in the hospital so your blood pressure medication was stopped. Continue to monitor your blood pressure daily and follow up routinely with your primary care provider.        PRINCIPAL DISCHARGE DIAGNOSIS  Diagnosis: Shortness of breath  Assessment and Plan of Treatment: You came to the hospital with shortness of breath and required additional oxygen supplementation and treatment with steroids. Continue with steroid taper: Prednisone 30mg x4 days, then 20mg x4 days, then 10mg x4 days, then 5mg x4 days and then stop. Your oxygen levels have improved however you should continue to wear your 4 liters of oxygen as prescribed. Your echocardiogram (ultrasound of the heart) did not reveal concerning findings at this time. Your CT scan of the chest revealed your pulmonary fibrosis (lung disease) which appears to be progressively worsening. The CT scan also showed nodular thickening in some areas of the lung. You need to have another CT scan in 3 months to make sure this is stable.

## 2021-11-12 NOTE — DISCHARGE NOTE PROVIDER - DETAILS OF MALNUTRITION DIAGNOSIS/DIAGNOSES
This patient has been assessed with a concern for Malnutrition and was treated during this hospitalization for the following Nutrition diagnosis/diagnoses:     -  11/15/2021: Moderate protein-calorie malnutrition   -  11/15/2021: Underweight (BMI < 19)

## 2021-11-12 NOTE — PROGRESS NOTE ADULT - PROBLEM SELECTOR PLAN 8
-DVT Proph: Lovenox   GI: Protonix.    Dispo- Pending placement. HCP papers received. Pt remains med stable. -DVT Proph: Lovenox   GI: Protonix.    Dispo- Pending placement. HCP papers received. Pt remains med stable. ECHO is pending, but likely can complete as OP. Will reassess.

## 2021-11-12 NOTE — DISCHARGE NOTE PROVIDER - NSFOLLOWUPCLINICS_GEN_ALL_ED_FT
[facility] Pulmonolgy and Sleep Medicine  Pulmonology  [address] [place] [address], [place] [address]  Phone: [phone]  Fax:

## 2021-11-12 NOTE — PROGRESS NOTE ADULT - SUBJECTIVE AND OBJECTIVE BOX
[facility] Division of Hospital Medicine  Valentin LopezCarson) MD Marco A  Pager [phone]    SUBJECTIVE:  Chief complaint: SOB.     ID: [id number]  Pt seen and evaluated at bedside this AM. Feels well. No complaints. denies any SOb/cp/nv/f/c.    ROS: All systems negative except as noted.      Vital Signs Last 24 Hrs  T(C): 36.3 (12 Nov 2021 11:11), Max: 36.6 (12 Nov 2021 00:25)  T(F): 97.4 (12 Nov 2021 11:11), Max: 97.9 (12 Nov 2021 00:25)  HR: 91 (12 Nov 2021 11:11) (83 - 98)  BP: 109/67 (12 Nov 2021 11:11) (103/61 - 116/72)  BP(mean): --  RR: 19 (12 Nov 2021 11:11) (19 - 20)  SpO2: 96% (12 Nov 2021 11:11) (96% - 99%)      PHYSICAL EXAM:  Gen- In bed, comfortable, NAD  Eyes- EOMI, PERRLA, nonicteric.  EMNT- Fair dentition. MMM. No tonsilar exudates. No posterior pharynx erythema.  Neck- Supple. No masses. No tracheal deviation.  Resp- CTABL. Good effort. No r/r/w. No accessory muscle use. Nonlabored breathing.  CVS- RRR, S1S2, no g/r/m. No LE edema.  GI- Soft abd, NT, ND, +BSx4. No HSM.  MSK- No C/C. ROM intact. No crepitus.  Psych- Appropriate mood/affect.      MEDICATION:  MEDICATIONS  (STANDING):  BACItracin   Ointment 1 Application(s) Topical two times a day  budesonide  80 MICROgram(s)/formoterol 4.5 MICROgram(s) Inhaler 2 Puff(s) Inhalation two times a day  enoxaparin Injectable 40 milliGRAM(s) SubCutaneous daily  escitalopram 10 milliGRAM(s) Oral daily  levETIRAcetam 500 milliGRAM(s) Oral two times a day  montelukast 10 milliGRAM(s) Oral at bedtime  multivitamin 1 Tablet(s) Oral daily  pantoprazole    Tablet 40 milliGRAM(s) Oral before breakfast  predniSONE   Tablet 40 milliGRAM(s) Oral daily  tamsulosin 0.4 milliGRAM(s) Oral at bedtime  tiotropium 18 MICROgram(s) Capsule 1 Capsule(s) Inhalation daily    MEDICATIONS  (PRN):  levalbuterol Inhalation 0.63 milliGRAM(s) Inhalation every 6 hours PRN SOB/Wheezing        LABORATORY:                          12.7   7.59  )-----------( 146      ( 12 Nov 2021 07:22 )             38.7     11-12    144  |  101  |  21  ----------------------------<  95  3.7   |  32<H>  |  0.78    Ca    8.8      12 Nov 2021 07:22  Phos  3.4     11-12  Mg     2.10     11-12          CARDIAC MARKERS ( 12 Nov 2021 07:22 )  x     / x     / 29 U/L / x     / x            SARS-CoV-2: Coco (07 Nov 2021 13:56)

## 2021-11-12 NOTE — DISCHARGE NOTE PROVIDER - NSDCHC_MEDRECSTATUS_GEN_ALL_CORE
Admission Reconciliation is Completed  Discharge Reconciliation is Not Complete Cold/Sinus Admission Reconciliation is Completed  Discharge Reconciliation is Completed

## 2021-11-12 NOTE — DISCHARGE NOTE PROVIDER - DISCHARGE DIET
Minced and Moist Diet/Mildly Thick Liquids Minced and Moist Diet/Nutrition Supplements/Mildly Thick Liquids

## 2021-11-12 NOTE — DISCHARGE NOTE PROVIDER - HOSPITAL COURSE
Mr Bryant is a 74yo male w/ PMH of COPD (on 4L NC home O2), ?pulm fibrosis, HTN, Depression, BPH, GERD, ?seizures (on Keppra 500mg BID) presents with SOB. Patient left against medical advice from [facility] 2 days ago. On NC 4L at baseline, presented to ED satting 65% on RA after running out of O2. Initially placed on BiPAP. MiICU consulted - deemed to be not a candidate for higher level fo care. Admitted to medicine for respiratory failure 2' pulmonary fibrosis and running out of home O2.    Acute on chronic respiratory failure with hypoxia and hypercapnia.   ·  Plan: -Blood gas parameters have improved. CTA chest with IV contrast negative for PE, likely ILD/fibrosis  Imaging is suggestive of progressive disease.  -Seen by MICU - not a candidate. SLP re-eval appreciated - resumed on modified diet.   -RRT on 11/10 for tachypnea. Stable now.  -Significant improvement clinically -- DC methylpred, start prednisone 40 and taper as tolerated.  -resume pt's home inhaler (therapeutic equivalents)  -Xopenex PRN.  -Pulm recs appreciated.    COPD, severe.   ·  Plan: Hx of pulmonary fibrosis.  Management as above.    HTN (hypertension).   ·  Plan: Pt on Atenolol 25 mg daily outpatient. BPs in acceptable range overnight. HR improved.  -Resume atenolol if necessary. For now, can continue to hold.    BPH without urinary obstruction.   ·  Plan: No S/S of urinary retention.  -Restart flomax.  -Monitor urine output and renal function.    GERD (gastroesophageal reflux disease).   ·  Plan: -CTA chest showing mild esophageal dilation  -Patient on regular diet w/ thin liquids at nursing home  -Appreciate S&S follow up - now on modified diet minced/Moist with mildly thickened liquids  -Continue PPI.    Depression, major.   ·  Plan: Patient was on Escitalopram 10 mg daily   -Patient w/ documented history of MDD  -Currently denying depression/anxiety and SI/HI  -Resume escitalopram 10 daily.    Seizures.   ·  Plan: No seizure episodes. Stable.   -Cont Keppra PO.  - Aspiration precautions, seizure precautions.     Prophylactic measure.   ·  Plan: -DVT Proph: Lovenox   GI: Protonix.    On ___ this case was reviewed with  ____, the patient is medically stable and optimized for discharge. All medications were reviewed and prescriptions were sent to mutually agreed upon pharmacy.       Mr Bryant is a 74yo male w/ PMH of COPD (on 4L NC home O2), ?pulm fibrosis, HTN, Depression, BPH, GERD, ?seizures (on Keppra 500mg BID) presents with SOB. Patient left against medical advice from [facility] 2 days ago. On NC 4L at baseline, presented to ED satting 65% on RA after running out of O2. Initially placed on BiPAP. MiICU consulted - deemed to be not a candidate for higher level fo care. Admitted to medicine for respiratory failure 2' pulmonary fibrosis and running out of home O2.    Acute on chronic respiratory failure with hypoxia and hypercapnia.   ·Plan: -Blood gas parameters have improved. CTA chest with IV contrast negative for PE, likely ILD/fibrosis  Imaging is suggestive of progressive disease.  -Seen by MICU - not a candidate. SLP re-eval appreciated - resumed on modified diet.   -RRT on 11/10 for tachypnea. Stable now.  -Significant improvement clinically -- DC methylpred, start prednisone 40 and taper as tolerated.  -resume pt's home inhaler (therapeutic equivalents)  -Xopenex PRN.  -Pulm recs appreciated.    COPD, severe.   ·  Plan: Hx of pulmonary fibrosis.  Management as above.    HTN (hypertension).   ·  Plan: Pt on Atenolol 25 mg daily outpatient. BPs in acceptable range overnight. HR improved.  -Resume atenolol if necessary. For now, can continue to hold.    BPH without urinary obstruction.   ·  Plan: No S/S of urinary retention.  -Restart flomax.  -Monitor urine output and renal function.    GERD (gastroesophageal reflux disease).   ·  Plan: -CTA chest showing mild esophageal dilation  -Patient on regular diet w/ thin liquids at nursing home  -Appreciate S&S follow up - now on modified diet minced/Moist with mildly thickened liquids  -Continue PPI.    Depression, major.   ·  Plan: Patient was on Escitalopram 10 mg daily   -Patient w/ documented history of MDD  -Currently denying depression/anxiety and SI/HI  -Resume escitalopram 10 daily.    Seizures.   ·  Plan: No seizure episodes. Stable.   -Cont Keppra PO.  - Aspiration precautions, seizure precautions.     Prophylactic measure.   ·  Plan: -DVT Proph: Lovenox   GI: Protonix.    On ___ this case was reviewed with  ____, the patient is medically stable and optimized for discharge. All medications were reviewed and prescriptions were sent to mutually agreed upon pharmacy.       76yo male w/ PMH of COPD (on 4L NC home O2), ?pulm fibrosis, HTN, Depression, BPH, GERD, ?seizures (on Keppra 500mg BID) presents with SOB. Patient left against medical advice from [facility] 2 days ago. On NC 4L at baseline, presented to ED satting 65% on RA after running out of O2. Initially placed on BiPAP. MICU consulted - deemed to be not a candidate for higher level of care. Admitted to medicine for respiratory failure secondary to pulmonary fibrosis and running out of home O2. CTA chest with IV contrast negative for PE, likely ILD/fibrosis  Imaging is suggestive of progressive disease. Patient significantly improved with steroid taper which will be continued at nursing home. ETTE with LVEF 45-50% and no significant valvular abnormalities or WMA.      CT Chest with Indeterminate nodular thickening of the major fissures, possibly related to fibrosis. 3 month follow-up is recommended to assess for stability.    Patient seen and evaluated. Reviewed discharge medications with patient and attending. All new medications requiring new prescriptions were sent to the pharmacy of patient's choice. Reviewed need for prescription for previous home medications and new prescriptions sent if requested. Medically cleared/stable for discharge as per Dr. Briseno on 11/15/2021 with appropriate follow up. Patient understands and agrees with plan of care.

## 2021-11-13 LAB
ANION GAP SERPL CALC-SCNC: 8 MMOL/L — SIGNIFICANT CHANGE UP (ref 7–14)
BUN SERPL-MCNC: 17 MG/DL — SIGNIFICANT CHANGE UP (ref 7–23)
CALCIUM SERPL-MCNC: 8.7 MG/DL — SIGNIFICANT CHANGE UP (ref 8.4–10.5)
CHLORIDE SERPL-SCNC: 101 MMOL/L — SIGNIFICANT CHANGE UP (ref 98–107)
CO2 SERPL-SCNC: 29 MMOL/L — SIGNIFICANT CHANGE UP (ref 22–31)
CREAT SERPL-MCNC: 0.71 MG/DL — SIGNIFICANT CHANGE UP (ref 0.5–1.3)
GLUCOSE SERPL-MCNC: 97 MG/DL — SIGNIFICANT CHANGE UP (ref 70–99)
HCT VFR BLD CALC: 40 % — SIGNIFICANT CHANGE UP (ref 39–50)
HGB BLD-MCNC: 12.8 G/DL — LOW (ref 13–17)
LEVETIRACETAM SERPL-MCNC: 17.7 UG/ML — SIGNIFICANT CHANGE UP (ref 10–40)
MAGNESIUM SERPL-MCNC: 2.1 MG/DL — SIGNIFICANT CHANGE UP (ref 1.6–2.6)
MCHC RBC-ENTMCNC: 29.6 PG — SIGNIFICANT CHANGE UP (ref 27–34)
MCHC RBC-ENTMCNC: 32 GM/DL — SIGNIFICANT CHANGE UP (ref 32–36)
MCV RBC AUTO: 92.6 FL — SIGNIFICANT CHANGE UP (ref 80–100)
NRBC # BLD: 0 /100 WBCS — SIGNIFICANT CHANGE UP
NRBC # FLD: 0 K/UL — SIGNIFICANT CHANGE UP
PHOSPHATE SERPL-MCNC: 3.1 MG/DL — SIGNIFICANT CHANGE UP (ref 2.5–4.5)
PLATELET # BLD AUTO: 138 K/UL — LOW (ref 150–400)
POTASSIUM SERPL-MCNC: 4 MMOL/L — SIGNIFICANT CHANGE UP (ref 3.5–5.3)
POTASSIUM SERPL-SCNC: 4 MMOL/L — SIGNIFICANT CHANGE UP (ref 3.5–5.3)
RBC # BLD: 4.32 M/UL — SIGNIFICANT CHANGE UP (ref 4.2–5.8)
RBC # FLD: 12.2 % — SIGNIFICANT CHANGE UP (ref 10.3–14.5)
SODIUM SERPL-SCNC: 138 MMOL/L — SIGNIFICANT CHANGE UP (ref 135–145)
WBC # BLD: 7.88 K/UL — SIGNIFICANT CHANGE UP (ref 3.8–10.5)
WBC # FLD AUTO: 7.88 K/UL — SIGNIFICANT CHANGE UP (ref 3.8–10.5)

## 2021-11-13 PROCEDURE — 93306 TTE W/DOPPLER COMPLETE: CPT | Mod: 26

## 2021-11-13 PROCEDURE — 99232 SBSQ HOSP IP/OBS MODERATE 35: CPT

## 2021-11-13 RX ADMIN — Medication 40 MILLIGRAM(S): at 06:46

## 2021-11-13 RX ADMIN — TIOTROPIUM BROMIDE 1 CAPSULE(S): 18 CAPSULE ORAL; RESPIRATORY (INHALATION) at 09:35

## 2021-11-13 RX ADMIN — LEVETIRACETAM 500 MILLIGRAM(S): 250 TABLET, FILM COATED ORAL at 00:26

## 2021-11-13 RX ADMIN — LEVETIRACETAM 500 MILLIGRAM(S): 250 TABLET, FILM COATED ORAL at 12:25

## 2021-11-13 RX ADMIN — BUDESONIDE AND FORMOTEROL FUMARATE DIHYDRATE 2 PUFF(S): 160; 4.5 AEROSOL RESPIRATORY (INHALATION) at 22:25

## 2021-11-13 RX ADMIN — Medication 1 APPLICATION(S): at 17:19

## 2021-11-13 RX ADMIN — Medication 1 TABLET(S): at 12:26

## 2021-11-13 RX ADMIN — LEVETIRACETAM 500 MILLIGRAM(S): 250 TABLET, FILM COATED ORAL at 22:54

## 2021-11-13 RX ADMIN — BUDESONIDE AND FORMOTEROL FUMARATE DIHYDRATE 2 PUFF(S): 160; 4.5 AEROSOL RESPIRATORY (INHALATION) at 09:35

## 2021-11-13 RX ADMIN — MONTELUKAST 10 MILLIGRAM(S): 4 TABLET, CHEWABLE ORAL at 22:26

## 2021-11-13 RX ADMIN — ESCITALOPRAM OXALATE 10 MILLIGRAM(S): 10 TABLET, FILM COATED ORAL at 12:26

## 2021-11-13 RX ADMIN — PANTOPRAZOLE SODIUM 40 MILLIGRAM(S): 20 TABLET, DELAYED RELEASE ORAL at 06:47

## 2021-11-13 RX ADMIN — TAMSULOSIN HYDROCHLORIDE 0.4 MILLIGRAM(S): 0.4 CAPSULE ORAL at 22:26

## 2021-11-13 RX ADMIN — ENOXAPARIN SODIUM 40 MILLIGRAM(S): 100 INJECTION SUBCUTANEOUS at 12:25

## 2021-11-13 RX ADMIN — Medication 1 APPLICATION(S): at 06:45

## 2021-11-13 NOTE — PROGRESS NOTE ADULT - PROBLEM SELECTOR PLAN 8
-DVT Proph: Lovenox   GI: Protonix.    Dispo- Pending placement. HCP papers received. Hopefully on Mon will RUBEN.

## 2021-11-13 NOTE — PROGRESS NOTE ADULT - SUBJECTIVE AND OBJECTIVE BOX
[facility] Division of Hospital Medicine  Valentin LopezCarson) MD Marco A  Pager [phone]    SUBJECTIVE:  Chief complaint: SOB.     ID: [id number]  Pt seen and evaluated at bedside this AM. Feeling better. No complaints. Asked about plan after leaving hospital. Disucssed pending placement.     ROS: All systems negative except as noted.      Vital Signs Last 24 Hrs  T(C): 36.5 (13 Nov 2021 09:12), Max: 36.7 (12 Nov 2021 21:55)  T(F): 97.7 (13 Nov 2021 09:12), Max: 98 (12 Nov 2021 21:55)  HR: 106 (13 Nov 2021 09:12) (77 - 106)  BP: 105/61 (13 Nov 2021 09:12) (99/61 - 109/63)  BP(mean): --  RR: 20 (13 Nov 2021 09:12) (18 - 20)  SpO2: 98% (13 Nov 2021 09:12) (97% - 100%)      PHYSICAL EXAM:  Gen- In bed, comfortable, NAD  Eyes- EOMI, PERRLA, nonicteric.  EMNT- Fair dentition. MMM. No tonsilar exudates. No posterior pharynx erythema.  Neck- Supple. No masses. No tracheal deviation.  Resp- CTABL. Good effort. No r/r/w. No accessory muscle use. Nonlabored breathing.  CVS- RRR, S1S2, no g/r/m. No LE edema.  GI- Soft abd, NT, ND, +BSx4. No HSM.  MSK- No C/C. ROM intact. No crepitus.  Psych- Appropriate mood/affect.      MEDICATION:  MEDICATIONS  (STANDING):  BACItracin   Ointment 1 Application(s) Topical two times a day  budesonide  80 MICROgram(s)/formoterol 4.5 MICROgram(s) Inhaler 2 Puff(s) Inhalation two times a day  enoxaparin Injectable 40 milliGRAM(s) SubCutaneous daily  escitalopram 10 milliGRAM(s) Oral daily  levETIRAcetam 500 milliGRAM(s) Oral two times a day  montelukast 10 milliGRAM(s) Oral at bedtime  multivitamin 1 Tablet(s) Oral daily  pantoprazole    Tablet 40 milliGRAM(s) Oral before breakfast  predniSONE   Tablet 40 milliGRAM(s) Oral daily  tamsulosin 0.4 milliGRAM(s) Oral at bedtime  tiotropium 18 MICROgram(s) Capsule 1 Capsule(s) Inhalation daily    MEDICATIONS  (PRN):  levalbuterol Inhalation 0.63 milliGRAM(s) Inhalation every 6 hours PRN SOB/Wheezing        LABORATORY:                          12.8   7.88  )-----------( 138      ( 13 Nov 2021 07:11 )             40.0     11-13    138  |  101  |  17  ----------------------------<  97  4.0   |  29  |  0.71    Ca    8.7      13 Nov 2021 07:11  Phos  3.1     11-13  Mg     2.10     11-13          CARDIAC MARKERS ( 12 Nov 2021 07:22 )  x     / x     / 29 U/L / x     / x            SARS-CoV-2: Cococ (07 Nov 2021 13:56)

## 2021-11-13 NOTE — PROGRESS NOTE ADULT - PROBLEM SELECTOR PLAN 1
-Blood gas parameters have improved. CTA chest with IV contrast negative for PE, likely ILD/fibrosis  Imaging is suggestive of progressive disease.  -Seen by MICU - not a candidate. SLP re-eval appreciated - resumed on modified diet.   -RRT on 11/10 for tachypnea. Stable now.  -Significant improvement clinically -- cont prednisone and taper as ordered  -Cont inhaler (therapeutic equivalents from home regimen)  -Xopenex PRN.  -Pulm recs appreciated.  -ECHO reviewed - LVEF 45-50%, mildly reduced. Diastolic stage I. Chronic valve issues.

## 2021-11-14 LAB — SARS-COV-2 RNA SPEC QL NAA+PROBE: SIGNIFICANT CHANGE UP

## 2021-11-14 PROCEDURE — 99232 SBSQ HOSP IP/OBS MODERATE 35: CPT

## 2021-11-14 RX ADMIN — ENOXAPARIN SODIUM 40 MILLIGRAM(S): 100 INJECTION SUBCUTANEOUS at 12:20

## 2021-11-14 RX ADMIN — TAMSULOSIN HYDROCHLORIDE 0.4 MILLIGRAM(S): 0.4 CAPSULE ORAL at 21:49

## 2021-11-14 RX ADMIN — Medication 1 APPLICATION(S): at 17:42

## 2021-11-14 RX ADMIN — ESCITALOPRAM OXALATE 10 MILLIGRAM(S): 10 TABLET, FILM COATED ORAL at 12:19

## 2021-11-14 RX ADMIN — Medication 1 APPLICATION(S): at 06:18

## 2021-11-14 RX ADMIN — BUDESONIDE AND FORMOTEROL FUMARATE DIHYDRATE 2 PUFF(S): 160; 4.5 AEROSOL RESPIRATORY (INHALATION) at 10:11

## 2021-11-14 RX ADMIN — Medication 40 MILLIGRAM(S): at 06:19

## 2021-11-14 RX ADMIN — PANTOPRAZOLE SODIUM 40 MILLIGRAM(S): 20 TABLET, DELAYED RELEASE ORAL at 06:19

## 2021-11-14 RX ADMIN — TIOTROPIUM BROMIDE 1 CAPSULE(S): 18 CAPSULE ORAL; RESPIRATORY (INHALATION) at 10:11

## 2021-11-14 RX ADMIN — LEVETIRACETAM 500 MILLIGRAM(S): 250 TABLET, FILM COATED ORAL at 10:11

## 2021-11-14 RX ADMIN — BUDESONIDE AND FORMOTEROL FUMARATE DIHYDRATE 2 PUFF(S): 160; 4.5 AEROSOL RESPIRATORY (INHALATION) at 21:49

## 2021-11-14 RX ADMIN — MONTELUKAST 10 MILLIGRAM(S): 4 TABLET, CHEWABLE ORAL at 21:49

## 2021-11-14 RX ADMIN — Medication 1 TABLET(S): at 12:20

## 2021-11-14 NOTE — PROGRESS NOTE ADULT - SUBJECTIVE AND OBJECTIVE BOX
[facility] Division of Hospital Medicine  Valentin Larson) MD Marco A  Pager [phone]    SUBJECTIVE:  Chief complaint: SOB.     declined. Daughter (Soumya) on phone and provided translation assistance.  Pt seen and evaluated at bedside this AM. No complaints. Breathing fine. Wanted clarification on dispo plan. Agreed w/ pending placement.  No f/c, eating fine. No bowel/urinary complaints.     ROS: All systems negative except as noted.      Vital Signs Last 24 Hrs  T(C): 36.4 (14 Nov 2021 09:38), Max: 37.1 (14 Nov 2021 06:16)  T(F): 97.6 (14 Nov 2021 09:38), Max: 98.7 (14 Nov 2021 06:16)  HR: 95 (14 Nov 2021 09:38) (78 - 95)  BP: 100/58 (14 Nov 2021 09:38) (100/58 - 121/73)  BP(mean): --  RR: 17 (14 Nov 2021 09:38) (17 - 18)  SpO2: 98% (14 Nov 2021 09:38) (98% - 100%)    PHYSICAL EXAM:  Gen- In bed, comfortable, NAD  Eyes- EOMI, PERRLA, nonicteric.  EMNT- Fair dentition. MMM. No tonsilar exudates. No posterior pharynx erythema.  Neck- Supple. No masses. No tracheal deviation.  Resp- CTABL. Good effort. No r/r/w. No accessory muscle use. Nonlabored breathing.  CVS- RRR, S1S2, no g/r/m. No LE edema.  GI- Soft abd, NT, ND, +BSx4. No HSM.  MSK- No C/C. ROM intact. No crepitus.  Psych- Appropriate mood/affect.      MEDICATION:  MEDICATIONS  (STANDING):  BACItracin   Ointment 1 Application(s) Topical two times a day  budesonide  80 MICROgram(s)/formoterol 4.5 MICROgram(s) Inhaler 2 Puff(s) Inhalation two times a day  enoxaparin Injectable 40 milliGRAM(s) SubCutaneous daily  escitalopram 10 milliGRAM(s) Oral daily  levETIRAcetam 500 milliGRAM(s) Oral two times a day  montelukast 10 milliGRAM(s) Oral at bedtime  multivitamin 1 Tablet(s) Oral daily  pantoprazole    Tablet 40 milliGRAM(s) Oral before breakfast  predniSONE   Tablet 40 milliGRAM(s) Oral daily  tamsulosin 0.4 milliGRAM(s) Oral at bedtime  tiotropium 18 MICROgram(s) Capsule 1 Capsule(s) Inhalation daily    MEDICATIONS  (PRN):  levalbuterol Inhalation 0.63 milliGRAM(s) Inhalation every 6 hours PRN SOB/Wheezing        LABORATORY:                          12.8   7.88  )-----------( 138      ( 13 Nov 2021 07:11 )             40.0     11-13    138  |  101  |  17  ----------------------------<  97  4.0   |  29  |  0.71    Ca    8.7      13 Nov 2021 07:11  Phos  3.1     11-13  Mg     2.10     11-13                SARS-CoV-2: Coco (07 Nov 2021 13:56)

## 2021-11-14 NOTE — PROGRESS NOTE ADULT - PROBLEM SELECTOR PLAN 8
-DVT Proph: Lovenox   GI: Protonix.    Dispo- Pending placement. HCP papers received. Hopefully on Mon will RUBEN. COVID swab today.

## 2021-11-15 ENCOUNTER — TRANSCRIPTION ENCOUNTER (OUTPATIENT)
Age: 76
End: 2021-11-15

## 2021-11-15 VITALS — WEIGHT: 105.6 LBS

## 2021-11-15 LAB
ALDOLASE SERPL-CCNC: 7.6 U/L — SIGNIFICANT CHANGE UP (ref 3.3–10.3)
DSDNA AB SER-ACNC: <12 IU/ML — SIGNIFICANT CHANGE UP

## 2021-11-15 PROCEDURE — 99239 HOSP IP/OBS DSCHRG MGMT >30: CPT

## 2021-11-15 RX ORDER — TAMSULOSIN HYDROCHLORIDE 0.4 MG/1
1 CAPSULE ORAL
Qty: 0 | Refills: 0 | DISCHARGE

## 2021-11-15 RX ORDER — ATENOLOL 25 MG/1
1 TABLET ORAL
Qty: 0 | Refills: 0 | DISCHARGE

## 2021-11-15 RX ORDER — PANTOPRAZOLE SODIUM 20 MG/1
1 TABLET, DELAYED RELEASE ORAL
Qty: 0 | Refills: 0 | DISCHARGE
Start: 2021-11-15

## 2021-11-15 RX ORDER — ATENOLOL 25 MG/1
1 TABLET ORAL
Qty: 60 | Refills: 0
Start: 2021-11-15 | End: 2021-12-14

## 2021-11-15 RX ORDER — LEVETIRACETAM 250 MG/1
1 TABLET, FILM COATED ORAL
Qty: 0 | Refills: 0 | DISCHARGE

## 2021-11-15 RX ORDER — ESCITALOPRAM OXALATE 10 MG/1
1 TABLET, FILM COATED ORAL
Qty: 0 | Refills: 0 | DISCHARGE

## 2021-11-15 RX ORDER — ALBUTEROL 90 UG/1
2 AEROSOL, METERED ORAL
Qty: 0 | Refills: 0 | DISCHARGE

## 2021-11-15 RX ORDER — FAMOTIDINE 10 MG/ML
1 INJECTION INTRAVENOUS
Qty: 0 | Refills: 0 | DISCHARGE

## 2021-11-15 RX ORDER — MONTELUKAST 4 MG/1
1 TABLET, CHEWABLE ORAL
Qty: 0 | Refills: 0 | DISCHARGE

## 2021-11-15 RX ADMIN — Medication 1 TABLET(S): at 12:22

## 2021-11-15 RX ADMIN — LEVETIRACETAM 500 MILLIGRAM(S): 250 TABLET, FILM COATED ORAL at 00:26

## 2021-11-15 RX ADMIN — BUDESONIDE AND FORMOTEROL FUMARATE DIHYDRATE 2 PUFF(S): 160; 4.5 AEROSOL RESPIRATORY (INHALATION) at 10:29

## 2021-11-15 RX ADMIN — Medication 40 MILLIGRAM(S): at 05:48

## 2021-11-15 RX ADMIN — PANTOPRAZOLE SODIUM 40 MILLIGRAM(S): 20 TABLET, DELAYED RELEASE ORAL at 05:50

## 2021-11-15 RX ADMIN — TIOTROPIUM BROMIDE 1 CAPSULE(S): 18 CAPSULE ORAL; RESPIRATORY (INHALATION) at 10:29

## 2021-11-15 RX ADMIN — ESCITALOPRAM OXALATE 10 MILLIGRAM(S): 10 TABLET, FILM COATED ORAL at 12:23

## 2021-11-15 RX ADMIN — LEVETIRACETAM 500 MILLIGRAM(S): 250 TABLET, FILM COATED ORAL at 12:22

## 2021-11-15 RX ADMIN — ENOXAPARIN SODIUM 40 MILLIGRAM(S): 100 INJECTION SUBCUTANEOUS at 12:22

## 2021-11-15 RX ADMIN — Medication 1 APPLICATION(S): at 05:48

## 2021-11-15 NOTE — PROGRESS NOTE ADULT - PROBLEM SELECTOR PLAN 8
-DVT Proph: Lovenox   GI: Protonix.    Dispo- Pending placement. HCP papers received. Hopefully on Mon will RUBEN. COVID swab today. -DVT Proph: Lovenox   GI: Protonix.    Dispo- Pending placement. HCP papers received

## 2021-11-15 NOTE — PROGRESS NOTE ADULT - PROBLEM SELECTOR PLAN 5
-CTA chest showing mild esophageal dilation  -Patient on regular diet w/ thin liquids at nursing home  -Appreciate SLp follow up - now on modified diet.  -Continue PPI.
-CTA chest showing mild esophageal dilation  -Patient on regular diet w/ thin liquids at nursing home  -Appreciate SLp follow up - now on modified diet.  -Continue PPI.
-CTA chest showing mild esophageal dilation  -Patient on regular diet w/ thin liquids at nursing home  -Strict NPO until S&S eval and while on BiPAP  -Cont IV Protonix 40 mg daily
-CTA chest showing mild esophageal dilation  -Patient on regular diet w/ thin liquids at nursing home  -Appreciate SLp follow up - now on modified diet.  -Continue PPI.
-CTA chest showing mild esophageal dilation  -Patient on regular diet w/ thin liquids at nursing home  -Strict NPO until S&S eval and while on BiPAP  -Cont IV Protonix 40 mg daily

## 2021-11-15 NOTE — DIETITIAN INITIAL EVALUATION ADULT. - MALNUTRITION
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  He was seen in 2018 with acute decompensated systolic heart failure.  He is been followed by his primary care physician prior to that he was living in [place].  I have records from [place] which showed a stress test in 2018 that shows ischemia in the septum and inferior region with ejection fraction of 28%.  More recently his echocardiac  shows EF of 15-20% in 2018.  He has been in rehab after hospitalization and now comes in for follow-up.  Is been seen by his primary care physician.  He is been not an appropriate therapy other than statin therapy because we do not know his true coronary anatomy.  He is off ACE inhibitor secondary to allergy.  He has been having dyspnea on exertion.  His lower extremity most significantly improved.  He has no orthopnea or PND.  He has likely class II-III symptoms.  New York Heart Association.  No clear angina.    PAST MEDICAL HISTORY:    Past Medical History:   Diagnosis Date   • AF (atrial fibrillation) (CMS/HCC)     Permanent   • Cardiomyopathy (CMS/HCC)     Ejection fraction 15-20%, ischemic and nonischemic based on stress test from 2018, pending coronary angiography   • COPD (chronic obstructive pulmonary disease) (CMS/HCC)    • Essential hypertension    • Left bundle branch block    • Mixed hyperlipidemia    • Systolic heart failure (CMS/HCC)    • TIA (transient ischemic attack)         ALLERGIES:    ALLERGIES:   Allergen Reactions   • Lisinopril ANAPHYLAXIS        MEDICATIONS:     Outpatient Encounter Medications as of 2019   Medication Sig Dispense Refill   • saccharomyces boulardii (FLORASTOR) 250 MG capsule Take 250 mg by mouth 1 day a week.     • metoPROLOL succinate (TOPROL XL) 25 MG 24 hr tablet Take 25 mg by mouth daily.     • bumetanide (BUMEX) 1 MG tablet Take 1 mg by mouth 2 times daily.     • Cyanocobalamin (VITAMIN B 12 PO) Take 1,000 mg by mouth.     • finasteride (PROSCAR) 5 MG tablet Take 5 mg by mouth daily.     • omeprazole (PRILOSEC) 20 MG capsule Take 20 mg by mouth daily.     • rivaroxaban (XARELTO) 15 MG Tab Take 15 mg by mouth daily (with dinner).     • simvastatin (ZOCOR) 20 MG tablet Take 20 mg by mouth nightly.     • spironolactone (ALDACTONE) 25 MG tablet Take 25 mg by mouth daily.     • [DISCONTINUED] acetaminophen (TYLENOL) 325 MG tablet Take 650 mg by mouth every 4 hours as needed.     • [DISCONTINUED] Probiotic Product (ALIGN) 4 MG Cap      • [DISCONTINUED] Methylcellulose, Laxative, (CITRUCEL PO)      • [DISCONTINUED] sodium chloride (OCEAN) 0.65 % nasal spray Spray 1 spray in each nostril as needed.       No facility-administered encounter medications on file as of 2019.        SOCIAL HISTORY:    Social History     Tobacco Use   • Smoking status: Former Smoker     Last attempt to quit: 1998     Years since quittin.0   • Smokeless tobacco: Never Used   Substance Use Topics   • Alcohol use: Yes     Comment: one drink a day    • Drug use: No       FAMILY HISTORY:    History reviewed. No pertinent family history.    REVIEW OF SYSTEMS:    Respiratory: Cough: denies.   Constitutional: Fatigue: denies.   Dermatologic: Rash: denies.   Endocrine: Sleep disturbance: denies.   Gastrointestinal: Abdomina pain: denies.   Hematologic: Abnormal bleeding: denies.   Musculoskeletal: Muscle aches: Yes  Neurologic: Dizziness: denies.   Ophthalmologic: Loss of vision: denies.   Psychology: Depression: denies.   Urologic: Blood in urine:  denies.   Cardiac: As per HPI    Remainder are reviewed and are negative.       PHYSICAL EXAMINATION:  /60 (BP Location: [facility])   Pulse 74   Ht 5' 11\" (1.803 m)   Wt 85.7 kg (189 lb)   BMI 26.36 kg/m²   BSA 2.06 m²     Constitutional: appears well-developed and well-nourished. Alert and oriented.   Head: Normocephalic and atraumatic.   Nose: Nose normal.   Mouth/Throat: Mucous membranes are moist.  Normal oropharynx  Eyes: Pupils are equal, round, and reactive to light.   Neck: Normal range of motion. Neck supple. No thyromegaly  Cardiovascular:  Irregular Yrn irregular, S1, S2, 2 out of 6 systolic murmur at the left lower sternal border and apex p  ulmonary: Effort normal and breath sounds normal. No wheezes, rales or rhonchi  Abdominal: Soft. Bowel sounds are normal. No hepatosplenomegaly.  Musculoskeletal: Normal range of motion. No tenderness.   Neurological: grossly normal.   Skin: Skin is warm and dry.   Psych: normal mood and affect  Extremities:  trace bilateral lower extreme edema  Pulses: LE DP/PT palpable    I personally reviewed and interpreted recent laboratory values in the chart.   LABS  Creatinine 1.2 in December 2018  CARDIAC TESTING:   ECG personally reviewed: Atrial fibrillation with left bundle branch block,  ms  Stress test in April 2018 in [place] showing ischemia in the septum and inferior wall.  Echocardiogram November 30, 2018 with EF of 15-20% with septal severe hypokinesis, mild right ventricular enlargement mild to moderate right ventricular hypokinesis, moderate mitral tricuspid insufficiency with mild to moderate primary hypertension PA systolic pressure 40 mmHg.  This was done in the setting of CHF    ASSESSMENT AND PLAN:  Chronic systolic heart failure (CMS/HCC)  Other cardiomyopathy (CMS/HCC)  Left bundle branch block  Mixed hyperlipidemia  Essential hypertension  Permanent atrial fibrillation (CMS/HCC)          Cardiomyopathy:  This is likely ischemic heart myopathy  with a component of nonischemic cardia myopathy given the global hypokinesis.  The patient has a stress test which was abnormal in 2018 but read as infarct in 2016 by our group at [facility] with an EF of 20% at that time.  The patient had decompensation recently.  He is on appropriate therapy at this time other than ACE inhibitor secondary to allergy.  I am going to defer statin at this time due to advanced age and will check an angiogram.  I discussed this with him and his daughter regarding angiography for further evaluation to see if can improve his LV function.  Risks, benefits and alternatives have been discussed with the patient agrees to proceed.  ASA of 2-3 and mallimpati is 2  Patient will start aspirin and hold xarelto prior to angiogram.     Re: ICD the patient has poor functional exercise capacity, frail and this is my first time meeting him.  He has advanced age.  I discussed this option with them and they will discussed this at the next visit with me.  Based upon the findings of the angiogram he may or may not need an ICD if he can improve his ejection fraction.  If they do not want an ICD for life-prolonging therapy he would qualify for biventricular pacemaker given left bundle branch block with cardiomyopathy.      Hypertension:  Controlled at this time continue current management.    Congestive heart failure, class II-III New York Heart Association  See above for cardia myopathy.  On appropriate therapy.  Will discuss with him at next visit regarding extra but Bumex use.  Is been sent to his nursing home as well.    Hypertension:  Controlled at this time.    Hyperlipidemia we will continue with monitoring.  Plan to add statin based upon coronary angiography    Prediabetes:  Elevated hemoglobin A1c and defer to primary care physician.    COPD:  Appears to be stable at this time    Permanent atrial fibrillation:  Continue with current therapy      Return in about 2 weeks (around 1/25/2019).  A  letter has been sent to referring physician.     Orlando Ambrocio MD, [facility]  [facility]  [facility]         Moderate protein calorie malnutrition in context of chronic illness

## 2021-11-15 NOTE — PROGRESS NOTE ADULT - PROBLEM SELECTOR PROBLEM 2
COPD, severe

## 2021-11-15 NOTE — DIETITIAN INITIAL EVALUATION ADULT. - PROBLEM SELECTOR PLAN 2
On 4L NC home O2. Now possibly with exacerbation in setting of not having adequate oxygenation.  -Previously on Prednisone taper at NH  -Start IV Solumedrol 40 mg daily, also given pulm fibrosis seen on CTA chest and wheezing on exam  -C/w Xopenex PRN   -Resume Singulair 10 mg daily when pt able to take PO meds  -CT showing likely ILD - Pulm consult to be called in AM

## 2021-11-15 NOTE — DIETITIAN INITIAL EVALUATION ADULT. - PROBLEM SELECTOR PLAN 4
No S/S of urinary retention.  -Hold Flomax for now while strict NPO, resume if able to take PO meds  -Monitor urine output and renal function

## 2021-11-15 NOTE — PROGRESS NOTE ADULT - PROBLEM SELECTOR PLAN 7
Pt on Keppra 500 mg bid at NH. Possibly in setting of seizures, though indication unclear.  - C/w IV Keppra 500 mg bid  - Aspiration precautions, seizure precautions
Pt on Keppra 500 mg bid at NH. Possibly in setting of seizures, though indication unclear.  - C/w IV Keppra 500 mg bid  - Aspiration precautions, seizure precautions
No seizure episodes. Stable.   -Cont Keppra PO.  - Aspiration precautions, seizure precautions
Pt on Keppra 500 mg bid at NH. Possibly in setting of seizures, though indication unclear.  -Switch to Keppra PO.  - Aspiration precautions, seizure precautions

## 2021-11-15 NOTE — DISCHARGE NOTE NURSING/CASE MANAGEMENT/SOCIAL WORK - PATIENT PORTAL LINK FT
You can access the FollowMyHealth Patient Portal offered by [facility] by registering at the following website: [email or web address]. By joining [facility]’s FollowMyHealth portal, you will also be able to view your health information using other applications (apps) compatible with our system.

## 2021-11-15 NOTE — DIETITIAN INITIAL EVALUATION ADULT. - PROBLEM SELECTOR PLAN 1
-Patient presenting to ED w/ O2 sat of 65% on room air and pCO2 60 on VBG. Possibly in setting of COPD exacerbation and/or ILD flare in setting of not having adequate oxygenation.  -Was previously on 4L NC at baseline, family states pt "ran out" of supplemental O2  -Now on BiPAP 12/6 FiO2, satting 98% w/ tachypnea to 40s  -CTA chest with IV contrast negative for PE, likely ILD/fibrosis   -Seen by MICU - not a candidate  -C/w BiPAP, wean as tolerated  -F/u VBG in AM  -Continuous pulse ox monitoring  -Keep strict NPO while on NPO  -Plan as below for COPD

## 2021-11-15 NOTE — PROGRESS NOTE ADULT - PROBLEM SELECTOR PROBLEM 6
Depression, major

## 2021-11-15 NOTE — DIETITIAN INITIAL EVALUATION ADULT. - PROBLEM SELECTOR PLAN 5
-CTA chest showing mild esophageal dilation  -Patient on regular diet w/ thin liquids at nursing home  -Strict NPO until S&S eval and while on BiPAP  -Start IV Protonix 40 mg daily

## 2021-11-15 NOTE — PROGRESS NOTE ADULT - SUBJECTIVE AND OBJECTIVE BOX
Patient is a 75y old  Male who presents with a chief complaint of Hypoxia (14 Nov 2021 13:47)      SUBJECTIVE / OVERNIGHT EVENTS:    MEDICATIONS  (STANDING):  BACItracin   Ointment 1 Application(s) Topical two times a day  budesonide  80 MICROgram(s)/formoterol 4.5 MICROgram(s) Inhaler 2 Puff(s) Inhalation two times a day  enoxaparin Injectable 40 milliGRAM(s) SubCutaneous daily  escitalopram 10 milliGRAM(s) Oral daily  levETIRAcetam 500 milliGRAM(s) Oral two times a day  montelukast 10 milliGRAM(s) Oral at bedtime  multivitamin 1 Tablet(s) Oral daily  pantoprazole    Tablet 40 milliGRAM(s) Oral before breakfast  tamsulosin 0.4 milliGRAM(s) Oral at bedtime  tiotropium 18 MICROgram(s) Capsule 1 Capsule(s) Inhalation daily    MEDICATIONS  (PRN):  levalbuterol Inhalation 0.63 milliGRAM(s) Inhalation every 6 hours PRN SOB/Wheezing      Vital Signs Last 24 Hrs  T(C): 36.4 (15 Nov 2021 10:15), Max: 36.6 (14 Nov 2021 21:25)  T(F): 97.5 (15 Nov 2021 10:15), Max: 97.9 (14 Nov 2021 21:25)  HR: 96 (15 Nov 2021 10:15) (94 - 113)  BP: 109/70 (15 Nov 2021 10:15) (109/70 - 123/75)  BP(mean): --  RR: 18 (15 Nov 2021 10:15) (18 - 18)  SpO2: 100% (15 Nov 2021 10:15) (99% - 100%)  CAPILLARY BLOOD GLUCOSE        I&O's Summary    14 Nov 2021 07:01  -  15 Nov 2021 07:00  --------------------------------------------------------  IN: 0 mL / OUT: 400 mL / NET: -400 mL        PHYSICAL EXAM:  GENERAL: NAD, well-developed  HEAD:  Atraumatic, Normocephalic  EYES: EOMI, PERRLA, conjunctiva and sclera clear  NECK: Supple, No JVD  CHEST/LUNG: Clear to auscultation bilaterally; No wheeze  HEART: Regular rate and rhythm; No murmurs, rubs, or gallops  ABDOMEN: Soft, Nontender, Nondistended; Bowel sounds present  EXTREMITIES:  2+ Peripheral Pulses, No clubbing, cyanosis, or edema  PSYCH: AAOx3  NEUROLOGY: non-focal  SKIN: No rashes or lesions    LABS:                    RADIOLOGY & ADDITIONAL TESTS:    Imaging Personally Reviewed:    Consultant(s) Notes Reviewed:      Care Discussed with Consultants/Other Providers:   Patient is a 75y old  Male who presents with a chief complaint of Hypoxia (14 Nov 2021 13:47)      SUBJECTIVE / OVERNIGHT EVENTS: patient seen and examined by bedside, denies headache, dizziness, SOB, CP, Palpitations , N/V/D, abdominal pain  pt says he is feeling much better than before   history obtained with [place] translation (pacific  ID#[id number])             MEDICATIONS  (STANDING):  BACItracin   Ointment 1 Application(s) Topical two times a day  budesonide  80 MICROgram(s)/formoterol 4.5 MICROgram(s) Inhaler 2 Puff(s) Inhalation two times a day  enoxaparin Injectable 40 milliGRAM(s) SubCutaneous daily  escitalopram 10 milliGRAM(s) Oral daily  levETIRAcetam 500 milliGRAM(s) Oral two times a day  montelukast 10 milliGRAM(s) Oral at bedtime  multivitamin 1 Tablet(s) Oral daily  pantoprazole    Tablet 40 milliGRAM(s) Oral before breakfast  tamsulosin 0.4 milliGRAM(s) Oral at bedtime  tiotropium 18 MICROgram(s) Capsule 1 Capsule(s) Inhalation daily    MEDICATIONS  (PRN):  levalbuterol Inhalation 0.63 milliGRAM(s) Inhalation every 6 hours PRN SOB/Wheezing      Vital Signs Last 24 Hrs  T(C): 36.4 (15 Nov 2021 10:15), Max: 36.6 (14 Nov 2021 21:25)  T(F): 97.5 (15 Nov 2021 10:15), Max: 97.9 (14 Nov 2021 21:25)  HR: 96 (15 Nov 2021 10:15) (94 - 113)  BP: 109/70 (15 Nov 2021 10:15) (109/70 - 123/75)  BP(mean): --  RR: 18 (15 Nov 2021 10:15) (18 - 18)  SpO2: 100% (15 Nov 2021 10:15) (99% - 100%)  CAPILLARY BLOOD GLUCOSE        I&O's Summary    14 Nov 2021 07:01  -  15 Nov 2021 07:00  --------------------------------------------------------  IN: 0 mL / OUT: 400 mL / NET: -400 mL        PHYSICAL EXAM:  GENERAL: NAD, well-developed  HEAD:  Atraumatic, Normocephalic  EYES: EOMI, PERRLA, conjunctiva and sclera clear  NECK: Supple,   CHEST/LUNG: Clear to auscultation bilaterally; No wheeze  HEART: Regular rate and rhythm; No murmurs, rubs, or gallops  ABDOMEN: Soft, Nontender, Nondistended; Bowel sounds present  EXTREMITIES:  2+ Peripheral Pulses, No clubbing, cyanosis, or edema  PSYCH: calm,   NEUROLOGY: non-focal, alert awake   SKIN: No rashes or lesions    LABS:          no new labs           RADIOLOGY & ADDITIONAL TESTS:    Imaging Personally Reviewed:    Consultant(s) Notes Reviewed:      Care Discussed with Consultants/Other Providers:

## 2021-11-15 NOTE — PROGRESS NOTE ADULT - PROBLEM SELECTOR PROBLEM 4
BPH without urinary obstruction

## 2021-11-15 NOTE — DIETITIAN INITIAL EVALUATION ADULT. - PROBLEM SELECTOR PLAN 7
Pt on Keppra 500 mg bid at NH. Possibly in setting of seizures, though indication unclear.  - C/w IV Keppra 500 mg bid  - Aspiration precautions, seizure precautions

## 2021-11-15 NOTE — DIETITIAN INITIAL EVALUATION ADULT. - PERTINENT MEDS FT
MEDICATIONS  (STANDING):  BACItracin   Ointment 1 Application(s) Topical two times a day  budesonide  80 MICROgram(s)/formoterol 4.5 MICROgram(s) Inhaler 2 Puff(s) Inhalation two times a day  enoxaparin Injectable 40 milliGRAM(s) SubCutaneous daily  escitalopram 10 milliGRAM(s) Oral daily  levETIRAcetam 500 milliGRAM(s) Oral two times a day  montelukast 10 milliGRAM(s) Oral at bedtime  multivitamin 1 Tablet(s) Oral daily  pantoprazole    Tablet 40 milliGRAM(s) Oral before breakfast  tamsulosin 0.4 milliGRAM(s) Oral at bedtime  tiotropium 18 MICROgram(s) Capsule 1 Capsule(s) Inhalation daily

## 2021-11-15 NOTE — DIETITIAN INITIAL EVALUATION ADULT. - PROBLEM SELECTOR PLAN 3
Pt on Atenolol 25 mg q12hrs outpatient. BPs in acceptable range overnight.   -Hold Atenolol for now while strict NPO  -Patient w/ sinus tachycardia to 110s   -Will do IV Metoprolol 5 mg q6hrs PRN for HR > 110, hold SBP < 110  -Vitals q4hrs

## 2021-11-15 NOTE — PROGRESS NOTE ADULT - PROVIDER SPECIALTY LIST ADULT
Hospitalist
Pulmonology
Pulmonology
Hospitalist

## 2021-11-15 NOTE — DIETITIAN INITIAL EVALUATION ADULT. - OTHER INFO
74 y/o M, Montenegrin speaking, p/w SOB and hospital course significant for treatment of hypoxia and hypercapnia. Pt seen at lunch with 100% completion of the meal. As per PCA, pt ate 100% of breakfast as well. Pt states he has a great appetite. Denies any GI issues (nausea/vomiting/diarrhea/constipation.) 11/9 swallow assessment recommending minced and moist with mildly thickened liquids.    Pt appears thin with muscle/fat wasting; however, pt unable to state UBW and if he lost any weight.

## 2021-11-15 NOTE — DIETITIAN INITIAL EVALUATION ADULT. - PROBLEM SELECTOR PLAN 6
Patient was on Escitalopram 10 mg daily   -Patient w/ documented history of MDD  -Currently denying depression/anxiety and SI/HI  -Hold Escitalopram for now while strict NPO

## 2021-11-15 NOTE — PROGRESS NOTE ADULT - PROBLEM SELECTOR PLAN 6
Patient was on Escitalopram 10 mg daily   -Patient w/ documented history of MDD  -Currently denying depression/anxiety and SI/HI  -Hold Escitalopram for now while strict NPO
Stable.  -Patient w/ documented history of MDD  -Currently denying depression/anxiety and SI/HI  -Cont escitalopram 10 daily.
Patient was on Escitalopram 10 mg daily   -Patient w/ documented history of MDD  -Currently denying depression/anxiety and SI/HI  -Resume escitalopram 10 daily.
Stable.  -Patient w/ documented history of MDD  -Currently denying depression/anxiety and SI/HI  -Cont escitalopram 10 daily.
Stable.  -Patient w/ documented history of MDD  -Currently denying depression/anxiety and SI/HI  -Cont escitalopram 10 daily.
Patient was on Escitalopram 10 mg daily   -Patient w/ documented history of MDD  -Currently denying depression/anxiety and SI/HI  -Hold Escitalopram for now while strict NPO
Patient was on Escitalopram 10 mg daily   -Patient w/ documented history of MDD  -Currently denying depression/anxiety and SI/HI  -Resume escitalopram 10 daily.
Stable.  -Patient w/ documented history of MDD  -Currently denying depression/anxiety and SI/HI  -Cont escitalopram 10 daily.

## 2021-11-16 LAB
ANA PAT FLD IF-IMP: ABNORMAL
ANA TITR SER: ABNORMAL
AUTO DIFF PNL BLD: ABNORMAL
C-ANCA SER-ACNC: NEGATIVE — SIGNIFICANT CHANGE UP
P-ANCA SER-ACNC: NEGATIVE — SIGNIFICANT CHANGE UP

## 2021-11-30 LAB
EJ: NEGATIVE — SIGNIFICANT CHANGE UP
ENA JO1 AB SER IA-ACNC: <20 UNITS — SIGNIFICANT CHANGE UP
ENA PM/SCL AB SER-ACNC: <20 UNITS — SIGNIFICANT CHANGE UP
ENA SM+RNP AB SER IA-ACNC: <20 UNITS — SIGNIFICANT CHANGE UP
ENA SS-A IGG SER QL: <20 UNITS — SIGNIFICANT CHANGE UP
FIBRILLARIN AB SER QL: NEGATIVE — SIGNIFICANT CHANGE UP
KU AB SER QL: NEGATIVE — SIGNIFICANT CHANGE UP
MDA-5 (P140)(CADM-140): <20 UNITS — SIGNIFICANT CHANGE UP
MI2 AB SER QL: NEGATIVE — SIGNIFICANT CHANGE UP
NXP-2 (P140): <20 UNITS — SIGNIFICANT CHANGE UP
OJ AB SER QL: NEGATIVE — SIGNIFICANT CHANGE UP
PL12 AB SER QL: NEGATIVE — SIGNIFICANT CHANGE UP
PL7 AB SER QL: NEGATIVE — SIGNIFICANT CHANGE UP
SRP AB SERPL QL: NEGATIVE — SIGNIFICANT CHANGE UP
TIF GAMMA (P155/140): <20 UNITS — SIGNIFICANT CHANGE UP
U2 SNRNP AB SER QL: NEGATIVE — SIGNIFICANT CHANGE UP

## 2022-03-25 ENCOUNTER — INPATIENT (INPATIENT)
Facility: HOSPITAL | Age: 77
LOS: 4 days | Discharge: NURSING, REHAB OR LONG-TERM CARE | DRG: 196 | End: 2022-03-30
Attending: STUDENT IN AN ORGANIZED HEALTH CARE EDUCATION/TRAINING PROGRAM | Admitting: STUDENT IN AN ORGANIZED HEALTH CARE EDUCATION/TRAINING PROGRAM
Payer: MEDICARE

## 2022-03-25 VITALS
RESPIRATION RATE: 50 BRPM | OXYGEN SATURATION: 85 % | DIASTOLIC BLOOD PRESSURE: 65 MMHG | HEIGHT: 65 IN | SYSTOLIC BLOOD PRESSURE: 105 MMHG | TEMPERATURE: 98 F | HEART RATE: 100 BPM

## 2022-03-25 LAB
ALBUMIN SERPL ELPH-MCNC: 3.8 G/DL — SIGNIFICANT CHANGE UP (ref 3.3–5.2)
ALP SERPL-CCNC: 67 U/L — SIGNIFICANT CHANGE UP (ref 40–120)
ALT FLD-CCNC: 9 U/L — SIGNIFICANT CHANGE UP
ANION GAP SERPL CALC-SCNC: 15 MMOL/L — SIGNIFICANT CHANGE UP (ref 5–17)
AST SERPL-CCNC: 18 U/L — SIGNIFICANT CHANGE UP
BASOPHILS # BLD AUTO: 0.03 K/UL — SIGNIFICANT CHANGE UP (ref 0–0.2)
BASOPHILS NFR BLD AUTO: 0.2 % — SIGNIFICANT CHANGE UP (ref 0–2)
BILIRUB SERPL-MCNC: 0.5 MG/DL — SIGNIFICANT CHANGE UP (ref 0.4–2)
BUN SERPL-MCNC: 14.6 MG/DL — SIGNIFICANT CHANGE UP (ref 8–20)
CALCIUM SERPL-MCNC: 9.1 MG/DL — SIGNIFICANT CHANGE UP (ref 8.6–10.2)
CHLORIDE SERPL-SCNC: 100 MMOL/L — SIGNIFICANT CHANGE UP (ref 98–107)
CK SERPL-CCNC: 59 U/L — SIGNIFICANT CHANGE UP (ref 30–200)
CO2 SERPL-SCNC: 26 MMOL/L — SIGNIFICANT CHANGE UP (ref 22–29)
CREAT SERPL-MCNC: 0.78 MG/DL — SIGNIFICANT CHANGE UP (ref 0.5–1.3)
EGFR: 93 ML/MIN/1.73M2 — SIGNIFICANT CHANGE UP
EOSINOPHIL # BLD AUTO: 0.21 K/UL — SIGNIFICANT CHANGE UP (ref 0–0.5)
EOSINOPHIL NFR BLD AUTO: 1.6 % — SIGNIFICANT CHANGE UP (ref 0–6)
GLUCOSE SERPL-MCNC: 144 MG/DL — HIGH (ref 70–99)
HCT VFR BLD CALC: 40.9 % — SIGNIFICANT CHANGE UP (ref 39–50)
HGB BLD-MCNC: 13.2 G/DL — SIGNIFICANT CHANGE UP (ref 13–17)
IMM GRANULOCYTES NFR BLD AUTO: 0.5 % — SIGNIFICANT CHANGE UP (ref 0–1.5)
LYMPHOCYTES # BLD AUTO: 1.09 K/UL — SIGNIFICANT CHANGE UP (ref 1–3.3)
LYMPHOCYTES # BLD AUTO: 8.4 % — LOW (ref 13–44)
MCHC RBC-ENTMCNC: 29.6 PG — SIGNIFICANT CHANGE UP (ref 27–34)
MCHC RBC-ENTMCNC: 32.3 GM/DL — SIGNIFICANT CHANGE UP (ref 32–36)
MCV RBC AUTO: 91.7 FL — SIGNIFICANT CHANGE UP (ref 80–100)
MONOCYTES # BLD AUTO: 1.05 K/UL — HIGH (ref 0–0.9)
MONOCYTES NFR BLD AUTO: 8 % — SIGNIFICANT CHANGE UP (ref 2–14)
NEUTROPHILS # BLD AUTO: 10.61 K/UL — HIGH (ref 1.8–7.4)
NEUTROPHILS NFR BLD AUTO: 81.3 % — HIGH (ref 43–77)
NT-PROBNP SERPL-SCNC: 497 PG/ML — HIGH (ref 0–300)
PLATELET # BLD AUTO: 189 K/UL — SIGNIFICANT CHANGE UP (ref 150–400)
POTASSIUM SERPL-MCNC: 4.5 MMOL/L — SIGNIFICANT CHANGE UP (ref 3.5–5.3)
POTASSIUM SERPL-SCNC: 4.5 MMOL/L — SIGNIFICANT CHANGE UP (ref 3.5–5.3)
PROT SERPL-MCNC: 7.5 G/DL — SIGNIFICANT CHANGE UP (ref 6.6–8.7)
RBC # BLD: 4.46 M/UL — SIGNIFICANT CHANGE UP (ref 4.2–5.8)
RBC # FLD: 12.7 % — SIGNIFICANT CHANGE UP (ref 10.3–14.5)
SODIUM SERPL-SCNC: 141 MMOL/L — SIGNIFICANT CHANGE UP (ref 135–145)
TROPONIN T SERPL-MCNC: <0.01 NG/ML — SIGNIFICANT CHANGE UP (ref 0–0.06)
WBC # BLD: 13.05 K/UL — HIGH (ref 3.8–10.5)
WBC # FLD AUTO: 13.05 K/UL — HIGH (ref 3.8–10.5)

## 2022-03-25 PROCEDURE — 71045 X-RAY EXAM CHEST 1 VIEW: CPT | Mod: 26

## 2022-03-25 PROCEDURE — 99291 CRITICAL CARE FIRST HOUR: CPT

## 2022-03-25 RX ORDER — ALBUTEROL 90 UG/1
2.5 AEROSOL, METERED ORAL ONCE
Refills: 0 | Status: COMPLETED | OUTPATIENT
Start: 2022-03-25 | End: 2022-03-25

## 2022-03-25 RX ORDER — MAGNESIUM SULFATE 500 MG/ML
2 VIAL (ML) INJECTION ONCE
Refills: 0 | Status: COMPLETED | OUTPATIENT
Start: 2022-03-25 | End: 2022-03-25

## 2022-03-25 RX ORDER — SODIUM CHLORIDE 9 MG/ML
3 INJECTION INTRAMUSCULAR; INTRAVENOUS; SUBCUTANEOUS ONCE
Refills: 0 | Status: COMPLETED | OUTPATIENT
Start: 2022-03-25 | End: 2022-03-25

## 2022-03-25 RX ORDER — PIPERACILLIN AND TAZOBACTAM 4; .5 G/20ML; G/20ML
3.38 INJECTION, POWDER, LYOPHILIZED, FOR SOLUTION INTRAVENOUS ONCE
Refills: 0 | Status: COMPLETED | OUTPATIENT
Start: 2022-03-25 | End: 2022-03-25

## 2022-03-25 RX ORDER — IPRATROPIUM/ALBUTEROL SULFATE 18-103MCG
3 AEROSOL WITH ADAPTER (GRAM) INHALATION ONCE
Refills: 0 | Status: COMPLETED | OUTPATIENT
Start: 2022-03-25 | End: 2022-03-25

## 2022-03-25 RX ADMIN — Medication 150 GRAM(S): at 23:10

## 2022-03-25 RX ADMIN — Medication 3 MILLILITER(S): at 23:11

## 2022-03-25 RX ADMIN — Medication 125 MILLIGRAM(S): at 23:10

## 2022-03-25 RX ADMIN — ALBUTEROL 2.5 MILLIGRAM(S): 90 AEROSOL, METERED ORAL at 23:11

## 2022-03-25 RX ADMIN — SODIUM CHLORIDE 3 MILLILITER(S): 9 INJECTION INTRAMUSCULAR; INTRAVENOUS; SUBCUTANEOUS at 23:10

## 2022-03-25 NOTE — ED PROVIDER NOTE - NSICDXPASTMEDICALHX_GEN_ALL_CORE_FT
PAST MEDICAL HISTORY:  Benign prostate hyperplasia     BPH (benign prostatic hypertrophy)     COPD with hypoxia     Depression, major     GERD (gastroesophageal reflux disease)     HTN (hypertension)     Hypertension     Hypothyroid

## 2022-03-25 NOTE — ED ADULT TRIAGE NOTE - CHIEF COMPLAINT QUOTE
Pt. BIBA for SOB. Pt. is form [facility], supposed to be on oxygen and wasn't. Pt. states it just started. Pt. extremely tachypneic, unable to complete sentences. Pt. RA sat 85%. imporved to 93% on 4LNC. Denies chest pain. hx of COPD, pulmonary fibrosis.  Pt. brought to CC, MD Mota at bedside.

## 2022-03-25 NOTE — ED PROVIDER NOTE - OBJECTIVE STATEMENT
75 y/o male with PMHx of HTN, COPD, hypothyroid, pulmonary fibrosis, presents to the ED with SOB. Pt from nursing home, is supposed to be on O2 but has not been on it there.

## 2022-03-25 NOTE — ED PROVIDER NOTE - CRITICAL CARE ATTENDING CONTRIBUTION TO CARE
I have personally provided 52___ minutes of critical care time exclusive of time spent on separately billable procedures. Time includes review of laboratory data, radiology results, discussion with consultants, and monitoring for potential decompensation. Interventions were performed as documented above

## 2022-03-26 DIAGNOSIS — J96.01 ACUTE RESPIRATORY FAILURE WITH HYPOXIA: ICD-10-CM

## 2022-03-26 DIAGNOSIS — J44.1 CHRONIC OBSTRUCTIVE PULMONARY DISEASE WITH (ACUTE) EXACERBATION: ICD-10-CM

## 2022-03-26 DIAGNOSIS — J84.9 INTERSTITIAL PULMONARY DISEASE, UNSPECIFIED: ICD-10-CM

## 2022-03-26 PROBLEM — K21.9 GASTRO-ESOPHAGEAL REFLUX DISEASE WITHOUT ESOPHAGITIS: Chronic | Status: ACTIVE | Noted: 2021-11-08

## 2022-03-26 PROBLEM — N40.0 BENIGN PROSTATIC HYPERPLASIA WITHOUT LOWER URINARY TRACT SYMPTOMS: Chronic | Status: ACTIVE | Noted: 2021-11-08

## 2022-03-26 PROBLEM — F32.9 MAJOR DEPRESSIVE DISORDER, SINGLE EPISODE, UNSPECIFIED: Chronic | Status: ACTIVE | Noted: 2021-11-08

## 2022-03-26 PROBLEM — I10 ESSENTIAL (PRIMARY) HYPERTENSION: Chronic | Status: ACTIVE | Noted: 2021-11-08

## 2022-03-26 LAB
BASE EXCESS BLDA CALC-SCNC: 4.9 MMOL/L — HIGH (ref -2–3)
BLOOD GAS COMMENTS ARTERIAL: SIGNIFICANT CHANGE UP
FLUAV AG NPH QL: SIGNIFICANT CHANGE UP
FLUBV AG NPH QL: SIGNIFICANT CHANGE UP
HCO3 BLDA-SCNC: 29 MMOL/L — HIGH (ref 21–28)
HOROWITZ INDEX BLDA+IHG-RTO: SIGNIFICANT CHANGE UP
PCO2 BLDA: 44 MMHG — SIGNIFICANT CHANGE UP (ref 35–48)
PH BLDA: 7.43 — SIGNIFICANT CHANGE UP (ref 7.35–7.45)
PO2 BLDA: 149 MMHG — HIGH (ref 83–108)
RSV RNA NPH QL NAA+NON-PROBE: SIGNIFICANT CHANGE UP
SAO2 % BLDA: 100 % — HIGH (ref 94–98)
SARS-COV-2 RNA SPEC QL NAA+PROBE: SIGNIFICANT CHANGE UP

## 2022-03-26 PROCEDURE — 71250 CT THORAX DX C-: CPT | Mod: 26

## 2022-03-26 PROCEDURE — 12345: CPT | Mod: NC

## 2022-03-26 PROCEDURE — 99222 1ST HOSP IP/OBS MODERATE 55: CPT

## 2022-03-26 PROCEDURE — 99223 1ST HOSP IP/OBS HIGH 75: CPT | Mod: 25

## 2022-03-26 PROCEDURE — 99497 ADVNCD CARE PLAN 30 MIN: CPT | Mod: 25

## 2022-03-26 RX ORDER — ESCITALOPRAM OXALATE 10 MG/1
10 TABLET, FILM COATED ORAL DAILY
Refills: 0 | Status: DISCONTINUED | OUTPATIENT
Start: 2022-03-26 | End: 2022-03-30

## 2022-03-26 RX ORDER — ACETAMINOPHEN 500 MG
650 TABLET ORAL EVERY 6 HOURS
Refills: 0 | Status: DISCONTINUED | OUTPATIENT
Start: 2022-03-26 | End: 2022-03-30

## 2022-03-26 RX ORDER — FAMOTIDINE 10 MG/ML
20 INJECTION INTRAVENOUS
Refills: 0 | Status: DISCONTINUED | OUTPATIENT
Start: 2022-03-26 | End: 2022-03-30

## 2022-03-26 RX ORDER — IPRATROPIUM/ALBUTEROL SULFATE 18-103MCG
3 AEROSOL WITH ADAPTER (GRAM) INHALATION EVERY 6 HOURS
Refills: 0 | Status: DISCONTINUED | OUTPATIENT
Start: 2022-03-26 | End: 2022-03-26

## 2022-03-26 RX ORDER — TAMSULOSIN HYDROCHLORIDE 0.4 MG/1
0.4 CAPSULE ORAL AT BEDTIME
Refills: 0 | Status: DISCONTINUED | OUTPATIENT
Start: 2022-03-26 | End: 2022-03-30

## 2022-03-26 RX ORDER — LEVETIRACETAM 250 MG/1
500 TABLET, FILM COATED ORAL
Refills: 0 | Status: DISCONTINUED | OUTPATIENT
Start: 2022-03-26 | End: 2022-03-30

## 2022-03-26 RX ORDER — MONTELUKAST 4 MG/1
10 TABLET, CHEWABLE ORAL DAILY
Refills: 0 | Status: DISCONTINUED | OUTPATIENT
Start: 2022-03-26 | End: 2022-03-30

## 2022-03-26 RX ORDER — ENOXAPARIN SODIUM 100 MG/ML
30 INJECTION SUBCUTANEOUS EVERY 24 HOURS
Refills: 0 | Status: DISCONTINUED | OUTPATIENT
Start: 2022-03-26 | End: 2022-03-30

## 2022-03-26 RX ORDER — ALBUTEROL 90 UG/1
2.5 AEROSOL, METERED ORAL EVERY 6 HOURS
Refills: 0 | Status: DISCONTINUED | OUTPATIENT
Start: 2022-03-26 | End: 2022-03-30

## 2022-03-26 RX ORDER — ACETYLCYSTEINE 200 MG/ML
4 VIAL (ML) MISCELLANEOUS EVERY 8 HOURS
Refills: 0 | Status: COMPLETED | OUTPATIENT
Start: 2022-03-26 | End: 2022-03-26

## 2022-03-26 RX ORDER — AZITHROMYCIN 500 MG/1
500 TABLET, FILM COATED ORAL DAILY
Refills: 0 | Status: DISCONTINUED | OUTPATIENT
Start: 2022-03-26 | End: 2022-03-26

## 2022-03-26 RX ADMIN — Medication 4 MILLILITER(S): at 23:17

## 2022-03-26 RX ADMIN — FAMOTIDINE 20 MILLIGRAM(S): 10 INJECTION INTRAVENOUS at 17:51

## 2022-03-26 RX ADMIN — MONTELUKAST 10 MILLIGRAM(S): 4 TABLET, CHEWABLE ORAL at 11:47

## 2022-03-26 RX ADMIN — Medication 3 MILLILITER(S): at 10:06

## 2022-03-26 RX ADMIN — TAMSULOSIN HYDROCHLORIDE 0.4 MILLIGRAM(S): 0.4 CAPSULE ORAL at 21:47

## 2022-03-26 RX ADMIN — Medication 4 MILLILITER(S): at 16:11

## 2022-03-26 RX ADMIN — Medication 4 MILLILITER(S): at 10:05

## 2022-03-26 RX ADMIN — AZITHROMYCIN 500 MILLIGRAM(S): 500 TABLET, FILM COATED ORAL at 11:47

## 2022-03-26 RX ADMIN — FAMOTIDINE 20 MILLIGRAM(S): 10 INJECTION INTRAVENOUS at 05:01

## 2022-03-26 RX ADMIN — ENOXAPARIN SODIUM 30 MILLIGRAM(S): 100 INJECTION SUBCUTANEOUS at 17:50

## 2022-03-26 RX ADMIN — LEVETIRACETAM 500 MILLIGRAM(S): 250 TABLET, FILM COATED ORAL at 05:01

## 2022-03-26 RX ADMIN — Medication 3 MILLILITER(S): at 04:03

## 2022-03-26 RX ADMIN — ESCITALOPRAM OXALATE 10 MILLIGRAM(S): 10 TABLET, FILM COATED ORAL at 11:47

## 2022-03-26 RX ADMIN — Medication 40 MILLIGRAM(S): at 23:17

## 2022-03-26 RX ADMIN — Medication 100 MILLIGRAM(S): at 17:51

## 2022-03-26 RX ADMIN — Medication 40 MILLIGRAM(S): at 05:01

## 2022-03-26 RX ADMIN — Medication 40 MILLIGRAM(S): at 11:47

## 2022-03-26 RX ADMIN — PIPERACILLIN AND TAZOBACTAM 200 GRAM(S): 4; .5 INJECTION, POWDER, LYOPHILIZED, FOR SOLUTION INTRAVENOUS at 00:16

## 2022-03-26 RX ADMIN — Medication 40 MILLIGRAM(S): at 17:51

## 2022-03-26 RX ADMIN — ALBUTEROL 2.5 MILLIGRAM(S): 90 AEROSOL, METERED ORAL at 16:11

## 2022-03-26 RX ADMIN — LEVETIRACETAM 500 MILLIGRAM(S): 250 TABLET, FILM COATED ORAL at 17:51

## 2022-03-26 RX ADMIN — Medication 1 TABLET(S): at 11:47

## 2022-03-26 NOTE — H&P ADULT - CONVERSATION DETAILS
Patient reports that the NH does not have the right information. Candace Cao RN reported over the phone that the pt is DNR/DNI. When discussed with the patient and inhouse [place] int he stated that the time his last directives were taken he was very sick. His daughters were not available so he designated his niece to make decisions. He is upset that she did not consult with him and that it currently was stated that he is a DNR/DNI he reports he wants to be a full code and wants all aggressive measures to be performed. He also reports that if something is to happen to him then the next of kin would be his two daughters not his niece. D/w him at length in regards to the progression of his ILD his increased o2 demand and that the disease will continue to progress and his quality of life will continue to be compromised. He understands all of this and wants to continue with all medical management.

## 2022-03-26 NOTE — H&P ADULT - HISTORY OF PRESENT ILLNESS
Beninese interpretors- pacific int ID # [id number] Beninese     75 yo male w/ hx of ILD (on 4L NC home O2), COPD, HTN, Depression, BPH, GERD, seizures who was sent from [facility] for worsening hypoxia.  Per ED and triage notes the pt was not using o2 at the NH, but per the NH the pt was on his 4l nc and became hypoxic with increased WOB. Pt reports increased phlegm production than baseline and worsening sob. He states the phlegm remains whitish in color. He reports compliance with his using his o2 but always feels as if he is not getting enough air. Also states he feels he has been declining, unable to do minimal activity at times bc of his worsening sob. Per NH the pt has not been sick recently and was only on inhalers at the site not [facility].  Denies any sick contact, fevers, chills, chest pain, abd pain, N/V, diarrhea, dysuria, trauma or any other complaints.

## 2022-03-26 NOTE — H&P ADULT - ASSESSMENT
77 yo male w/ hx of ILD (on 4L NC home O2), COPD, HTN, Depression, BPH, GERD, seizures who was sent from [facility] for worsening hypoxia.  Per ED and triage notes the pt was not using o2 at the NH, but per the NH the pt was on his 4l nc and became hypoxic with increased WOB. Pt reports increased phlegm production than baseline and worsening sob. CXR noted with progression of ILD, unable to rule out infectious process so CT chest ordered. Pt admitted with acute on chronic resp failure with hypoxia 2/2 ILD exacerbation.           Acute on chronic respiratory failure with hypoxia 2/2 progressive ILD and currently in ILD exacerbation  - underlying hx COPD former smoker   - currently with tachypnea, per ED record and triage pt was not using o2 at the NH. Per NH the pt was hypoxic with baseline 4l nc and that was why he was transferred to [facility]  - leukocytosis 13 (but pt received steroids) , reports increased whitish phlegm production   - afebrile   - f/u ABG stat     - cxr noted preliminary reading RML with noted increased density possibly from progression of ILD  - will perform ct chest for further evaluation as hard to decipher as pt has extensive pulm fibrosis   -s/p solumedrol 125mg x 1 dose and duoneb x 1 dose  -c/w solumedrol 40mg IV q6hrs  -c/w duoneb q6hrs    c/w mucomyst x 3 doses   -c/w robitussin prn   -c/w tessalon pearls prn   - will empirically tx with azitrhomycin   -c/w supplemental o2 4l via NC pt is saturating 92-95%   - pulm consulted     HTN   -sbp currently 100 range  - low/normal bp  - holding Atenolol 25 mg daily for now and monitor bp. If bp improves then will restart.     Seizures   Stable  -Cont Keppra 500mg po bid  - f/u keppra level   -c/w Aspiration precautions, seizure precautions     BPH without urinary obstruction    No S/S of urinary retention  -Cont flomax po qhs      Depression   Stable  -Cont escitalopram 10mg po qd     GERD    -c/w pepcid po bid     Severe protein calorie malnutrition  - the pt is on pureed modified diet with thickened liquids  - nutrition consulted     DVT Prophylaxis  -c/w  Lovenox sq qd    Advanced directives: Per [facility] pt has a MOLST/ form that states he is DNR/DNI, unable to find paper work in the ED and called to obtain all hx/ meds from the NH. Requested fax for the NH paper work.       Dispo: Pt resides in [facility] currently remains medically acute requiring inpt management.    77 yo male w/ hx of ILD (on 4L NC home O2), COPD, HTN, Depression, BPH, GERD, seizures who was sent from [facility] for worsening hypoxia.  Per ED and triage notes the pt was not using o2 at the NH, but per the NH the pt was on his 4l nc and became hypoxic with increased WOB. Pt reports increased phlegm production than baseline and worsening sob. CXR noted with progression of ILD, unable to rule out infectious process so CT chest ordered. Pt admitted with acute on chronic resp failure with hypoxia 2/2 ILD exacerbation.     Admit to telemetry         Acute on chronic respiratory failure with hypoxia 2/2 progressive ILD and currently in ILD exacerbation  - underlying hx COPD former smoker   - currently with tachypnea, per ED record and triage pt was not using o2 at the NH. Per NH the pt was hypoxic with baseline 4l nc and that was why he was transferred to [facility]  - leukocytosis 13 (but pt received steroids) , reports increased whitish phlegm production   - afebrile   - f/u ABG stat     - cxr noted preliminary reading RML with noted increased density possibly from progression of ILD  - will perform ct chest for further evaluation as hard to decipher as pt has extensive pulm fibrosis   -s/p solumedrol 125mg x 1 dose and duoneb x 1 dose  -c/w solumedrol 40mg IV q6hrs  -c/w duoneb q6hrs    c/w mucomyst x 3 doses   -c/w robitussin prn   -c/w tessalon pearls prn   - will empirically tx with azitrhomycin   -c/w supplemental o2 4l via NC pt is saturating 92-95%   - pulm consulted     HTN   -sbp currently 100 range  - low/normal bp  - holding Atenolol 25 mg daily for now and monitor bp. If bp improves then will restart.     Seizures   Stable  -Cont Keppra 500mg po bid  - f/u keppra level   -c/w Aspiration precautions, seizure precautions     BPH without urinary obstruction    No S/S of urinary retention  -Cont flomax po qhs      Depression   Stable  -Cont escitalopram 10mg po qd     GERD    -c/w pepcid po bid     Severe protein calorie malnutrition  - the pt is on pureed modified diet with thickened liquids  - nutrition consulted     DVT Prophylaxis  -c/w  Lovenox sq qd    Advanced directives: Per [facility] pt has a MOLST/ form that states he is DNR/DNI, paper chart does not have any paper work from [facility]. Called NH and requested fax back with paper work. NH read me the pts med list for now. Pt is AAOX3 d/w him with inhouse  at bedside and he said that he was sick and the only family member available was his niece and she must have filled out the paper for him. At this time he reports he wants to be a full code would want all aggressive measures. He also made it clear that if something happens to him his next of kin are his two daughters not his niece. Adonisromain and Viri Bryant (daughters). Refer to [facility]  Palliative care consulted as pt has progressively worsening ILD and would benefit from further advanced directive discussion and family involvement       Dispo: Pt resides in [facility] currently remains medically acute requiring inpt management.

## 2022-03-26 NOTE — H&P ADULT - NSICDXPASTMEDICALHX_GEN_ALL_CORE_FT
PAST MEDICAL HISTORY:  Benign prostate hyperplasia     BPH (benign prostatic hypertrophy)     Depression, major     GERD (gastroesophageal reflux disease)     H/O interstitial lung disease on 4l via nc    HTN (hypertension)     Hypertension

## 2022-03-26 NOTE — H&P ADULT - CONVERSATION/DISCUSSION
Diagnosis/Prognosis/MOLST Discussed/Treatment Options Diagnosis/Prognosis/MOLST Discussed/Treatment Options/Palliative Care Referral

## 2022-03-26 NOTE — CONSULT NOTE ADULT - ASSESSMENT
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  Currently oxygenating well on 4 L  PA mildly enlarged on CT  minimal BNP  Would treat as ILD exacerbation  check stat D dimer, CTA if elevated  D/c nebs  medrol  speech eval  abx  02  Palliative 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

## 2022-03-27 LAB
ALBUMIN SERPL ELPH-MCNC: 3.5 G/DL — SIGNIFICANT CHANGE UP (ref 3.3–5.2)
ALP SERPL-CCNC: 60 U/L — SIGNIFICANT CHANGE UP (ref 40–120)
ALT FLD-CCNC: 7 U/L — SIGNIFICANT CHANGE UP
ANION GAP SERPL CALC-SCNC: 10 MMOL/L — SIGNIFICANT CHANGE UP (ref 5–17)
AST SERPL-CCNC: 13 U/L — SIGNIFICANT CHANGE UP
BASOPHILS # BLD AUTO: 0.01 K/UL — SIGNIFICANT CHANGE UP (ref 0–0.2)
BASOPHILS NFR BLD AUTO: 0.1 % — SIGNIFICANT CHANGE UP (ref 0–2)
BILIRUB SERPL-MCNC: 0.4 MG/DL — SIGNIFICANT CHANGE UP (ref 0.4–2)
BUN SERPL-MCNC: 18.1 MG/DL — SIGNIFICANT CHANGE UP (ref 8–20)
CALCIUM SERPL-MCNC: 8.7 MG/DL — SIGNIFICANT CHANGE UP (ref 8.6–10.2)
CHLORIDE SERPL-SCNC: 101 MMOL/L — SIGNIFICANT CHANGE UP (ref 98–107)
CO2 SERPL-SCNC: 29 MMOL/L — SIGNIFICANT CHANGE UP (ref 22–29)
CREAT SERPL-MCNC: 0.69 MG/DL — SIGNIFICANT CHANGE UP (ref 0.5–1.3)
D DIMER BLD IA.RAPID-MCNC: <150 NG/ML DDU — SIGNIFICANT CHANGE UP
EGFR: 97 ML/MIN/1.73M2 — SIGNIFICANT CHANGE UP
EOSINOPHIL # BLD AUTO: 0 K/UL — SIGNIFICANT CHANGE UP (ref 0–0.5)
EOSINOPHIL NFR BLD AUTO: 0 % — SIGNIFICANT CHANGE UP (ref 0–6)
GLUCOSE SERPL-MCNC: 143 MG/DL — HIGH (ref 70–99)
HCT VFR BLD CALC: 37.8 % — LOW (ref 39–50)
HGB BLD-MCNC: 12.3 G/DL — LOW (ref 13–17)
IMM GRANULOCYTES NFR BLD AUTO: 0.5 % — SIGNIFICANT CHANGE UP (ref 0–1.5)
LYMPHOCYTES # BLD AUTO: 0.7 K/UL — LOW (ref 1–3.3)
LYMPHOCYTES # BLD AUTO: 5.7 % — LOW (ref 13–44)
MAGNESIUM SERPL-MCNC: 2.2 MG/DL — SIGNIFICANT CHANGE UP (ref 1.8–2.6)
MCHC RBC-ENTMCNC: 30.2 PG — SIGNIFICANT CHANGE UP (ref 27–34)
MCHC RBC-ENTMCNC: 32.5 GM/DL — SIGNIFICANT CHANGE UP (ref 32–36)
MCV RBC AUTO: 92.9 FL — SIGNIFICANT CHANGE UP (ref 80–100)
MONOCYTES # BLD AUTO: 0.43 K/UL — SIGNIFICANT CHANGE UP (ref 0–0.9)
MONOCYTES NFR BLD AUTO: 3.5 % — SIGNIFICANT CHANGE UP (ref 2–14)
NEUTROPHILS # BLD AUTO: 11.14 K/UL — HIGH (ref 1.8–7.4)
NEUTROPHILS NFR BLD AUTO: 90.2 % — HIGH (ref 43–77)
PLATELET # BLD AUTO: 177 K/UL — SIGNIFICANT CHANGE UP (ref 150–400)
POTASSIUM SERPL-MCNC: 4.7 MMOL/L — SIGNIFICANT CHANGE UP (ref 3.5–5.3)
POTASSIUM SERPL-SCNC: 4.7 MMOL/L — SIGNIFICANT CHANGE UP (ref 3.5–5.3)
PROT SERPL-MCNC: 7 G/DL — SIGNIFICANT CHANGE UP (ref 6.6–8.7)
RBC # BLD: 4.07 M/UL — LOW (ref 4.2–5.8)
RBC # FLD: 12.6 % — SIGNIFICANT CHANGE UP (ref 10.3–14.5)
SODIUM SERPL-SCNC: 140 MMOL/L — SIGNIFICANT CHANGE UP (ref 135–145)
WBC # BLD: 12.34 K/UL — HIGH (ref 3.8–10.5)
WBC # FLD AUTO: 12.34 K/UL — HIGH (ref 3.8–10.5)

## 2022-03-27 PROCEDURE — 99233 SBSQ HOSP IP/OBS HIGH 50: CPT

## 2022-03-27 RX ORDER — POLYETHYLENE GLYCOL 3350 17 G/17G
17 POWDER, FOR SOLUTION ORAL
Refills: 0 | Status: DISCONTINUED | OUTPATIENT
Start: 2022-03-27 | End: 2022-03-30

## 2022-03-27 RX ADMIN — MONTELUKAST 10 MILLIGRAM(S): 4 TABLET, CHEWABLE ORAL at 18:28

## 2022-03-27 RX ADMIN — Medication 40 MILLIGRAM(S): at 22:26

## 2022-03-27 RX ADMIN — ENOXAPARIN SODIUM 30 MILLIGRAM(S): 100 INJECTION SUBCUTANEOUS at 18:28

## 2022-03-27 RX ADMIN — FAMOTIDINE 20 MILLIGRAM(S): 10 INJECTION INTRAVENOUS at 05:02

## 2022-03-27 RX ADMIN — LEVETIRACETAM 500 MILLIGRAM(S): 250 TABLET, FILM COATED ORAL at 05:05

## 2022-03-27 RX ADMIN — TAMSULOSIN HYDROCHLORIDE 0.4 MILLIGRAM(S): 0.4 CAPSULE ORAL at 22:26

## 2022-03-27 RX ADMIN — FAMOTIDINE 20 MILLIGRAM(S): 10 INJECTION INTRAVENOUS at 18:27

## 2022-03-27 RX ADMIN — Medication 1 TABLET(S): at 18:27

## 2022-03-27 RX ADMIN — Medication 40 MILLIGRAM(S): at 13:15

## 2022-03-27 RX ADMIN — LEVETIRACETAM 500 MILLIGRAM(S): 250 TABLET, FILM COATED ORAL at 18:28

## 2022-03-27 RX ADMIN — ESCITALOPRAM OXALATE 10 MILLIGRAM(S): 10 TABLET, FILM COATED ORAL at 18:28

## 2022-03-27 RX ADMIN — Medication 100 MILLIGRAM(S): at 05:02

## 2022-03-27 RX ADMIN — Medication 40 MILLIGRAM(S): at 05:02

## 2022-03-28 ENCOUNTER — TRANSCRIPTION ENCOUNTER (OUTPATIENT)
Age: 77
End: 2022-03-28

## 2022-03-28 DIAGNOSIS — J96.21 ACUTE AND CHRONIC RESPIRATORY FAILURE WITH HYPOXIA: ICD-10-CM

## 2022-03-28 DIAGNOSIS — R06.02 SHORTNESS OF BREATH: ICD-10-CM

## 2022-03-28 DIAGNOSIS — I27.20 PULMONARY HYPERTENSION, UNSPECIFIED: ICD-10-CM

## 2022-03-28 PROBLEM — Z87.09 PERSONAL HISTORY OF OTHER DISEASES OF THE RESPIRATORY SYSTEM: Chronic | Status: ACTIVE | Noted: 2022-03-26

## 2022-03-28 LAB
ALBUMIN SERPL ELPH-MCNC: 3.5 G/DL — SIGNIFICANT CHANGE UP (ref 3.3–5.2)
ALP SERPL-CCNC: 56 U/L — SIGNIFICANT CHANGE UP (ref 40–120)
ALT FLD-CCNC: 7 U/L — SIGNIFICANT CHANGE UP
ANION GAP SERPL CALC-SCNC: 10 MMOL/L — SIGNIFICANT CHANGE UP (ref 5–17)
AST SERPL-CCNC: 13 U/L — SIGNIFICANT CHANGE UP
BILIRUB SERPL-MCNC: 0.3 MG/DL — LOW (ref 0.4–2)
BUN SERPL-MCNC: 28.5 MG/DL — HIGH (ref 8–20)
CALCIUM SERPL-MCNC: 8.7 MG/DL — SIGNIFICANT CHANGE UP (ref 8.6–10.2)
CHLORIDE SERPL-SCNC: 103 MMOL/L — SIGNIFICANT CHANGE UP (ref 98–107)
CO2 SERPL-SCNC: 29 MMOL/L — SIGNIFICANT CHANGE UP (ref 22–29)
CREAT SERPL-MCNC: 0.82 MG/DL — SIGNIFICANT CHANGE UP (ref 0.5–1.3)
EGFR: 92 ML/MIN/1.73M2 — SIGNIFICANT CHANGE UP
GLUCOSE SERPL-MCNC: 93 MG/DL — SIGNIFICANT CHANGE UP (ref 70–99)
HCT VFR BLD CALC: 39 % — SIGNIFICANT CHANGE UP (ref 39–50)
HGB BLD-MCNC: 12.2 G/DL — LOW (ref 13–17)
MCHC RBC-ENTMCNC: 30 PG — SIGNIFICANT CHANGE UP (ref 27–34)
MCHC RBC-ENTMCNC: 31.3 GM/DL — LOW (ref 32–36)
MCV RBC AUTO: 95.8 FL — SIGNIFICANT CHANGE UP (ref 80–100)
PLATELET # BLD AUTO: 181 K/UL — SIGNIFICANT CHANGE UP (ref 150–400)
POTASSIUM SERPL-MCNC: 4.9 MMOL/L — SIGNIFICANT CHANGE UP (ref 3.5–5.3)
POTASSIUM SERPL-SCNC: 4.9 MMOL/L — SIGNIFICANT CHANGE UP (ref 3.5–5.3)
PREALB SERPL-MCNC: 17 MG/DL — LOW (ref 18–38)
PROT SERPL-MCNC: 6.8 G/DL — SIGNIFICANT CHANGE UP (ref 6.6–8.7)
RBC # BLD: 4.07 M/UL — LOW (ref 4.2–5.8)
RBC # FLD: 12.8 % — SIGNIFICANT CHANGE UP (ref 10.3–14.5)
SARS-COV-2 RNA SPEC QL NAA+PROBE: SIGNIFICANT CHANGE UP
SODIUM SERPL-SCNC: 142 MMOL/L — SIGNIFICANT CHANGE UP (ref 135–145)
WBC # BLD: 15.7 K/UL — HIGH (ref 3.8–10.5)
WBC # FLD AUTO: 15.7 K/UL — HIGH (ref 3.8–10.5)

## 2022-03-28 PROCEDURE — 99233 SBSQ HOSP IP/OBS HIGH 50: CPT

## 2022-03-28 PROCEDURE — 99222 1ST HOSP IP/OBS MODERATE 55: CPT

## 2022-03-28 RX ADMIN — Medication 1 TABLET(S): at 12:02

## 2022-03-28 RX ADMIN — FAMOTIDINE 20 MILLIGRAM(S): 10 INJECTION INTRAVENOUS at 05:20

## 2022-03-28 RX ADMIN — Medication 40 MILLIGRAM(S): at 05:20

## 2022-03-28 RX ADMIN — Medication 100 MILLIGRAM(S): at 17:15

## 2022-03-28 RX ADMIN — LEVETIRACETAM 500 MILLIGRAM(S): 250 TABLET, FILM COATED ORAL at 05:20

## 2022-03-28 RX ADMIN — POLYETHYLENE GLYCOL 3350 17 GRAM(S): 17 POWDER, FOR SOLUTION ORAL at 17:32

## 2022-03-28 RX ADMIN — ALBUTEROL 2.5 MILLIGRAM(S): 90 AEROSOL, METERED ORAL at 23:00

## 2022-03-28 RX ADMIN — ENOXAPARIN SODIUM 30 MILLIGRAM(S): 100 INJECTION SUBCUTANEOUS at 21:41

## 2022-03-28 RX ADMIN — Medication 100 MILLIGRAM(S): at 05:21

## 2022-03-28 RX ADMIN — ESCITALOPRAM OXALATE 10 MILLIGRAM(S): 10 TABLET, FILM COATED ORAL at 12:02

## 2022-03-28 RX ADMIN — MONTELUKAST 10 MILLIGRAM(S): 4 TABLET, CHEWABLE ORAL at 12:02

## 2022-03-28 RX ADMIN — TAMSULOSIN HYDROCHLORIDE 0.4 MILLIGRAM(S): 0.4 CAPSULE ORAL at 21:41

## 2022-03-28 RX ADMIN — LEVETIRACETAM 500 MILLIGRAM(S): 250 TABLET, FILM COATED ORAL at 17:15

## 2022-03-28 RX ADMIN — Medication 40 MILLIGRAM(S): at 17:15

## 2022-03-28 RX ADMIN — FAMOTIDINE 20 MILLIGRAM(S): 10 INJECTION INTRAVENOUS at 17:15

## 2022-03-28 NOTE — CONSULT NOTE ADULT - TIME BILLING
greater than 50% of time spent reviewing labs, notes, orders and radiographs, coordinating care  discussed with (  ) and med team
D/W pt, hospitalist Dr. Holland, Palliative  Nory Perez  Chart review, meds, labs, imaging

## 2022-03-28 NOTE — CHART NOTE - NSCHARTNOTEFT_GEN_A_CORE
palliative care social work note.    SW and palliative care physician DR ferraro met with patient and utilized  to introduce palliative service and  address patient understanding of medical issues. patient states he does not follow with pulmonologist and does not have lung disease yet reports respiratory issues. Palliative care team explored support system with 2 daughters and niece and patient reports that niece local and 2 dgts are not . patient acknowledges he would want his niece contacted to make decisions on his behalf if needed. HCP form reviewed with patient in [place] and patient completed appointing his niece  Ania Bryant as HCP.

## 2022-03-28 NOTE — PATIENT PROFILE ADULT - CAREGIVER NAME
Spoke with patient , results given as written by Dr. Villalta  Pt verbalizes understadning and appreciates the call.  Thanks MA    Labs scheduled 12 weeks   Soumya Bryant

## 2022-03-28 NOTE — PHYSICAL THERAPY INITIAL EVALUATION ADULT - GAIT PATTERN USED, PT EVAL
decreased activity tolerance, decreased juventino step length, supervision for safety to re-direct to task

## 2022-03-28 NOTE — DISCHARGE NOTE NURSING/CASE MANAGEMENT/SOCIAL WORK - PATIENT PORTAL LINK FT
You can access the FollowMyHealth Patient Portal offered by [facility] by registering at the following website: [email or web address]. By joining [employer]’s FollowMyHealth portal, you will also be able to view your health information using other applications (apps) compatible with our system.

## 2022-03-28 NOTE — DISCHARGE NOTE NURSING/CASE MANAGEMENT/SOCIAL WORK - NSDCPEFALRISK_GEN_ALL_CORE
For information on Fall & Injury Prevention, visit: [email or web address] OR  [email or web address] OR  https://www.cdc.gov/steadi/patient.html

## 2022-03-28 NOTE — DISCHARGE NOTE NURSING/CASE MANAGEMENT/SOCIAL WORK - NSDCFUADDAPPT_GEN_ALL_CORE_FT
STAR:  Patient is from [facility]   Patient has a prescheduled appt with pulmonologist Dr Tl Nesbitt on 04/15/2022 at 10:30 AM . Phone [phone] . Address:  [address] [place] [address]  STAR:  Patient is from [facility] - TO RETURN TO [facility]   Patient has a prescheduled appt with pulmonologist Dr Tl Nesbitt on 04/15/2022 at 10:30 AM . Phone [phone] . Address:  [address] [place] [address]

## 2022-03-28 NOTE — CONSULT NOTE ADULT - ASSESSMENT
In my opinion, patient has capacity to make basic decisions about his health care including decision to be full code In addition he appears to comprehend the concept of designating a proxy wishing that his niece or daughters act in that capacity  Despite his limited medical knowledge the basic concepts regarding resuscitation were understood In addition a proxy does not have the right to override a decision by the patient A declaration of incompetency would  need a judicial determination  There are no notations indicating full code would be futile nor is there indication he is currently terminally ill.  No other recommendations.

## 2022-03-28 NOTE — CONSULT NOTE ADULT - SUBJECTIVE AND OBJECTIVE BOX
asked to evaluate medical decision making capacity in terms of DNR DNI and designation of proxy  FLORY from [facility] reportedly had patient DNR/ DNI  patient seen with hospital   retired  from [place] living with family on   He  vaguely explains he has "trouble breathing" Is unaware of a specific medical diagnosis  He denies any past psychiatric history He denies use of alcohol or drugs  He requests all measures to be taken in event his heart stops or he stops breathing "I want to live"  Mental Status Exam:  The patient appears stated age, fair hygiene and dressed appropriately.  The patient was calm, cooperative with the interview and maintained appropriate eye contact.  No psychomotor agitation or retardation noted.    The patient's speech was fluent, normal in tone, rate, and volume.  The patient's mood is "OK"  Affect is full, stable and appropriate.  The patient's thoughts are goal directed.  Denies any delusions or hallucinations. Denies any suicidal or homicidal ideation, intent, or plan.  Insight is fair.  Judgment is adequate for basic needs.. He is alert, oriented to name and situation not specific date or place  Impulse control has been fair on the unit. Fund of knowledge is limited  PAST MEDICAL & SURGICAL HISTORY:  Hypertension    BPH (benign prostatic hypertrophy)    HTN (hypertension)    Benign prostate hyperplasia    GERD (gastroesophageal reflux disease)    Depression, major    H/O interstitial lung disease  on 4l via nc    No significant past surgical history    MEDICATIONS  (STANDING):  doxycycline hyclate Capsule 100 milliGRAM(s) Oral every 12 hours  enoxaparin Injectable 30 milliGRAM(s) SubCutaneous every 24 hours  escitalopram 10 milliGRAM(s) Oral daily  famotidine    Tablet 20 milliGRAM(s) Oral two times a day  levETIRAcetam 500 milliGRAM(s) Oral two times a day  methylPREDNISolone sodium succinate Injectable 40 milliGRAM(s) IV Push every 8 hours  montelukast 10 milliGRAM(s) Oral daily  multivitamin 1 Tablet(s) Oral daily  polyethylene glycol 3350 17 Gram(s) Oral two times a day  tamsulosin 0.4 milliGRAM(s) Oral at bedtime    MEDICATIONS  (PRN):  acetaminophen     Tablet .. 650 milliGRAM(s) Oral every 6 hours PRN Temp greater or equal to 38C (100.4F), Mild Pain (1 - 3)  ALBUTerol    0.083% 2.5 milliGRAM(s) Nebulizer every 6 hours PRN Bronchospasm  benzonatate 100 milliGRAM(s) Oral every 8 hours PRN Cough  guaiFENesin Oral Liquid (Sugar-Free) 100 milliGRAM(s) Oral every 6 hours PRN Cough                        12.2   15.70 )-----------( 181      ( 28 Mar 2022 03:49 )             39.0     03-28    142  |  103  |  28.5<H>  ----------------------------<  93  4.9   |  29.0  |  0.82    Ca    8.7      28 Mar 2022 03:49  Mg     2.2     03-27    TPro  6.8  /  Alb  3.5  /  TBili  0.3<L>  /  DBili  x   /  AST  13  /  ALT  7   /  AlkPhos  56  03-28    LIVER FUNCTIONS - ( 28 Mar 2022 03:49 )  Alb: 3.5 g/dL / Pro: 6.8 g/dL / ALK PHOS: 56 U/L / ALT: 7 U/L / AST: 13 U/L / GGT: x         < from: CT Chest No Cont (03.26.22 @ 03:39) >  IMPRESSION:    Progressive fibrotic interstitial lung disease, similar to November 2021   and markedly increased since December 2017 at which time there was a   substantial groundglass component. Findings are suggestive of fibrotic   NSIP versus UIP pattern. Connective tissue disease associated ILD is a   consideration.    < end of copied text >              
PULMONARY CONSULT NOTE      PEPE SALGUEROALEXEY-[id number]    Patient is a 76y old  Male who presents with a chief complaint of SOB (26 Mar 2022 11:31)  75 yo male w/ hx of ILD (on 4L NC home O2), COPD, HTN, Depression, BPH, GERD, seizures who was sent from [facility] for worsening hypoxia.  Per ED and triage notes the pt was not using o2 at the NH, but per the NH the pt was on his 4l nc and became hypoxic with increased WOB. Pt reports increased phlegm production than baseline and worsening sob. He states the phlegm remains whitish in color. He reports compliance with his using his o2 but always feels as if he is not getting enough air. Also states he feels he has been declining, unable to do minimal activity at times bc of his worsening sob. Per NH the pt has not been sick recently and was only on inhalers at the site not [facility].  Denies any sick contact, fevers, chills, chest pain, abd pain, N/V, diarrhea, dysuria, trauma or any other complaints.         HISTORY OF PRESENT ILLNESS:  no CP, fevers  denies any acute dyspnea  poor appetite, wt loss per pt  not ambulatory for months per pt  MEDICATIONS  (STANDING):  acetylcysteine 10%  Inhalation 4 milliLiter(s) Inhalation every 8 hours  albuterol/ipratropium for Nebulization 3 milliLiter(s) Nebulizer every 6 hours  azithromycin   Tablet 500 milliGRAM(s) Oral daily  enoxaparin Injectable 30 milliGRAM(s) SubCutaneous every 24 hours  escitalopram 10 milliGRAM(s) Oral daily  famotidine    Tablet 20 milliGRAM(s) Oral two times a day  levETIRAcetam 500 milliGRAM(s) Oral two times a day  methylPREDNISolone sodium succinate Injectable 40 milliGRAM(s) IV Push every 6 hours  montelukast 10 milliGRAM(s) Oral daily  multivitamin 1 Tablet(s) Oral daily  tamsulosin 0.4 milliGRAM(s) Oral at bedtime      MEDICATIONS  (PRN):  acetaminophen     Tablet .. 650 milliGRAM(s) Oral every 6 hours PRN Temp greater or equal to 38C (100.4F), Mild Pain (1 - 3)  benzonatate 100 milliGRAM(s) Oral every 8 hours PRN Cough  guaiFENesin Oral Liquid (Sugar-Free) 100 milliGRAM(s) Oral every 6 hours PRN Cough      Allergies    No Known Allergies    Intolerances        PAST MEDICAL & SURGICAL HISTORY:  Hypertension    BPH (benign prostatic hypertrophy)    HTN (hypertension)    Benign prostate hyperplasia    GERD (gastroesophageal reflux disease)    Depression, major    H/O interstitial lung disease  on 4l via nc    No significant past surgical history    No significant past surgical history        FAMILY HISTORY:  No pertinent family history in first degree relatives        SOCIAL HISTORY  Smoking History:     REVIEW OF SYSTEMS:    CONSTITUTIONAL:  No fevers, chills, sweats    HEENT:  Eyes:  No diplopia or blurred vision. ENT:  No earache, sore throat or runny nose.    CARDIOVASCULAR:  No pressure, squeezing, tightness, or heaviness about the chest; no palpitations.    RESPIRATORY:  see HPI    GASTROINTESTINAL:  No abdominal pain, nausea, vomiting or diarrhea.    GENITOURINARY:  No dysuria, frequency or urgency.    NEUROLOGIC:  No paresthesias, fasciculations, seizures or weakness.    PSYCHIATRIC:  No disorder of thought or mood.    Vital Signs Last 24 Hrs  T(C): 36.1 (26 Mar 2022 11:45), Max: 36.7 (26 Mar 2022 08:39)  T(F): 97 (26 Mar 2022 11:45), Max: 98 (26 Mar 2022 08:39)  HR: 89 (26 Mar 2022 11:45) (77 - 100)  BP: 99/65 (26 Mar 2022 11:45) (99/65 - 110/68)  BP(mean): --  RR: 20 (26 Mar 2022 11:45) (20 - 50)  SpO2: 100% (26 Mar 2022 11:45) (85% - 100%)    PHYSICAL EXAMINATION:    GENERAL: The patient is cachetic looking in no apparent distress.     HEENT: Head is normocephalic and atraumatic. Extraocular muscles are intact. Mucous membranes are moist.     NECK: Supple.     LUNGS: moderate air entry bilat  without wheezing, rales, or rhonchi. Respirations unlabored    HEART: Regular rate and rhythm without murmur.    ABDOMEN: Soft, nontender, and nondistended.  No hepatosplenomegaly is noted.    EXTREMITIES: Without edema, mild clubbing    NEUROLOGIC: Grossly intact.      LABS:                        13.2   13.05 )-----------( 189      ( 25 Mar 2022 23:22 )             40.9     03-25    141  |  100  |  14.6  ----------------------------<  144<H>  4.5   |  26.0  |  0.78    Ca    9.1      25 Mar 2022 23:22    TPro  7.5  /  Alb  3.8  /  TBili  0.5  /  DBili  x   /  AST  18  /  ALT  9   /  AlkPhos  67  03-25        ABG - ( 26 Mar 2022 05:31 )  pH, Arterial: 7.430 pH, Blood: x     /  pCO2: 44    /  pO2: 149   / HCO3: 29    / Base Excess: 4.9   /  SaO2: 100.0             CARDIAC MARKERS ( 25 Mar 2022 23:22 )  x     / <0.01 ng/mL / 59 U/L / x     / x            Serum Pro-Brain Natriuretic Peptide: 497 pg/mL (03-25-22 @ 23:22)          MICROBIOLOGY:    RADIOLOGY & ADDITIONAL STUDIES:  Ct scans reviewed and compared
[facility] PALLIATIVE MEDICINE CONSULT    CC: Patient is a 76y old  Male who presents with a chief complaint of SOB (28 Mar 2022 11:15)      HPI:    Assistance by [place] language line  [place] (#[id number]).    Mr. Bryant is a 76 year old male with PMH of ILD on 4L O2NC, COPD, HTN, depression, BPH, GERD, seizures sent from [facility] for worsening hypoxia and increased work of breathing, ROS + dyspnea with exertion, +weakness and +dry chronic cough. Admitted for acute on chronic respiratory failure 2/2 ILD exacerbation. Pulmonary on board. Evaluated by psychiatry and deemed to have capacity to make decision for full code status and designating a HCP. Palliative consulted for support and ongoing goc.     Pt seen and examined in ER along with palliative SW.     Present Symptoms:     Dyspnea: Yes with exertion  Nausea/Vomiting:  No  Anxiety:   No  Depression:  No  Fatigue:  Yes  Loss of appetite: No  Constipation:  No    Pain: No            Character-            Duration-            Effect-            Factors-            Frequency-            Location-            Severity-    Pain AD Score:  [email or web address] (press ctrl + left click to view)    Review of Systems: Reviewed                     Negative: no chest pain or dyspnea at rest                     Positive: +chronic dry cough, intermittent  All others negative    PERTINENT PMH REVIEWED: Yes      PAST MEDICAL & SURGICAL HISTORY:  Hypertension    BPH (benign prostatic hypertrophy)    HTN (hypertension)    Benign prostate hyperplasia    GERD (gastroesophageal reflux disease)    Depression, major    H/O interstitial lung disease  on 4l via nc    No significant past surgical history    No significant past surgical history      FAMILY HISTORY:  No pertinent family history in first degree relatives        Allergies    No Known Allergies    Intolerances        SOCIAL HISTORY:                      Substance history:                    Admitted from:  NH                    Children: 2 daughters                     Pentecostal/spirituality:                    Cultural concerns:    DECISION MAKER(s):  [] Health Care Proxy(s)  [] Surrogate(s)  [] Guardian             ADVANCE DIRECTIVES/TREATMENT PREFERENCES:  DNR YES NO  Completed on:                     MOLST  YES NO   Completed on:  Living Will  YES NO   Completed on:    Baseline ADLs (prior to admission):  Independent/ Dependent      Karnofsky/Palliative Performance Status Version 2:  40%  http://npcrc.org/files/news/palliative_performance_scale_ppsv2.pdf    MEDICATIONS  (STANDING):  doxycycline hyclate Capsule 100 milliGRAM(s) Oral every 12 hours  enoxaparin Injectable 30 milliGRAM(s) SubCutaneous every 24 hours  escitalopram 10 milliGRAM(s) Oral daily  famotidine    Tablet 20 milliGRAM(s) Oral two times a day  levETIRAcetam 500 milliGRAM(s) Oral two times a day  methylPREDNISolone sodium succinate Injectable 40 milliGRAM(s) IV Push every 12 hours  montelukast 10 milliGRAM(s) Oral daily  multivitamin 1 Tablet(s) Oral daily  polyethylene glycol 3350 17 Gram(s) Oral two times a day  tamsulosin 0.4 milliGRAM(s) Oral at bedtime    MEDICATIONS  (PRN):  acetaminophen     Tablet .. 650 milliGRAM(s) Oral every 6 hours PRN Temp greater or equal to 38C (100.4F), Mild Pain (1 - 3)  ALBUTerol    0.083% 2.5 milliGRAM(s) Nebulizer every 6 hours PRN Bronchospasm  benzonatate 100 milliGRAM(s) Oral every 8 hours PRN Cough  guaiFENesin Oral Liquid (Sugar-Free) 100 milliGRAM(s) Oral every 6 hours PRN Cough      PHYSICAL EXAM:    Vital Signs Last 24 Hrs  T(C): 36.5 (28 Mar 2022 08:00), Max: 36.5 (28 Mar 2022 08:00)  T(F): 97.7 (28 Mar 2022 08:00), Max: 97.7 (28 Mar 2022 08:00)  HR: 76 (28 Mar 2022 08:00) (76 - 96)  BP: 111/68 (28 Mar 2022 08:00) (91/58 - 114/72)  BP(mean): --  RR: 18 (28 Mar 2022 08:00) (18 - 18)  SpO2: 96% (28 Mar 2022 08:00) (96% - 99%)    General: frail. cachetic.  resting comfortably and no acute distress.     HEENT: mucous membrane moist     Lungs: comfortable nonlabored O2NC    CV: S1/S2. Regular rate and rhythm    GI: +BS abdomen soft, nondistended, nontender      : normal       MSK: moves all 4 extremities, no cyanosis or edema +weakness       Neuro: nonfocal. awake and alert  oriented x _3__    Skin: warm and dry.      LABS:                        12.2   15.70 )-----------( 181      ( 28 Mar 2022 03:49 )             39.0     03-28    142  |  103  |  28.5<H>  ----------------------------<  93  4.9   |  29.0  |  0.82    Ca    8.7      28 Mar 2022 03:49  Mg     2.2     03-27    TPro  6.8  /  Alb  3.5  /  TBili  0.3<L>  /  DBili  x   /  AST  13  /  ALT  7   /  AlkPhos  56  03-28    I&O's Summary      RADIOLOGY & ADDITIONAL STUDIES:    CXR 3/25/22    ACC: [id number] EXAM:  XR CHEST PORTABLE URGENT 1V                          PROCEDURE DATE:  03/25/2022        INTERPRETATION:  HISTORY: ; ; sob;  TECHNIQUE: Portable frontal view of the chest, 1 view.  COMPARISON: 11/10/2021.  FINDINGS/  IMPRESSION:    HEART: normal in size  LUNGS: Bilateral coarse infiltrates, unchanged. Likely representing   fibrosis.  BONES: degenerative changes      --- End of Report ---    ELIZABETH TAYLOR MD; Attending Interventional Radiologist  This document has been electronically signed. Mar 26 2022  9:24AM    CT Chest 3/26/22    ACC: [id number] EXAM:  CT CHEST                          PROCEDURE DATE:  03/26/2022      INTERPRETATION:  INDICATION: Pulmonary fibrosis, rule out pneumonia    TECHNIQUE: Volumetric images of the chest without intravenous contrast.   Maximum intensity projection images were generated.    COMPARISON: 11/7/2021.    FINDINGS:    LUNGS/AIRWAYS/PLEURA: Patent trachea and bronchi. Stable fibrosis with   diffuse traction bronchiectasis, basilar predominant, and some   predilection to the anterior upper lobes. Right lower lobe calcified   granuloma. Unremarkable pleura.    LYMPH NODES/MEDIASTINUM: Calcified lymph nodes consistent with prior   granulomatous disease.    HEART/VASCULATURE: Enlarged heart. No pericardial effusion. Approximate   4.1 cm midascending aorta. Enlarged main pulmonary artery at 3.6 cm.    UPPER ABDOMEN: Cholelithiasis.    BONES/SOFT TISSUES: Degenerative changes of the spine.      IMPRESSION:    Progressive fibrotic interstitial lung disease, similar to November 2021   and markedly increased since December 2017 at which time there was a   substantial groundglass component. Findings are suggestive of fibrotic   NSIP versus UIP pattern. Connective tissue disease associated ILD is a   consideration.    --- End of Report ---    MARIE KIRBY M.D., ATTENDING RADIOLOGIST  This document has been electronically signed. Mar 26 2022  4:18PM

## 2022-03-28 NOTE — CONSULT NOTE ADULT - CONVERSATION DETAILS
Met with patient along with palliative DEBBIE Perez to introduce role of palliative care. Assistance by Citizen [place] language line .     Reviewed any prior HCP which pt states no. We introduce and explained the role and importance of designating a HCP for down the road should he lose consciousness or lack capacity; a Citizen [place] HCP form provided. When asked who that person would be that he felt most comfortable making medical decisions on his behalf, he clearly stated " for any emergency call my nijessie Baig." He reiterated this two other times during our conversation. HCP form completed, signed by patient and placed in chart.     We further explored his understanding of his underlying lung disease. He appears to have limited insight in the severity of his chronic lung disease. When asked if he uses oxygen, he stated no. Per chart, he is at 4L NC baseline. At this time, his goals is to continue active treatments and remain full code.    Emotional support provided and all questions answered.

## 2022-03-28 NOTE — PHYSICAL THERAPY INITIAL EVALUATION ADULT - ADDITIONAL COMMENTS
pt is a questionable historian, states ambulates with a RW, as per chart pt lives at [facility]

## 2022-03-28 NOTE — PATIENT PROFILE ADULT - FALL HARM RISK - PATIENT NEEDS ASSISTANCE
John Powers is a 63 y.o. female who was seen by synchronous (real-time) audio-video technology on 5/12/2020. Consent: John Powers, who was seen by synchronous (real-time) audio-video technology, and/or her healthcare decision maker, is aware that this patient-initiated, Telehealth encounter on 5/12/2020 is a billable service, with coverage as determined by her insurance carrier. She is aware that she may receive a bill and has provided verbal consent to proceed: Yes. Assessment & Plan:   Diagnoses and all orders for this visit:    1. Restless leg  -     rOPINIRole (REQUIP) 3 mg tab tab; TAKE 1 TABLET BY MOUTH EVERY DAY            712  Subjective:   John Powers is a 63 y.o. female who was seen for No chief complaint on file. Prior to Admission medications    Medication Sig Start Date End Date Taking? Authorizing Provider   traZODone (DESYREL) 100 mg tablet TAKE 1 TABLET BY MOUTH EVERY DAY EVERY NIGHT 5/5/20   Colt Johnston MD   levothyroxine (SYNTHROID) 137 mcg tablet TAKE 1 TABLET BY MOUTH DAILY (BEFORE BREAKFAST). 5/5/20   Colt Johnston MD   sertraline (ZOLOFT) 50 mg tablet TAKE 1 TABLET BY MOUTH EVERY DAY 5/5/20   Colt Johnston MD   rOPINIRole (REQUIP) 1 mg tablet TAKE 1 TABLET BY MOUTH EVERY DAY 5/5/20   Colt Johnston MD   rOPINIRole (REQUIP) 2 mg tablet Take 1 Tab by mouth nightly. 4/9/20   Colt Johnston MD   rivaroxaban (Xarelto) 20 mg tab tablet Take 1 Tab by mouth daily (with breakfast). 4/6/20   Colt Johnston MD   phentermine (ADIPEX-P) 37.5 mg tablet Take 1 Tab by mouth every morning. Max Daily Amount: 37.5 mg. 2/11/20   Colt Johnston MD   ketoconazole (NIZORAL) 2 % shampoo Use as directed 2/11/20   Colt Johnston MD   calcium citrate-vitamin d3 (CITRACAL+D) 315-200 mg-unit tab Take 1 Tab by mouth daily (with breakfast).     Provider, Historical   melatonin 3 mg tablet Take  by mouth. 6mg-9 mg as needed    Provider, Historical   midodrine (PROAMITINE) 5 mg tablet Take 1 Tab by mouth three (3) times daily. 11/15/19   Herrera Putnam MD   comprs. stocking,thigh,long,med (COMP. STOCKING,THIGH,LONG,MED.) misc 2 Packages by Does Not Apply route daily as needed (edema). Remove when lying down 5/17/19   Herrera Putnam MD   thiamine HCl (VITAMIN B-1 PO) Take  by mouth. Provider, Historical   cyanocobalamin (VITAMIN B-12) 500 mcg tablet Take 500 mcg by mouth daily. Provider, Historical   multivitamin (ONE A DAY) tablet Take 1 Tab by mouth two (2) times a day. Provider, Historical   Cholecalciferol, Vitamin D3, (VITAMIN D3) 1,000 unit chew Take 2,000 Units by mouth daily.     Provider, Historical     No Known Allergies    Patient Active Problem List   Diagnosis Code    Hypothyroidism E03.9    DJD (degenerative joint disease) M19.90    DVT (deep venous thrombosis) (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)     clotting disorder    Hx of colonic polyps     HX OTHER MEDICAL     pulmonary embolism x 2    HX OTHER MEDICAL     PCOS    HX OTHER MEDICAL     Heart monitor 1/2015     Hypertension     no longer on meds since gastric bypass    Long term (current) use of anticoagulants     Osteopenia     PE (pulmonary thromboembolism) ([address] 2008    Plantar fasciitis of left foot     Tendinopathy Oct 2013    Right insertional Achilles tendinopathy    Thromboembolus ([address]     blood clot in left leg then lung in 2007, then clot behind right knee 2009    Thromboembolus [facility])     also PE    Thyroid disease        Review of Systems   Constitutional: Negative for chills and fever. Cardiovascular: Negative for chest pain and palpitations. Psychiatric/Behavioral: Negative. Negative for depression, hallucinations, memory loss, substance abuse and suicidal ideas. The patient is not nervous/anxious and does not have insomnia. Objective:     Visit Vitals  LMP 12/31/2013      General: alert, cooperative, no distress   Mental  status: normal mood, behavior, speech, dress, motor activity, and thought processes, able to follow commands   HENT: NCAT   Neck: no visualized mass   Resp: no respiratory distress   Neuro: no gross deficits   Skin: no discoloration or lesions of concern on visible areas   Psychiatric: normal affect, consistent with stated mood, no evidence of hallucinations     Additional exam findings: We discussed the expected course, resolution and complications of the diagnosis(es) in detail. Medication risks, benefits, costs, interactions, and alternatives were discussed as indicated. I advised her to contact the office if her condition worsens, changes or fails to improve as anticipated. She expressed understanding with the diagnosis(es) and plan. Rain Keane is a 63 y.o. female who was evaluated by a video visit encounter for concerns as above. Patient identification was verified prior to start of the visit. A caregiver was present when appropriate. Due to this being a TeleHealth encounter (During [facility]- public health emergency), evaluation of the following organ systems was limited: Vitals/Constitutional/EENT/Resp/CV/GI//MS/Neuro/Skin/Heme-Lymph-Imm.   Pursuant to the emergency declaration under the [facility] waiver authority and the Coronavirus Preparedness and Response Supplemental Appropriations Act, this Virtual  Visit was conducted, with patient's (and/or legal guardian's) consent, to reduce the patient's risk of exposure to COVID-19 and provide necessary medical care. Services were provided through a video synchronous discussion virtually via doxy. me to substitute for in-person clinic visit. Patient was at home and provider was at the office.       Zack Fontana MD Standing/Walking

## 2022-03-28 NOTE — CONSULT NOTE ADULT - ASSESSMENT
75yo M with progressive ILD, chronic respiratory failure on home O2, COPD, former smoker, HTN, depression, seizure admitted for acute respiratory failure 2/2 ILD exacerbation.     PLAN    Acute on Chronic Respiratory Failure  Progressive ILD  COPD/Former Smoker  - CT chest noted above, prgressive fibrotic ILD similar to Nov 2021  - pulmonary on board, input appreciated  - c/w O2 supplement (at 4L NC baseline), antibiotic, corticosteroids     Cachexia  - low BMI,  will check prealbumin  - recommend nutrition eval and consider ensure supplements to increase caloric intake    Depression  - on escitalopram    Debility   - supportive care, fall precaution, PT     Advance Care Planning  Palliative Care Encounter  - see goc above

## 2022-03-28 NOTE — PATIENT PROFILE ADULT - FALL HARM RISK - HARM RISK INTERVENTIONS

## 2022-03-29 LAB
ALBUMIN SERPL ELPH-MCNC: 3.5 G/DL — SIGNIFICANT CHANGE UP (ref 3.3–5.2)
ALP SERPL-CCNC: 54 U/L — SIGNIFICANT CHANGE UP (ref 40–120)
ALT FLD-CCNC: 8 U/L — SIGNIFICANT CHANGE UP
ANION GAP SERPL CALC-SCNC: 6 MMOL/L — SIGNIFICANT CHANGE UP (ref 5–17)
AST SERPL-CCNC: 12 U/L — SIGNIFICANT CHANGE UP
BILIRUB SERPL-MCNC: 0.5 MG/DL — SIGNIFICANT CHANGE UP (ref 0.4–2)
BUN SERPL-MCNC: 26.4 MG/DL — HIGH (ref 8–20)
CALCIUM SERPL-MCNC: 8.6 MG/DL — SIGNIFICANT CHANGE UP (ref 8.6–10.2)
CHLORIDE SERPL-SCNC: 100 MMOL/L — SIGNIFICANT CHANGE UP (ref 98–107)
CO2 SERPL-SCNC: 34 MMOL/L — HIGH (ref 22–29)
CREAT SERPL-MCNC: 0.65 MG/DL — SIGNIFICANT CHANGE UP (ref 0.5–1.3)
EGFR: 98 ML/MIN/1.73M2 — SIGNIFICANT CHANGE UP
GLUCOSE SERPL-MCNC: 105 MG/DL — HIGH (ref 70–99)
HCT VFR BLD CALC: 43 % — SIGNIFICANT CHANGE UP (ref 39–50)
HGB BLD-MCNC: 13.2 G/DL — SIGNIFICANT CHANGE UP (ref 13–17)
MCHC RBC-ENTMCNC: 29.5 PG — SIGNIFICANT CHANGE UP (ref 27–34)
MCHC RBC-ENTMCNC: 30.7 GM/DL — LOW (ref 32–36)
MCV RBC AUTO: 96.2 FL — SIGNIFICANT CHANGE UP (ref 80–100)
PLATELET # BLD AUTO: 165 K/UL — SIGNIFICANT CHANGE UP (ref 150–400)
POTASSIUM SERPL-MCNC: 4.9 MMOL/L — SIGNIFICANT CHANGE UP (ref 3.5–5.3)
POTASSIUM SERPL-SCNC: 4.9 MMOL/L — SIGNIFICANT CHANGE UP (ref 3.5–5.3)
PROT SERPL-MCNC: 6.8 G/DL — SIGNIFICANT CHANGE UP (ref 6.6–8.7)
RBC # BLD: 4.47 M/UL — SIGNIFICANT CHANGE UP (ref 4.2–5.8)
RBC # FLD: 12.5 % — SIGNIFICANT CHANGE UP (ref 10.3–14.5)
SODIUM SERPL-SCNC: 140 MMOL/L — SIGNIFICANT CHANGE UP (ref 135–145)
WBC # BLD: 9.89 K/UL — SIGNIFICANT CHANGE UP (ref 3.8–10.5)
WBC # FLD AUTO: 9.89 K/UL — SIGNIFICANT CHANGE UP (ref 3.8–10.5)

## 2022-03-29 PROCEDURE — 99232 SBSQ HOSP IP/OBS MODERATE 35: CPT

## 2022-03-29 PROCEDURE — 99497 ADVNCD CARE PLAN 30 MIN: CPT | Mod: 25

## 2022-03-29 PROCEDURE — 99233 SBSQ HOSP IP/OBS HIGH 50: CPT

## 2022-03-29 PROCEDURE — 99223 1ST HOSP IP/OBS HIGH 75: CPT

## 2022-03-29 RX ADMIN — Medication 40 MILLIGRAM(S): at 05:09

## 2022-03-29 RX ADMIN — ENOXAPARIN SODIUM 30 MILLIGRAM(S): 100 INJECTION SUBCUTANEOUS at 16:37

## 2022-03-29 RX ADMIN — LEVETIRACETAM 500 MILLIGRAM(S): 250 TABLET, FILM COATED ORAL at 05:08

## 2022-03-29 RX ADMIN — FAMOTIDINE 20 MILLIGRAM(S): 10 INJECTION INTRAVENOUS at 05:08

## 2022-03-29 RX ADMIN — MONTELUKAST 10 MILLIGRAM(S): 4 TABLET, CHEWABLE ORAL at 09:51

## 2022-03-29 RX ADMIN — ALBUTEROL 2.5 MILLIGRAM(S): 90 AEROSOL, METERED ORAL at 20:04

## 2022-03-29 RX ADMIN — ESCITALOPRAM OXALATE 10 MILLIGRAM(S): 10 TABLET, FILM COATED ORAL at 16:34

## 2022-03-29 RX ADMIN — Medication 1 TABLET(S): at 09:51

## 2022-03-29 RX ADMIN — POLYETHYLENE GLYCOL 3350 17 GRAM(S): 17 POWDER, FOR SOLUTION ORAL at 16:37

## 2022-03-29 RX ADMIN — TAMSULOSIN HYDROCHLORIDE 0.4 MILLIGRAM(S): 0.4 CAPSULE ORAL at 21:15

## 2022-03-29 RX ADMIN — Medication 100 MILLIGRAM(S): at 05:08

## 2022-03-29 RX ADMIN — Medication 100 MILLIGRAM(S): at 16:34

## 2022-03-29 RX ADMIN — LEVETIRACETAM 500 MILLIGRAM(S): 250 TABLET, FILM COATED ORAL at 16:34

## 2022-03-29 RX ADMIN — POLYETHYLENE GLYCOL 3350 17 GRAM(S): 17 POWDER, FOR SOLUTION ORAL at 09:47

## 2022-03-29 RX ADMIN — FAMOTIDINE 20 MILLIGRAM(S): 10 INJECTION INTRAVENOUS at 16:34

## 2022-03-29 NOTE — DIETITIAN INITIAL EVALUATION ADULT. - SIGNS/SYMPTOMS
as evidenced by pt with mod muscle wasting/fat loss, possible wt loss, likely meeting <75% est needs

## 2022-03-29 NOTE — DIETITIAN NUTRITION RISK NOTIFICATION - TREATMENT: THE FOLLOWING DIET HAS BEEN RECOMMENDED
Diet, Pureed:   DASH/TLC {Sodium & Cholesterol Restricted} (DASH)  Moderately Thick Liquids (MODTHICKLIQS) (03-26-22 @ 03:40) [Active]

## 2022-03-29 NOTE — PROGRESS NOTE ADULT - SUBJECTIVE AND OBJECTIVE BOX
[facility] Division of Hospital Medicine    Chief Complaint: SOB     SUBJECTIVE / OVERNIGHT EVENTS: No acute events overnight. HD stable. Patient continues to require NC 4L.     Patient denies chest pain, SOB, abd pain, N/V, fever, chills, dysuria or any other complaints. All remainder ROS negative.     MEDICATIONS  (STANDING):  doxycycline hyclate Capsule 100 milliGRAM(s) Oral every 12 hours  enoxaparin Injectable 30 milliGRAM(s) SubCutaneous every 24 hours  escitalopram 10 milliGRAM(s) Oral daily  famotidine    Tablet 20 milliGRAM(s) Oral two times a day  levETIRAcetam 500 milliGRAM(s) Oral two times a day  methylPREDNISolone sodium succinate Injectable 40 milliGRAM(s) IV Push daily  montelukast 10 milliGRAM(s) Oral daily  multivitamin 1 Tablet(s) Oral daily  polyethylene glycol 3350 17 Gram(s) Oral two times a day  tamsulosin 0.4 milliGRAM(s) Oral at bedtime    MEDICATIONS  (PRN):  acetaminophen     Tablet .. 650 milliGRAM(s) Oral every 6 hours PRN Temp greater or equal to 38C (100.4F), Mild Pain (1 - 3)  ALBUTerol    0.083% 2.5 milliGRAM(s) Nebulizer every 6 hours PRN Bronchospasm  benzonatate 100 milliGRAM(s) Oral every 8 hours PRN Cough  guaiFENesin Oral Liquid (Sugar-Free) 100 milliGRAM(s) Oral every 6 hours PRN Cough        I&O's Summary    28 Mar 2022 07:01  -  29 Mar 2022 07:00  --------------------------------------------------------  IN: 100 mL / OUT: 150 mL / NET: -50 mL        PHYSICAL EXAM:  Vital Signs Last 24 Hrs  T(C): 36.4 (29 Mar 2022 10:15), Max: 36.8 (28 Mar 2022 23:49)  T(F): 97.5 (29 Mar 2022 10:15), Max: 98.2 (28 Mar 2022 23:49)  HR: 74 (29 Mar 2022 10:15) (63 - 88)  BP: 114/70 (29 Mar 2022 10:15) (100/62 - 158/82)  BP(mean): --  RR: 19 (29 Mar 2022 10:15) (18 - 20)  SpO2: 99% (29 Mar 2022 08:00) (97% - 100%)      CONSTITUTIONAL: NAD, well-developed  RESPIRATORY: Normal respiratory effort; lungs are clear to auscultation bilaterally  CARDIOVASCULAR: Regular rate and rhythm, normal S1 and S2  ABDOMEN: Nontender to palpation, normoactive bowel sounds  PSYCH: A+O to person, place, and time; affect appropriate  NEUROLOGY: CN 2-12 are intact and symmetric; no gross sensory deficits;   SKIN: No rashes; no palpable lesions    LABS:                        13.2   9.89  )-----------( 165      ( 29 Mar 2022 07:21 )             43.0     03-29    140  |  100  |  26.4<H>  ----------------------------<  105<H>  4.9   |  34.0<H>  |  0.65    Ca    8.6      29 Mar 2022 07:21    TPro  6.8  /  Alb  3.5  /  TBili  0.5  /  DBili  x   /  AST  12  /  ALT  8   /  AlkPhos  54  03-29              CAPILLARY BLOOD GLUCOSE            RADIOLOGY & ADDITIONAL TESTS:  Results Reviewed:   Imaging Personally Reviewed:  Electrocardiogram Personally Reviewed:                                          
[facility] Division of Hospital Medicine    Chief Complaint:  SOB    SUBJECTIVE / OVERNIGHT EVENTS: No acute events overnight. HD stable. Patient continues to require NC 4L.     Patient denies chest pain, SOB, abd pain, N/V, fever, chills, dysuria or any other complaints. All remainder ROS negative.     MEDICATIONS  (STANDING):  doxycycline hyclate Capsule 100 milliGRAM(s) Oral every 12 hours  enoxaparin Injectable 30 milliGRAM(s) SubCutaneous every 24 hours  escitalopram 10 milliGRAM(s) Oral daily  famotidine    Tablet 20 milliGRAM(s) Oral two times a day  levETIRAcetam 500 milliGRAM(s) Oral two times a day  methylPREDNISolone sodium succinate Injectable 40 milliGRAM(s) IV Push every 8 hours  montelukast 10 milliGRAM(s) Oral daily  multivitamin 1 Tablet(s) Oral daily  polyethylene glycol 3350 17 Gram(s) Oral two times a day  tamsulosin 0.4 milliGRAM(s) Oral at bedtime    MEDICATIONS  (PRN):  acetaminophen     Tablet .. 650 milliGRAM(s) Oral every 6 hours PRN Temp greater or equal to 38C (100.4F), Mild Pain (1 - 3)  ALBUTerol    0.083% 2.5 milliGRAM(s) Nebulizer every 6 hours PRN Bronchospasm  benzonatate 100 milliGRAM(s) Oral every 8 hours PRN Cough  guaiFENesin Oral Liquid (Sugar-Free) 100 milliGRAM(s) Oral every 6 hours PRN Cough        I&O's Summary      PHYSICAL EXAM:  Vital Signs Last 24 Hrs  T(C): 36.5 (28 Mar 2022 08:00), Max: 36.5 (28 Mar 2022 08:00)  T(F): 97.7 (28 Mar 2022 08:00), Max: 97.7 (28 Mar 2022 08:00)  HR: 76 (28 Mar 2022 08:00) (76 - 96)  BP: 111/68 (28 Mar 2022 08:00) (91/58 - 114/72)  BP(mean): --  RR: 18 (28 Mar 2022 08:00) (18 - 18)  SpO2: 96% (28 Mar 2022 08:00) (96% - 99%)      CONSTITUTIONAL: NAD, well-developed  RESPIRATORY: Normal respiratory effort; lungs are clear to auscultation bilaterally  CARDIOVASCULAR: Regular rate and rhythm, normal S1 and S2  ABDOMEN: Nontender to palpation, normoactive bowel sounds  PSYCH: A+O to person, place, and time; affect appropriate  NEUROLOGY: CN 2-12 are intact and symmetric; no gross sensory deficits;   SKIN: No rashes; no palpable lesions    LABS:                        12.2   15.70 )-----------( 181      ( 28 Mar 2022 03:49 )             39.0     03-28    142  |  103  |  28.5<H>  ----------------------------<  93  4.9   |  29.0  |  0.82    Ca    8.7      28 Mar 2022 03:49  Mg     2.2     03-27    TPro  6.8  /  Alb  3.5  /  TBili  0.3<L>  /  DBili  x   /  AST  13  /  ALT  7   /  AlkPhos  56  03-28              Culture - Blood (collected 26 Mar 2022 00:12)  Source: .Blood Blood-Peripheral  Preliminary Report (28 Mar 2022 01:00):    No growth at 48 hours    Culture - Blood (collected 26 Mar 2022 00:11)  Source: .Blood Blood-Peripheral  Preliminary Report (28 Mar 2022 01:00):    No growth at 48 hours      CAPILLARY BLOOD GLUCOSE            RADIOLOGY & ADDITIONAL TESTS:  Results Reviewed:   Imaging Personally Reviewed:  Electrocardiogram Personally Reviewed:                                          
[facility] PALLIATIVE MEDICINE     CC: FOLLOW UP VISIT + GOC    INTERVAL HPI/OVERNIGHT EVENTS:  Source if other than patient:  []Family   [x]Team     No acute events overnight.   Seen with NP MARK Bird and RN Pedro who assisted with Georgian translation.     PRESENT SYMPTOMS:     Dyspnea: yes with exertion  Nausea/Vomiting:  No  Anxiety:   No  Depression:  No  Fatigue: Yes    Loss of appetite: Yes   Constipation:  No    Pain: No            Character-            Duration-            Effect-            Factors-            Frequency-            Location-            Severity-    Pain AD Score:  [email or web address] (press ctrl + left click to view)    Review of Systems: Reviewed                     Negative: no chest pain or dyspnea at rest                     Positive:  All others negative    MEDICATIONS  (STANDING):  doxycycline hyclate Capsule 100 milliGRAM(s) Oral every 12 hours  enoxaparin Injectable 30 milliGRAM(s) SubCutaneous every 24 hours  escitalopram 10 milliGRAM(s) Oral daily  famotidine    Tablet 20 milliGRAM(s) Oral two times a day  levETIRAcetam 500 milliGRAM(s) Oral two times a day  methylPREDNISolone sodium succinate Injectable 40 milliGRAM(s) IV Push daily  montelukast 10 milliGRAM(s) Oral daily  multivitamin 1 Tablet(s) Oral daily  polyethylene glycol 3350 17 Gram(s) Oral two times a day  tamsulosin 0.4 milliGRAM(s) Oral at bedtime    MEDICATIONS  (PRN):  acetaminophen     Tablet .. 650 milliGRAM(s) Oral every 6 hours PRN Temp greater or equal to 38C (100.4F), Mild Pain (1 - 3)  ALBUTerol    0.083% 2.5 milliGRAM(s) Nebulizer every 6 hours PRN Bronchospasm  benzonatate 100 milliGRAM(s) Oral every 8 hours PRN Cough  guaiFENesin Oral Liquid (Sugar-Free) 100 milliGRAM(s) Oral every 6 hours PRN Cough      PHYSICAL EXAM:    Vital Signs Last 24 Hrs  T(C): 36.4 (29 Mar 2022 10:15), Max: 36.8 (28 Mar 2022 23:49)  T(F): 97.5 (29 Mar 2022 10:15), Max: 98.2 (28 Mar 2022 23:49)  HR: 74 (29 Mar 2022 10:15) (63 - 88)  BP: 114/70 (29 Mar 2022 10:15) (100/62 - 158/82)  BP(mean): --  RR: 19 (29 Mar 2022 10:15) (18 - 20)  SpO2: 99% (29 Mar 2022 08:00) (97% - 100%)       Karnofsky:  40%    General: cachetic. resting comfortably and no acute distress.     HEENT: mucous membrane moist     Lungs: comfortable nonlabored O2NC    CV: S1/S2. Regular rate and rhythm    GI: +BS abdomen soft, nondistended, nontender      : normal       MSK: moves all 4 extremities, no cyanosis or edema +weakness       Neuro: nonfocal. awake and alert, interactive    Skin: warm and dry.      LABS:                          13.2   9.89  )-----------( 165      ( 29 Mar 2022 07:21 )             43.0     03-29    140  |  100  |  26.4<H>  ----------------------------<  105<H>  4.9   |  34.0<H>  |  0.65    Ca    8.6      29 Mar 2022 07:21    TPro  6.8  /  Alb  3.5  /  TBili  0.5  /  DBili  x   /  AST  12  /  ALT  8   /  AlkPhos  54  03-29        I&O's Summary    28 Mar 2022 07:01  -  29 Mar 2022 07:00  --------------------------------------------------------  IN: 100 mL / OUT: 150 mL / NET: -50 mL      RADIOLOGY & ADDITIONAL STUDIES: Reviewed     ADVANCE DIRECTIVES/TREATMENT PREFERENCES:  DNR YES NO  Completed on:                     MOLST  YES NO   Completed on:  Living Will  YES NO   Completed on:       
[facility] Division of Hospital Medicine    Chief Complaint: SOB    SUBJECTIVE / OVERNIGHT EVENTS: No acute events overnight. Denies SOB, n/v/f/c/h/d. Patient continues to require NC 4L.     Patient denies chest pain, SOB, abd pain, N/V, fever, chills, dysuria or any other complaints. All remainder ROS negative.     MEDICATIONS  (STANDING):  doxycycline hyclate Capsule 100 milliGRAM(s) Oral every 12 hours  enoxaparin Injectable 30 milliGRAM(s) SubCutaneous every 24 hours  escitalopram 10 milliGRAM(s) Oral daily  famotidine    Tablet 20 milliGRAM(s) Oral two times a day  levETIRAcetam 500 milliGRAM(s) Oral two times a day  methylPREDNISolone sodium succinate Injectable 40 milliGRAM(s) IV Push every 8 hours  montelukast 10 milliGRAM(s) Oral daily  multivitamin 1 Tablet(s) Oral daily  tamsulosin 0.4 milliGRAM(s) Oral at bedtime    MEDICATIONS  (PRN):  acetaminophen     Tablet .. 650 milliGRAM(s) Oral every 6 hours PRN Temp greater or equal to 38C (100.4F), Mild Pain (1 - 3)  ALBUTerol    0.083% 2.5 milliGRAM(s) Nebulizer every 6 hours PRN Bronchospasm  benzonatate 100 milliGRAM(s) Oral every 8 hours PRN Cough  guaiFENesin Oral Liquid (Sugar-Free) 100 milliGRAM(s) Oral every 6 hours PRN Cough        I&O's Summary      PHYSICAL EXAM:  Vital Signs Last 24 Hrs  T(C): 36.7 (27 Mar 2022 11:26), Max: 36.7 (27 Mar 2022 11:26)  T(F): 98 (27 Mar 2022 11:26), Max: 98 (27 Mar 2022 11:26)  HR: 70 (27 Mar 2022 11:26) (70 - 87)  BP: 106/71 (27 Mar 2022 11:26) (100/63 - 112/70)  BP(mean): --  RR: 18 (27 Mar 2022 11:26) (18 - 20)  SpO2: 99% (27 Mar 2022 11:26) (97% - 99%)      CONSTITUTIONAL: NAD, well-developed  RESPIRATORY: Normal respiratory effort; lungs are clear to auscultation bilaterally  CARDIOVASCULAR: Regular rate and rhythm, normal S1 and S2  ABDOMEN: Nontender to palpation, normoactive bowel sounds  PSYCH: A+O to person, place, and time; affect appropriate  NEUROLOGY: CN 2-12 are intact and symmetric; no gross sensory deficits;   SKIN: No rashes; no palpable lesions    LABS:                        12.3   12.34 )-----------( 177      ( 27 Mar 2022 03:23 )             37.8     03-27    140  |  101  |  18.1  ----------------------------<  143<H>  4.7   |  29.0  |  0.69    Ca    8.7      27 Mar 2022 03:23  Mg     2.2     03-27    TPro  7.0  /  Alb  3.5  /  TBili  0.4  /  DBili  x   /  AST  13  /  ALT  7   /  AlkPhos  60  03-27      CARDIAC MARKERS ( 25 Mar 2022 23:22 )  x     / <0.01 ng/mL / 59 U/L / x     / x              CAPILLARY BLOOD GLUCOSE            RADIOLOGY & ADDITIONAL TESTS:  Results Reviewed:   Imaging Personally Reviewed:  Electrocardiogram Personally Reviewed:                                          
PULMONARY PROGRESS NOTE      PEPE SALGUERO  MRN-[id number]    Patient is a 76y old  Male who presents with a chief complaint of SOB (29 Mar 2022 12:24)      INTERVAL HPI/OVERNIGHT EVENTS:    Pt is awake and alert  Comfortable  Less SOB    MEDICATIONS  (STANDING):  doxycycline hyclate Capsule 100 milliGRAM(s) Oral every 12 hours  enoxaparin Injectable 30 milliGRAM(s) SubCutaneous every 24 hours  escitalopram 10 milliGRAM(s) Oral daily  famotidine    Tablet 20 milliGRAM(s) Oral two times a day  levETIRAcetam 500 milliGRAM(s) Oral two times a day  methylPREDNISolone sodium succinate Injectable 40 milliGRAM(s) IV Push daily  montelukast 10 milliGRAM(s) Oral daily  multivitamin 1 Tablet(s) Oral daily  polyethylene glycol 3350 17 Gram(s) Oral two times a day  tamsulosin 0.4 milliGRAM(s) Oral at bedtime      MEDICATIONS  (PRN):  acetaminophen     Tablet .. 650 milliGRAM(s) Oral every 6 hours PRN Temp greater or equal to 38C (100.4F), Mild Pain (1 - 3)  ALBUTerol    0.083% 2.5 milliGRAM(s) Nebulizer every 6 hours PRN Bronchospasm  benzonatate 100 milliGRAM(s) Oral every 8 hours PRN Cough  guaiFENesin Oral Liquid (Sugar-Free) 100 milliGRAM(s) Oral every 6 hours PRN Cough      Allergies    No Known Allergies    Intolerances        PAST MEDICAL & SURGICAL HISTORY:  Hypertension    BPH (benign prostatic hypertrophy)    HTN (hypertension)    Benign prostate hyperplasia    GERD (gastroesophageal reflux disease)    Depression, major    H/O interstitial lung disease  on 4l via nc    No significant past surgical history    No significant past surgical history          REVIEW OF SYSTEMS: Offers no new compliants    Vital Signs Last 24 Hrs  T(C): 36.4 (29 Mar 2022 10:15), Max: 36.8 (28 Mar 2022 23:49)  T(F): 97.5 (29 Mar 2022 10:15), Max: 98.2 (28 Mar 2022 23:49)  HR: 74 (29 Mar 2022 10:15) (63 - 88)  BP: 114/70 (29 Mar 2022 10:15) (100/62 - 158/82)  BP(mean): --  RR: 19 (29 Mar 2022 10:15) (18 - 20)  SpO2: 99% (29 Mar 2022 08:00) (97% - 100%)    PHYSICAL EXAMINATION:    GENERAL: The patient is awake and alert in no apparent distress.     HEENT: Head is normocephalic and atraumatic. Extraocular muscles are intact. Mucous membranes are moist.    NECK: Supple.    LUNGS: Bibasilar rales otherwise CTA without wheezing or rhonchi; respirations unlabored    HEART: Regular rate and rhythm without murmur.    ABDOMEN: Soft, nontender, and nondistended.      EXTREMITIES: Without any cyanosis, clubbing, rash, lesions or edema.    NEUROLOGIC: Grossly intact.    LABS:                        13.2   9.89  )-----------( 165      ( 29 Mar 2022 07:21 )             43.0     03-29    140  |  100  |  26.4<H>  ----------------------------<  105<H>  4.9   |  34.0<H>  |  0.65    Ca    8.6      29 Mar 2022 07:21    TPro  6.8  /  Alb  3.5  /  TBili  0.5  /  DBili  x   /  AST  12  /  ALT  8   /  AlkPhos  54  03-29      MICROBIOLOGY:    COVID-19 PCR . (03.28.22 @ 12:00)    COVID-19 PCR: NotDetec: You can help in the fight against COVID-19. [facility] may contact  you to see if you are interested in voluntarily participating in one of  our clinical trials.  Testing is performed using polymerase chain reaction (PCR) or  transcription mediated amplification (TMA). This COVID-19 (SARS-CoV-2)  nucleic acid amplification test was validated by [employer] and is  in use under the FDA Emergency Use Authorization (EUA) for clinical labs  CLIA-certified to perform high complexity testing. Test results should be  correlated with clinical presentation, patient history, and epidemiology.        Culture - Blood (03.26.22 @ 00:12)    Specimen Source: .Blood Blood-Peripheral    Culture Results:   No growth at 48 hours      RADIOLOGY & ADDITIONAL STUDIES:    ACC: [id number] EXAM:  CT CHEST                          PROCEDURE DATE:  03/26/2022          INTERPRETATION:  INDICATION: Pulmonary fibrosis, rule out pneumonia    TECHNIQUE: Volumetric images of the chest without intravenous contrast.   Maximum intensity projection images were generated.    COMPARISON: 11/7/2021.    FINDINGS:    LUNGS/AIRWAYS/PLEURA: Patent trachea and bronchi. Stable fibrosis with   diffuse traction bronchiectasis, basilar predominant, and some   predilection to the anterior upper lobes. Right lower lobe calcified   granuloma. Unremarkable pleura.    LYMPH NODES/MEDIASTINUM: Calcified lymph nodes consistent with prior   granulomatous disease.    HEART/VASCULATURE: Enlarged heart. No pericardial effusion. Approximate   4.1 cm midascending aorta. Enlarged main pulmonary artery at 3.6 cm.    UPPER ABDOMEN: Cholelithiasis.    BONES/SOFT TISSUES: Degenerative changes of the spine.      IMPRESSION:    Progressive fibrotic interstitial lung disease, similar to November 2021   and markedly increased since December 2017 at which time there was a   substantial groundglass component. Findings are suggestive of fibrotic   NSIP versus UIP pattern. Connective tissue disease associated ILD is a   consideration.        MARIE KIRBY M.D., ATTENDING RADIOLOGIST  This document has been electronically signed. Mar 26 2022  4:18PM      ECHO 11/13/21:    CONCLUSIONS:  1. Mitral annular calcification, otherwise normal mitral  valve. Mild mitral regurgitation.  2. Mildly calcified trileaflet aortic valve with normal  opening. Mild aortic regurgitation.  3. Normal left ventricular internal dimensions and wall  thicknesses.  4. Mild global left ventricular systolic dysfunction. LVEF  45-50%.  5. Mild diastolic dysfunction (Stage I).  6. The right ventricle is not well visualized; grossly  normal right ventricular systolic function.  7. Normal tricuspid valve. Mild tricuspid regurgitation.  8. Estimated pulmonary artery systolic pressure equals 55  mm Hg, assuming right atrial pressure equals 10  mm Hg,  consistent with moderate pulmonary hypertension.  *** No previous Echo exam.  ------------------------------------------------------------------------  Confirmed on  11/13/2021 - 12:16:58 by Ochoa Watson MD,  [facility], [facility], [facility]  ------------------------------------------------------------------------  
[facility] Division of Hospital Medicine    Chief Complaint: SOB      SUBJECTIVE / OVERNIGHT EVENTS: [place] PI# [id number]. Overnight, patient desatted to 85% and required NC 4L. Patient endorses improved SOB and mild cough w/ productive sputum. Denies n/v/f/c/h/d.     Patient denies chest pain, SOB, abd pain, N/V, fever, chills, dysuria or any other complaints. All remainder ROS negative.     MEDICATIONS  (STANDING):  acetylcysteine 10%  Inhalation 4 milliLiter(s) Inhalation every 8 hours  albuterol/ipratropium for Nebulization 3 milliLiter(s) Nebulizer every 6 hours  azithromycin   Tablet 500 milliGRAM(s) Oral daily  enoxaparin Injectable 30 milliGRAM(s) SubCutaneous every 24 hours  escitalopram 10 milliGRAM(s) Oral daily  famotidine    Tablet 20 milliGRAM(s) Oral two times a day  levETIRAcetam 500 milliGRAM(s) Oral two times a day  methylPREDNISolone sodium succinate Injectable 40 milliGRAM(s) IV Push every 6 hours  montelukast 10 milliGRAM(s) Oral daily  multivitamin 1 Tablet(s) Oral daily  tamsulosin 0.4 milliGRAM(s) Oral at bedtime    MEDICATIONS  (PRN):  acetaminophen     Tablet .. 650 milliGRAM(s) Oral every 6 hours PRN Temp greater or equal to 38C (100.4F), Mild Pain (1 - 3)  benzonatate 100 milliGRAM(s) Oral every 8 hours PRN Cough  guaiFENesin Oral Liquid (Sugar-Free) 100 milliGRAM(s) Oral every 6 hours PRN Cough        I&O's Summary      PHYSICAL EXAM:  Vital Signs Last 24 Hrs  T(C): 36.7 (26 Mar 2022 08:39), Max: 36.7 (26 Mar 2022 08:39)  T(F): 98 (26 Mar 2022 08:39), Max: 98 (26 Mar 2022 08:39)  HR: 77 (26 Mar 2022 08:39) (77 - 100)  BP: 110/68 (26 Mar 2022 08:39) (105/65 - 110/68)  BP(mean): --  RR: 22 (26 Mar 2022 08:39) (22 - 50)  SpO2: 98% (26 Mar 2022 08:39) (85% - 99%)    CONSTITUTIONAL: NAD, well-developed  RESPIRATORY: Normal respiratory effort; lungs are clear to auscultation bilaterally  CARDIOVASCULAR: Regular rate and rhythm, normal S1 and S2  ABDOMEN: Nontender to palpation, normoactive bowel sounds  PSYCH: A+O to person, place, and time; affect appropriate  NEUROLOGY: CN 2-12 are intact and symmetric; no gross sensory deficits;   SKIN: No rashes; no palpable lesions    LABS:                        13.2   13.05 )-----------( 189      ( 25 Mar 2022 23:22 )             40.9     03-25    141  |  100  |  14.6  ----------------------------<  144<H>  4.5   |  26.0  |  0.78    Ca    9.1      25 Mar 2022 23:22    TPro  7.5  /  Alb  3.8  /  TBili  0.5  /  DBili  x   /  AST  18  /  ALT  9   /  AlkPhos  67  03-25      CARDIAC MARKERS ( 25 Mar 2022 23:22 )  x     / <0.01 ng/mL / 59 U/L / x     / x              CAPILLARY BLOOD GLUCOSE            RADIOLOGY & ADDITIONAL TESTS:  Results Reviewed:   Imaging Personally Reviewed:  Electrocardiogram Personally Reviewed:                                          
PULMONARY PROGRESS NOTE      PEPE SALGUERO  MRN-[id number]    Patient is a 76y old  Male who presents with a chief complaint of SOB (28 Mar 2022 10:20)      INTERVAL HPI/OVERNIGHT EVENTS:    Pt is awake and alert  Feels better; less SOB, improved cough    MEDICATIONS  (STANDING):  doxycycline hyclate Capsule 100 milliGRAM(s) Oral every 12 hours  enoxaparin Injectable 30 milliGRAM(s) SubCutaneous every 24 hours  escitalopram 10 milliGRAM(s) Oral daily  famotidine    Tablet 20 milliGRAM(s) Oral two times a day  levETIRAcetam 500 milliGRAM(s) Oral two times a day  methylPREDNISolone sodium succinate Injectable 40 milliGRAM(s) IV Push every 8 hours  montelukast 10 milliGRAM(s) Oral daily  multivitamin 1 Tablet(s) Oral daily  polyethylene glycol 3350 17 Gram(s) Oral two times a day  tamsulosin 0.4 milliGRAM(s) Oral at bedtime      MEDICATIONS  (PRN):  acetaminophen     Tablet .. 650 milliGRAM(s) Oral every 6 hours PRN Temp greater or equal to 38C (100.4F), Mild Pain (1 - 3)  ALBUTerol    0.083% 2.5 milliGRAM(s) Nebulizer every 6 hours PRN Bronchospasm  benzonatate 100 milliGRAM(s) Oral every 8 hours PRN Cough  guaiFENesin Oral Liquid (Sugar-Free) 100 milliGRAM(s) Oral every 6 hours PRN Cough      Allergies    No Known Allergies    Intolerances        PAST MEDICAL & SURGICAL HISTORY:  Hypertension    BPH (benign prostatic hypertrophy)    HTN (hypertension)    Benign prostate hyperplasia    GERD (gastroesophageal reflux disease)    Depression, major    H/O interstitial lung disease  on 4l via nc    No significant past surgical history    No significant past surgical history          REVIEW OF SYSTEMS: Offers no new complaints    Vital Signs Last 24 Hrs  T(C): 36.5 (28 Mar 2022 08:00), Max: 36.7 (27 Mar 2022 11:26)  T(F): 97.7 (28 Mar 2022 08:00), Max: 98 (27 Mar 2022 11:26)  HR: 76 (28 Mar 2022 08:00) (70 - 96)  BP: 111/68 (28 Mar 2022 08:00) (91/58 - 114/72)  BP(mean): --  RR: 18 (28 Mar 2022 08:00) (18 - 18)  SpO2: 96% (28 Mar 2022 08:00) (96% - 99%)    PHYSICAL EXAMINATION:    GENERAL: The patient is awake and alert in no apparent distress.     HEENT: Head is normocephalic and atraumatic. Extraocular muscles are intact. Mucous membranes are moist.    NECK: Supple.    LUNGS: Bibasilar rales otherwise CTA without wheezing or rhonchi; respirations unlabored    HEART: Regular rate and rhythm without murmur.    ABDOMEN: Soft, nontender, and nondistended.      EXTREMITIES: Without any cyanosis, clubbing, rash, lesions or edema.    NEUROLOGIC: Grossly intact.    LABS:                        12.2   15.70 )-----------( 181      ( 28 Mar 2022 03:49 )             39.0     03-28    142  |  103  |  28.5<H>  ----------------------------<  93  4.9   |  29.0  |  0.82    Ca    8.7      28 Mar 2022 03:49  Mg     2.2     03-27    TPro  6.8  /  Alb  3.5  /  TBili  0.3<L>  /  DBili  x   /  AST  13  /  ALT  7   /  AlkPhos  56  03-28        Serum Pro-Brain Natriuretic Peptide: 497 pg/mL (03-25-22 @ 23:22)      MICROBIOLOGY:    Flu With COVID-19 By ERMELINDA (03.25.22 @ 23:21)    SARS-CoV-2 Result: [facility]: This Respiratory Panel uses polymerase chain reaction (PCR) to detect for  influenza A; influenza B; respiratory syncytial virus; and SARS-CoV-2.  This test was validated by [facility] and is in use under the FDA  Emergency Use Authorization (EUA) for clinical labs CLIA-certified to  perform high complexity testing. Test results should be correlated with  clinical presentation, patient history, and epidemiology.    Influenza A Result: [facility]    [facility]    Resp Syn Virus Result: [facility]    Culture - Blood (03.26.22 @ 00:12)    Specimen Source: .Blood Blood-Peripheral    Culture Results:   No growth at 48 hours        RADIOLOGY & ADDITIONAL STUDIES:    ACC: [id number] EXAM:  CT CHEST                          PROCEDURE DATE:  03/26/2022          INTERPRETATION:  INDICATION: Pulmonary fibrosis, rule out pneumonia    TECHNIQUE: Volumetric images of the chest without intravenous contrast.   Maximum intensity projection images were generated.    COMPARISON: 11/7/2021.    FINDINGS:    LUNGS/AIRWAYS/PLEURA: Patent trachea and bronchi. Stable fibrosis with   diffuse traction bronchiectasis, basilar predominant, and some   predilection to the anterior upper lobes. Right lower lobe calcified   granuloma. Unremarkable pleura.    LYMPH NODES/MEDIASTINUM: Calcified lymph nodes consistent with prior   granulomatous disease.    HEART/VASCULATURE: Enlarged heart. No pericardial effusion. Approximate   4.1 cm midascending aorta. Enlarged main pulmonary artery at 3.6 cm.    UPPER ABDOMEN: Cholelithiasis.    BONES/SOFT TISSUES: Degenerative changes of the spine.      IMPRESSION:    Progressive fibrotic interstitial lung disease, similar to November 2021   and markedly increased since December 2017 at which time there was a   substantial groundglass component. Findings are suggestive of fibrotic   NSIP versus UIP pattern. Connective tissue disease associated ILD is a   consideration.          MARIE KIRBY M.D., ATTENDING RADIOLOGIST  This document has been electronically signed. Mar 26 2022  4:18PM      ECHO 11/13/21:    CONCLUSIONS:  1. Mitral annular calcification, otherwise normal mitral  valve. Mild mitral regurgitation.  2. Mildly calcified trileaflet aortic valve with normal  opening. Mild aortic regurgitation.  3. Normal left ventricular internal dimensions and wall  thicknesses.  4. Mild global left ventricular systolic dysfunction. LVEF  45-50%.  5. Mild diastolic dysfunction (Stage I).  6. The right ventricle is not well visualized; grossly  normal right ventricular systolic function.  7. Normal tricuspid valve. Mild tricuspid regurgitation.  8. Estimated pulmonary artery systolic pressure equals 55  mm Hg, assuming right atrial pressure equals 10  mm Hg,  consistent with moderate pulmonary hypertension.  *** No previous Echo exam.  ------------------------------------------------------------------------  Confirmed on  11/13/2021 - 12:16:58 by Ochoa Watson MD,  [facility], [facility], [facility]  ------------------------------------------------------------------------  
PULMONARY PROGRESS NOTE      [id number]    Patient is a 76y old  Male who presents with a chief complaint of SOB (26 Mar 2022 11:49)      INTERVAL HPI/OVERNIGHT EVENTS:  no overnight issues  no CP or SOB  MEDICATIONS  (STANDING):  doxycycline hyclate Capsule 100 milliGRAM(s) Oral every 12 hours  enoxaparin Injectable 30 milliGRAM(s) SubCutaneous every 24 hours  escitalopram 10 milliGRAM(s) Oral daily  famotidine    Tablet 20 milliGRAM(s) Oral two times a day  levETIRAcetam 500 milliGRAM(s) Oral two times a day  methylPREDNISolone sodium succinate Injectable 40 milliGRAM(s) IV Push every 6 hours  montelukast 10 milliGRAM(s) Oral daily  multivitamin 1 Tablet(s) Oral daily  tamsulosin 0.4 milliGRAM(s) Oral at bedtime      MEDICATIONS  (PRN):  acetaminophen     Tablet .. 650 milliGRAM(s) Oral every 6 hours PRN Temp greater or equal to 38C (100.4F), Mild Pain (1 - 3)  ALBUTerol    0.083% 2.5 milliGRAM(s) Nebulizer every 6 hours PRN Bronchospasm  benzonatate 100 milliGRAM(s) Oral every 8 hours PRN Cough  guaiFENesin Oral Liquid (Sugar-Free) 100 milliGRAM(s) Oral every 6 hours PRN Cough      Allergies    No Known Allergies    Intolerances        PAST MEDICAL & SURGICAL HISTORY:  Hypertension    BPH (benign prostatic hypertrophy)    HTN (hypertension)    Benign prostate hyperplasia    GERD (gastroesophageal reflux disease)    Depression, major    H/O interstitial lung disease  on 4l via nc    No significant past surgical history    No significant past surgical history        SOCIAL HISTORY  Smoking History:       REVIEW OF SYSTEMS:    CONSTITUTIONAL:  No distress    HEENT:  Eyes:  No diplopia or blurred vision. ENT:  No earache, sore throat or runny nose.    CARDIOVASCULAR:  No pressure, squeezing, tightness, heaviness or aching about the chest; no palpitations.    RESPIRATORY:  see HPI    GASTROINTESTINAL:  No nausea, vomiting or diarrhea.    GENITOURINARY:  No dysuria, frequency or urgency.    NEUROLOGIC:  No paresthesias, fasciculations, seizures or weakness.    PSYCHIATRIC:  No disorder of thought or mood.    Vital Signs Last 24 Hrs  T(C): 36.5 (27 Mar 2022 07:58), Max: 36.5 (27 Mar 2022 07:58)  T(F): 97.7 (27 Mar 2022 07:58), Max: 97.7 (27 Mar 2022 07:58)  HR: 73 (27 Mar 2022 07:58) (73 - 89)  BP: 105/66 (27 Mar 2022 07:58) (99/65 - 112/70)  BP(mean): --  RR: 18 (27 Mar 2022 07:58) (18 - 20)  SpO2: 98% (27 Mar 2022 07:58) (97% - 100%)    PHYSICAL EXAMINATION:    GENERAL: The patient is awake and alert in no apparent distress.     HEENT: Head is normocephalic and atraumatic. Extraocular muscles are intact. Mucous membranes are moist.    NECK: Supple.    LUNGS: moderate aair entry, crackles L base ; respirations unlabored    HEART: Regular rate and rhythm without murmur.    ABDOMEN: Soft, nontender, and nondistended.      EXTREMITIES: Without any cyanosis, clubbing, rash, lesions or edema.    NEUROLOGIC: Grossly intact.    LABS:                        12.3   12.34 )-----------( 177      ( 27 Mar 2022 03:23 )             37.8     03-27    140  |  101  |  18.1  ----------------------------<  143<H>  4.7   |  29.0  |  0.69    Ca    8.7      27 Mar 2022 03:23  Mg     2.2     03-27    TPro  7.0  /  Alb  3.5  /  TBili  0.4  /  DBili  x   /  AST  13  /  ALT  7   /  AlkPhos  60  03-27        ABG - ( 26 Mar 2022 05:31 )  pH, Arterial: 7.430 pH, Blood: x     /  pCO2: 44    /  pO2: 149   / HCO3: 29    / Base Excess: 4.9   /  SaO2: 100.0             CARDIAC MARKERS ( 25 Mar 2022 23:22 )  x     / <0.01 ng/mL / 59 U/L / x     / x          D-Dimer Assay, Quantitative: <150 ng/mL DDU (03-27-22 @ 03:23)    Serum Pro-Brain Natriuretic Peptide: 497 pg/mL (03-25-22 @ 23:22)          MICROBIOLOGY:    RADIOLOGY & ADDITIONAL STUDIES:  CT scan reviewed

## 2022-03-29 NOTE — PROGRESS NOTE ADULT - NUTRITIONAL ASSESSMENT
This patient has been assessed with a concern for Malnutrition and has been determined to have a diagnosis/diagnoses of Moderate protein-calorie malnutrition.    This patient is being managed with:   Diet Pureed-  DASH/TLC {Sodium & Cholesterol Restricted} (DASH)  Moderately Thick Liquids (MODTHICKLIQS)  Entered: Mar 26 2022  3:40AM

## 2022-03-29 NOTE — DIETITIAN INITIAL EVALUATION ADULT. - OTHER INFO
Pt with h/o ILD (on 4L NC home O2), COPD, HTN, Depression, BPH, GERD, seizures who was sent from [facility] for worsening hypoxia.  Per ED and triage notes the pt was not using o2 at the NH, but per the NH the pt was on his 4l nc and became hypoxic with increased WOB. Pt reports increased phlegm production than baseline and worsening sob. CXR noted with progression of ILD, unable to rule out infectious process so CT chest ordered. Pt admitted with acute on chronic resp failure with hypoxia 2/2 ILD exacerbation.

## 2022-03-29 NOTE — PROGRESS NOTE ADULT - ASSESSMENT
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 antifibrotic trial as outpt  Overall appears more palliative  Mobilize as jonathon  F/u with Dr. Nesbitt after d/c  
77 yo male w/ hx of ILD (on 4L NC home O2), COPD, HTN, Depression, BPH, GERD, seizures who was sent from [facility] for worsening hypoxia.  Per ED and triage notes the pt was not using o2 at the NH, but per the NH the pt was on his 4l nc and became hypoxic with increased WOB. Pt reports increased phlegm production than baseline and worsening sob. CXR noted with progression of ILD, unable to rule out infectious process so CT chest ordered. Pt admitted with acute on chronic resp failure with hypoxia 2/2 ILD exacerbation.     Admit to telemetry       Acute on chronic respiratory failure with hypoxia 2/2 progressive ILD and currently in ILD exacerbation  - underlying hx COPD former smoker   - currently with tachypnea, per ED record and triage pt was not using o2 at the NH. Per NH the pt was hypoxic with baseline 4l nc and that was why he was transferred to [facility]   - cxr noted preliminary reading RML with noted increased density possibly from progression of ILD  - CT chest- diffuse pulmonary fibrosis  -c/w solumedrol 40mg IV q6hrs  -c/w duoneb q6hrs, c/w mucomyst x 3 doses, robitussin prn and tessalon pearls prn   - will empirically tx with azitrhomycin   - Pulm c/s placed (Dr. Zeng)    HTN   -sbp currently 100 range  - low/normal bp  - holding Atenolol 25 mg daily for now and monitor bp. If bp improves then will restart.     Seizures   Stable  -Cont Keppra 500mg po bid  - f/u keppra level   -c/w Aspiration precautions, seizure precautions     BPH without urinary obstruction    No S/S of urinary retention  -Cont flomax po qhs      Depression   Stable  -Cont escitalopram 10mg po qd     GERD    -c/w pepcid po bid     Severe protein calorie malnutrition  - the pt is on pureed modified diet with thickened liquids  - nutrition consulted     DVT Prophylaxis  -c/w  Lovenox sq qd    CODE STATUS- FULL CODE per patient on 3/26.   Advanced directives: Per [facility] pt has a MOLST/ form that states he is DNR/DNI, paper chart does not have any paper work from [facility]. Called NH and requested fax back with paper work. NH read me the pts med list for now. Pt is AAOX3 d/w him with inhouse  at bedside and he said that he was sick and the only family member available was his niece and she must have filled out the paper for him. At this time he reports he wants to be a full code would want all aggressive measures. He also made it clear that if something happens to him his next of kin are his two daughters not his niece. Adonisromain and Viri Bryant (daughters). Refer to [facility]  Palliative care consulted as pt has progressively worsening ILD and would benefit from further advanced directive discussion and family involvement       Dispo: Pt resides in [facility] currently remains medically acute requiring inpt management. F/u palliative c/s.   Updated patient's niece, Safai (power of )- [phone] on 3/26.  
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 antifibrotic trial as outpt  Overall appears more palliative  Mobilize as jonathon  F/u with Dr. Nesbitt after d/c    d/w NP
77 yo male w/ hx of ILD (on 4L NC home O2), COPD, HTN, Depression, BPH, GERD, seizures who was sent from [facility] for worsening hypoxia.  Per ED and triage notes the pt was not using o2 at the NH, but per the NH the pt was on his 4l nc and became hypoxic with increased WOB. Pt reports increased phlegm production than baseline and worsening sob. CXR noted with progression of ILD, unable to rule out infectious process so CT chest ordered. Pt admitted with acute on chronic resp failure with hypoxia 2/2 ILD exacerbation.     Admit to telemetry       Acute on chronic respiratory failure with hypoxia 2/2 progressive ILD and currently in ILD exacerbation  - underlying hx COPD former smoker   - currently with tachypnea, per ED record and triage pt was not using o2 at the NH. Per NH the pt was hypoxic with baseline 4l nc and that was why he was transferred to [facility]   - cxr noted preliminary reading RML with noted increased density possibly from progression of ILD  - CT chest- diffuse pulmonary fibrosis  - Weaned to solumedrol 40mg IV q12hrs. Wean as tolerated. C/w doxy  -c/w duoneb q6hrs, robitussin prn and tessalon pearls prn   - Pulm c/s placed (Dr. Zeng)- will need outpatient antifibrotic trial as outpatient    HTN   -sbp currently 100 range  - low/normal bp  - holding Atenolol 25 mg daily for now and monitor bp. If bp improves then will restart.     Seizures   Stable  -Cont Keppra 500mg po bid  -c/w Aspiration precautions, seizure precautions     BPH without urinary obstruction    No S/S of urinary retention  -Cont flomax po qhs      Depression   Stable  -Cont escitalopram 10mg po qd     GERD    -c/w pepcid po bid     Severe protein calorie malnutrition  - the pt is on pureed modified diet with thickened liquids  - nutrition consulted     DVT Prophylaxis  -c/w  Lovenox sq qd    CODE STATUS- FULL CODE per patient on 3/26.   Advanced directives: Per [facility] pt has a MOLST/ form that states he is DNR/DNI, paper chart does not have any paper work from [facility]. Called NH and requested fax back with paper work. NH read me the pts med list for now. Pt is AAOX3 d/w him with inhouse  at bedside and he said that he was sick and the only family member available was his niece and she must have filled out the paper for him. At this time he reports he wants to be a full code would want all aggressive measures. He also made it clear that if something happens to him his next of kin are his two daughters not his niece. Reginaldo and Vrii Bryant (daughters). Refer to [facility]  Palliative care consulted as pt has progressively worsening ILD and would benefit from further advanced directive discussion and family involvement   - Appreciate  recs- Patient has a capacity to make decisions about his health care recs      Dispo: Pt resides in [facility] currently remains medically acute requiring inpt management. PT- Will return back to NH  Updated Safia (power of )- [phone] on 3/28.  
77 yo male w/ hx of ILD (on 4L NC home O2), COPD, HTN, Depression, BPH, GERD, seizures who was sent from [facility] for worsening hypoxia.  Per ED and triage notes the pt was not using o2 at the NH, but per the NH the pt was on his 4l nc and became hypoxic with increased WOB. Pt reports increased phlegm production than baseline and worsening sob. CXR noted with progression of ILD, unable to rule out infectious process so CT chest ordered. Pt admitted with acute on chronic resp failure with hypoxia 2/2 ILD exacerbation.     Admit to telemetry       Acute on chronic respiratory failure with hypoxia 2/2 progressive ILD and currently in ILD exacerbation  - underlying hx COPD former smoker   - currently with tachypnea, per ED record and triage pt was not using o2 at the NH. Per NH the pt was hypoxic with baseline 4l nc and that was why he was transferred to [facility]   - cxr noted preliminary reading RML with noted increased density possibly from progression of ILD  - CT chest- diffuse pulmonary fibrosis  - Weaned to solumedrol 40mg IV QD. Wean as tolerated. C/w doxy and decrease by 5 mg every 2 days.   -c/w duoneb q6hrs, robitussin prn and tessalon pearls prn   - Pulm c/s placed (Dr. Zeng)- will need outpatient antifibrotic trial as outpatient    HTN   -sbp currently 100 range  - low/normal bp  - holding Atenolol 25 mg daily for now and monitor bp. If bp improves then will restart.     Seizures   Stable  -Cont Keppra 500mg po bid  -c/w Aspiration precautions, seizure precautions     BPH without urinary obstruction    No S/S of urinary retention  -Cont flomax po qhs      Depression   Stable  -Cont escitalopram 10mg po qd     GERD    -c/w pepcid po bid     Severe protein calorie malnutrition  - the pt is on pureed modified diet with thickened liquids  - nutrition consulted     DVT Prophylaxis  -c/w  Lovenox sq qd    CODE STATUS- FULL CODE per patient on 3/26.   Advanced directives: Per [facility] pt has a MOLST/ form that states he is DNR/DNI, paper chart does not have any paper work from [facility]. Called NH and requested fax back with paper work. NH read me the pts med list for now. Pt is AAOX3 d/w him with inhouse  at bedside and he said that he was sick and the only family member available was his niece and she must have filled out the paper for him. At this time he reports he wants to be a full code would want all aggressive measures. He also made it clear that if something happens to him his next of kin are his two daughters not his niece. Reginaldo and Viri Bryant (daughters). Refer to [facility]  Palliative care consulted as pt has progressively worsening ILD and would benefit from further advanced directive discussion and family involvement   - Appreciate  recs- Patient has a capacity to make decisions about his health care recs      Dispo: Pt resides in [facility] currently remains medically acute requiring inpt management. PT- Will return back to NH. Likely discharge tomorrow 3/30. Patient is on 4L NC at baseline.   Unable to reach Safia (power of )- [phone] on 3/29.  
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 antifibrotic trial  Overall appears more palliative
75 yo male w/ hx of ILD (on 4L NC home O2), COPD, HTN, Depression, BPH, GERD, seizures who was sent from [facility] for worsening hypoxia.  Per ED and triage notes the pt was not using o2 at the NH, but per the NH the pt was on his 4l nc and became hypoxic with increased WOB. Pt reports increased phlegm production than baseline and worsening sob. CXR noted with progression of ILD, unable to rule out infectious process so CT chest ordered. Pt admitted with acute on chronic resp failure with hypoxia 2/2 ILD exacerbation.     Admit to telemetry       Acute on chronic respiratory failure with hypoxia 2/2 progressive ILD and currently in ILD exacerbation  - underlying hx COPD former smoker   - currently with tachypnea, per ED record and triage pt was not using o2 at the NH. Per NH the pt was hypoxic with baseline 4l nc and that was why he was transferred to [facility]   - cxr noted preliminary reading RML with noted increased density possibly from progression of ILD  - CT chest- diffuse pulmonary fibrosis  - Switched to solumedrol 40mg IV q8hrs. Wean as tolerated  -c/w duoneb q6hrs, c/w mucomyst x 3 doses, robitussin prn and tessalon pearls prn   - will empirically tx with azitrhomycin   - Pulm c/s placed (Dr. Zeng)    HTN   -sbp currently 100 range  - low/normal bp  - holding Atenolol 25 mg daily for now and monitor bp. If bp improves then will restart.     Seizures   Stable  -Cont Keppra 500mg po bid  -c/w Aspiration precautions, seizure precautions     BPH without urinary obstruction    No S/S of urinary retention  -Cont flomax po qhs      Depression   Stable  -Cont escitalopram 10mg po qd     GERD    -c/w pepcid po bid     Severe protein calorie malnutrition  - the pt is on pureed modified diet with thickened liquids  - nutrition consulted     DVT Prophylaxis  -c/w  Lovenox sq qd    CODE STATUS- FULL CODE per patient on 3/26.   Advanced directives: Per [facility] pt has a MOLST/ form that states he is DNR/DNI, paper chart does not have any paper work from [facility]. Called NH and requested fax back with paper work. NH read me the pts med list for now. Pt is AAOX3 d/w him with inhouse  at bedside and he said that he was sick and the only family member available was his niece and she must have filled out the paper for him. At this time he reports he wants to be a full code would want all aggressive measures. He also made it clear that if something happens to him his next of kin are his two daughters not his niece. Adonisromain and Viri Bryant (daughters). Refer to [facility]  Palliative care consulted as pt has progressively worsening ILD and would benefit from further advanced directive discussion and family involvement       Dispo: Pt resides in [facility] currently remains medically acute requiring inpt management. F/u palliative c/s.   Updated Safia (power of  and HCP)- [phone] on 3/26.  
75yo M with progressive ILD, chronic respiratory failure on home O2, COPD, former smoker, HTN, depression, seizure admitted for acute respiratory failure 2/2 ILD exacerbation.     PLAN    Acute on Chronic Respiratory Failure  Progressive ILD  COPD/Former Smoker  - CT with progressive fibrotic ILD similar to Nov 2021  - pulmonary following  - c/w O2 supplement (at 4L NC baseline), antibiotic, corticosteroids     Cachexia/Protein Calorie Malnutrition   - recommend nutrition eval and ensure supplements to increase caloric intake    Depression  - on escitalopram    Debility   - supportive care, fall precaution, PT     Advance Care Planning  Palliative Care Encounter  - see goc above    Provided information to HCP niece for down the road including contact number for [facility] should he decline further. Encouraged her to have ongoing goc and directive discussions with pt and rest of family to ensure every one is on the same page regarding goc.     No further recommendations from a palliative standpoint. Will sign off. Please reconsult PRN  Dispo planning per CCC. Ongoing medical mgnt per primary team.

## 2022-03-29 NOTE — DIETITIAN INITIAL EVALUATION ADULT. - ETIOLOGY
related to inability to consume increased protein energy intake in setting of ILD, COPD exacerbation

## 2022-03-29 NOTE — SWALLOW BEDSIDE ASSESSMENT ADULT - SWALLOW EVAL: DIAGNOSIS
Pt presents w/functional oropharyngeal swallow, with reported baseline diet of puree & moderately thick liquids. No overt s/s penetration or aspiration observed. SLP to call [facility] to clarify diet order & re-assess as appropriate.

## 2022-03-29 NOTE — DIETITIAN INITIAL EVALUATION ADULT. - PERTINENT LABORATORY DATA
03-29 Na140 mmol/L Glu 105 mg/dL<H> K+ 4.9 mmol/L Cr  0.65 mg/dL BUN 26.4 mg/dL<H> Phos n/a   Alb 3.5 g/dL PAB n/a

## 2022-03-29 NOTE — PROGRESS NOTE ADULT - CONVERSATION DETAILS
Called and spoke with niece/HCP Safia to introduce palliative care service. Lala has medical background, is a caregiver and works with a hospice agency in [facility].     Safia expresses good insight into her uncle's complex comorbidities especially interstitial lung disease. She is aware its a progressive disease as seen by her uncle's progressive decline. She has tried to explain this to the patient, her father (pt's brother) and pt's two other daughters. Maddyece shared that there is a complex family dynamic with pt's two daughter who appear to have limited involvement in his care.     Niece wants to respect uncle's wish to continue with current active treatment but aware that he is at risk of recurrent hospitalizations/infections. Writer explained concern that uncle is not fully grasping the scope and severity of his chronic illnesses. Encouraged ongoing discussions.     We also explored options including but not limited to hospice services for down the road at [place]. She is familiar with hospice philosophy of care. Writer provided her with contact number for hospice care network in the event uncle's health continues to progressive decline and they wish to pursue hospice eval at that time.     Advance directives and HCP discussed. Advised Lala that pt had medical decision making capacity to complete a HCP with team yesterday, completed and designated to Lala. Safia is accepting of the role of proxy. She feels that DNR/DNI is appropriate but will respect uncle's wish for full code. She plans to continue this conversation when he returns back to NH.     Lala was appreciative of phone call. Emotional support provided and questions answered.

## 2022-03-29 NOTE — PROGRESS NOTE ADULT - TIME BILLING
D/W pt, juan josé Baig RN, NP MARK Bird, Hospitalist Dr. Holland  Chart review, meds, labs, imaging
greater than 50% of time spent reviewing labs, notes, orders and radiographs, coordinating care

## 2022-03-29 NOTE — SWALLOW BEDSIDE ASSESSMENT ADULT - SLP PERTINENT HISTORY OF CURRENT PROBLEM
As per MD note, "75yo M with progressive ILD, chronic respiratory failure on home O2, COPD, former smoker, HTN, depression, seizure admitted for acute respiratory failure 2/2 ILD exacerbation".

## 2022-03-29 NOTE — SWALLOW BEDSIDE ASSESSMENT ADULT - SLP GENERAL OBSERVATIONS
Recd Recd awake/upright in bed, A&A Ox2, reduced cognition, +Nauruan speaking language line ID @ [id number] utilized, 0/10 pain pre/post, 4L NC SPO2 98%

## 2022-03-29 NOTE — SWALLOW BEDSIDE ASSESSMENT ADULT - COMMENTS
No further trials provided, pending confirmation of baseline diet from [facility]

## 2022-03-29 NOTE — SWALLOW BEDSIDE ASSESSMENT ADULT - DIET PRIOR TO ADMI
As per EMR, "puree w/thickened liquids". Attempt to clarify w/Pt, states he "eats everything" though questionable historian

## 2022-03-29 NOTE — DIETITIAN INITIAL EVALUATION ADULT. - ORAL INTAKE PTA/DIET HISTORY
Pt reports good po intake at meals; consumed 75% at breakfast this morning per tray observation and is requesting more food at meals.  Pt states good appetite/po intake prior to admission, but unsure about any wt loss.  Pt states UBW ~130 lbs; current documented wt 113 lbs- if accurate.  Discussed importance of consuming adequate protein intake at meals to optimize nutrition status.  Pt receptive and agreeable to receive Ensure with meals.

## 2022-03-30 ENCOUNTER — TRANSCRIPTION ENCOUNTER (OUTPATIENT)
Age: 77
End: 2022-03-30

## 2022-03-30 VITALS
RESPIRATION RATE: 22 BRPM | HEART RATE: 78 BPM | DIASTOLIC BLOOD PRESSURE: 74 MMHG | OXYGEN SATURATION: 98 % | TEMPERATURE: 98 F | SYSTOLIC BLOOD PRESSURE: 116 MMHG

## 2022-03-30 PROCEDURE — 80177 DRUG SCRN QUAN LEVETIRACETAM: CPT

## 2022-03-30 PROCEDURE — 99497 ADVNCD CARE PLAN 30 MIN: CPT | Mod: 25

## 2022-03-30 PROCEDURE — 87040 BLOOD CULTURE FOR BACTERIA: CPT

## 2022-03-30 PROCEDURE — 85025 COMPLETE CBC W/AUTO DIFF WBC: CPT

## 2022-03-30 PROCEDURE — U0005: CPT

## 2022-03-30 PROCEDURE — 99233 SBSQ HOSP IP/OBS HIGH 50: CPT

## 2022-03-30 PROCEDURE — 92610 EVALUATE SWALLOWING FUNCTION: CPT

## 2022-03-30 PROCEDURE — 84134 ASSAY OF PREALBUMIN: CPT

## 2022-03-30 PROCEDURE — 82550 ASSAY OF CK (CPK): CPT

## 2022-03-30 PROCEDURE — U0003: CPT

## 2022-03-30 PROCEDURE — 92526 ORAL FUNCTION THERAPY: CPT

## 2022-03-30 PROCEDURE — 96374 THER/PROPH/DIAG INJ IV PUSH: CPT

## 2022-03-30 PROCEDURE — 83880 ASSAY OF NATRIURETIC PEPTIDE: CPT

## 2022-03-30 PROCEDURE — 80053 COMPREHEN METABOLIC PANEL: CPT

## 2022-03-30 PROCEDURE — 84484 ASSAY OF TROPONIN QUANT: CPT

## 2022-03-30 PROCEDURE — 85027 COMPLETE CBC AUTOMATED: CPT

## 2022-03-30 PROCEDURE — 94640 AIRWAY INHALATION TREATMENT: CPT

## 2022-03-30 PROCEDURE — 82803 BLOOD GASES ANY COMBINATION: CPT

## 2022-03-30 PROCEDURE — 87637 SARSCOV2&INF A&B&RSV AMP PRB: CPT

## 2022-03-30 PROCEDURE — 96375 TX/PRO/DX INJ NEW DRUG ADDON: CPT

## 2022-03-30 PROCEDURE — 71045 X-RAY EXAM CHEST 1 VIEW: CPT

## 2022-03-30 PROCEDURE — 99285 EMERGENCY DEPT VISIT HI MDM: CPT | Mod: 25

## 2022-03-30 PROCEDURE — 85379 FIBRIN DEGRADATION QUANT: CPT

## 2022-03-30 PROCEDURE — 97163 PT EVAL HIGH COMPLEX 45 MIN: CPT

## 2022-03-30 PROCEDURE — 36415 COLL VENOUS BLD VENIPUNCTURE: CPT

## 2022-03-30 PROCEDURE — 83735 ASSAY OF MAGNESIUM: CPT

## 2022-03-30 PROCEDURE — 99239 HOSP IP/OBS DSCHRG MGMT >30: CPT

## 2022-03-30 PROCEDURE — 71250 CT THORAX DX C-: CPT | Mod: MA

## 2022-03-30 RX ORDER — ACETAMINOPHEN 500 MG
2 TABLET ORAL
Qty: 0 | Refills: 0 | DISCHARGE
Start: 2022-03-30

## 2022-03-30 RX ORDER — POLYETHYLENE GLYCOL 3350 17 G/17G
17 POWDER, FOR SOLUTION ORAL
Qty: 0 | Refills: 0 | DISCHARGE
Start: 2022-03-30

## 2022-03-30 RX ADMIN — POLYETHYLENE GLYCOL 3350 17 GRAM(S): 17 POWDER, FOR SOLUTION ORAL at 05:21

## 2022-03-30 RX ADMIN — Medication 40 MILLIGRAM(S): at 05:20

## 2022-03-30 RX ADMIN — MONTELUKAST 10 MILLIGRAM(S): 4 TABLET, CHEWABLE ORAL at 12:20

## 2022-03-30 RX ADMIN — ESCITALOPRAM OXALATE 10 MILLIGRAM(S): 10 TABLET, FILM COATED ORAL at 12:20

## 2022-03-30 RX ADMIN — LEVETIRACETAM 500 MILLIGRAM(S): 250 TABLET, FILM COATED ORAL at 05:20

## 2022-03-30 RX ADMIN — FAMOTIDINE 20 MILLIGRAM(S): 10 INJECTION INTRAVENOUS at 05:20

## 2022-03-30 RX ADMIN — Medication 100 MILLIGRAM(S): at 05:20

## 2022-03-30 RX ADMIN — Medication 1 TABLET(S): at 12:19

## 2022-03-30 NOTE — DISCHARGE NOTE PROVIDER - DISCHARGING ATTENDING PHYSICIAN:
[FreeTextEntry1] : Follow-up with primary care doctor.  Regarding decision on antihypertensive.\par Follow-up in our office on a PRN basis.  Will return should cough increase.
Dr. Alfonso Holland

## 2022-03-30 NOTE — DISCHARGE NOTE PROVIDER - CARE PROVIDER_API CALL
Fortunato Zeng (DO)  Critical Care Medicine; Internal Medicine; Pulmonary Disease  [address] [place] [address]  Phone: [phone]  Fax: [phone]  Follow Up Time:

## 2022-03-30 NOTE — DISCHARGE NOTE PROVIDER - NSDCCPCAREPLAN_GEN_ALL_CORE_FT
PRINCIPAL DISCHARGE DIAGNOSIS  Diagnosis: COPD exacerbation  Assessment and Plan of Treatment: Completes steroid taper as prescribed  Complete course of antibiotics, end date 3/31.  Supplemental oxygen   Home inhalers      SECONDARY DISCHARGE DIAGNOSES  Diagnosis: Pulmonary fibrosis  Assessment and Plan of Treatment: Follow up with your Pulmonologist for further management/ antifibrotic trial as outpatient

## 2022-03-30 NOTE — DISCHARGE NOTE PROVIDER - HOSPITAL COURSE
77 yo male w/ hx of ILD (on 4L NC home O2), COPD, HTN, Depression, BPH, GERD, seizures who was sent from [facility] for worsening hypoxia.  Per ED and triage notes the pt was not using o2 at the NH, but per the NH the pt was on his 4l nc and became hypoxic with increased WOB. Pt reports increased phlegm production than baseline and worsening sob. CXR noted with progression of ILD, unable to rule out infectious process so CT chest ordered. Pt admitted with acute on chronic resp failure with hypoxia 2/2 ILD exacerbation. Patient followed by Pulmonologist. CT chest- diffuse pulmonary fibrosis. Received IV steroids, changed to oral for taper. As per Pulmonologist, will need outpatient antifibrotic trial as outpatient. Seen by SLP, dysphagia diet maintained. Seen by Palliative Care, patient has named niece as HCP. The patient is medically stable for discharge.   Vital Signs Last 24 Hrs  T(C): 36.2 (30 Mar 2022 08:22), Max: 36.8 (29 Mar 2022 21:20)  T(F): 97.2 (30 Mar 2022 08:22), Max: 98.3 (29 Mar 2022 21:20)  HR: 109 (30 Mar 2022 08:22) (67 - 109)  BP: 104/66 (30 Mar 2022 08:22) (100/66 - 115/64)  BP(mean): --  RR: 20 (30 Mar 2022 08:22) (18 - 20)  SpO2: 92% (30 Mar 2022 08:22) (92% - 99%)                            13.2   9.89  )-----------( 165      ( 29 Mar 2022 07:21 )             43.0     29 Mar 2022 07:21    140    |  100    |  26.4   ----------------------------<  105    4.9     |  34.0   |  0.65     Ca    8.6        29 Mar 2022 07:21    TPro  6.8    /  Alb  3.5    /  TBili  0.5    /  DBili  x      /  AST  12     /  ALT  8      /  AlkPhos  54     29 Mar 2022 07:21      CAPILLARY BLOOD GLUCOSE        LIVER FUNCTIONS - ( 29 Mar 2022 07:21 )  Alb: 3.5 g/dL / Pro: 6.8 g/dL / ALK PHOS: 54 U/L / ALT: 8 U/L / AST: 12 U/L / GGT: x           PE:  CONSTITUTIONAL: NAD, well-developed  RESPIRATORY: Normal respiratory effort; lungs are clear to auscultation bilaterally  CARDIOVASCULAR: Regular rate and rhythm, normal S1 and S2  ABDOMEN: Nontender to palpation, normoactive bowel sounds  PSYCH: A+O to person, place, and time; affect appropriate  NEUROLOGY: CN 2-12 are intact and symmetric; no gross sensory deficits;   SKIN: No rashes; no palpable lesions   77 yo male w/ hx of ILD (on 4L NC home O2), COPD, HTN, Depression, BPH, GERD, seizures who was sent from [facility] for worsening hypoxia.  Per ED and triage notes the pt was not using o2 at the NH, but per the NH the pt was on his 4l nc and became hypoxic with increased WOB. Pt reports increased phlegm production than baseline and worsening sob. CXR noted with progression of ILD, unable to rule out infectious process so CT chest ordered. Pt admitted with acute on chronic resp failure with hypoxia 2/2 ILD exacerbation. Patient followed by Pulmonologist. CT chest- diffuse pulmonary fibrosis. Received IV steroids, changed to oral for taper. As per Pulmonologist, will need outpatient antifibrotic trial as outpatient. Seen by SLP, dysphagia diet maintained. Seen by Palliative Care, patient has named niece as HCP. The patient is medically stable for discharge.

## 2022-03-30 NOTE — DISCHARGE NOTE PROVIDER - NSDCFUADDAPPT_GEN_ALL_CORE_FT
STAR:  Patient is from [facility] - TO RETURN [facility]   Patient has a prescheduled appt with pulmonologist Dr Tl Nesbitt on 04/15/2022 at 10:30 AM . Phone [phone] . Address:  [address] [place] [address]

## 2022-03-30 NOTE — DISCHARGE NOTE PROVIDER - ATTENDING DISCHARGE PHYSICAL EXAMINATION:
Vital Signs Last 24 Hrs  T(F): 97.2 (30 Mar 2022 08:22), Max: 98.3 (29 Mar 2022 21:20)  HR: 109 (30 Mar 2022 08:22) (67 - 109)  BP: 104/66 (30 Mar 2022 08:22) (100/66 - 115/64)  RR: 20 (30 Mar 2022 08:22) (18 - 20)  SpO2: 92% (30 Mar 2022 08:22) (92% - 99%)    Physical Exam:  Constitutional: NAD, awake and alert, well-developed  Neck: Soft and supple, No LAD, No JVD  Respiratory: cta b/l no wheezing no rhonchi  Cardiovascular: +s1/s2 no edema b/l le  Gastrointestinal: soft nt nd bs+  Vascular: 2+ peripheral pulses  Neurological: A/O x 3, no focal deficits

## 2022-03-30 NOTE — DISCHARGE NOTE PROVIDER - NSDCMRMEDTOKEN_GEN_ALL_CORE_FT
acetaminophen 325 mg oral tablet: 2 tab(s) orally every 6 hours, As needed, Temp greater or equal to 38C (100.4F), Mild Pain (1 - 3)  atenolol 25 mg oral tablet: 1 tab(s) orally 2 times a day   benzonatate 100 mg oral capsule: 1 cap(s) orally every 8 hours, As needed, Cough  doxycycline monohydrate 100 mg oral capsule: 1 cap(s) orally every 12 hours end date 3/31/22  escitalopram 10 mg oral tablet: 1 tab(s) orally once a day  famotidine 20 mg oral tablet: 1 tab(s) orally 2 times a day   guaiFENesin 100 mg/5 mL oral liquid: 5 milliliter(s) orally every 6 hours, As needed, Cough  Keppra 500 mg oral tablet: 1 tab(s) orally 2 times a day   montelukast 10 mg oral tablet: 1 tab(s) orally once a day   Multiple Vitamins oral tablet: 1 tab(s) orally once a day  polyethylene glycol 3350 oral powder for reconstitution: 17 gram(s) orally 2 times a day  predniSONE 10 mg oral tablet: take 4 tabs oral x 2 days then decrease by 5 mg everey 2 days then stop  tamsulosin 0.4 mg oral capsule: 1 cap(s) orally once a day   Trelegy Ellipta 200 mcg-62.5 mcg-25 mcg/inh inhalation powder: 1 puff(s) inhaled once a day  Ventolin HFA 90 mcg/inh inhalation aerosol: 2 puff(s) inhaled every 6 hours, As Needed

## 2022-03-31 LAB
CULTURE RESULTS: SIGNIFICANT CHANGE UP
CULTURE RESULTS: SIGNIFICANT CHANGE UP
SPECIMEN SOURCE: SIGNIFICANT CHANGE UP
SPECIMEN SOURCE: SIGNIFICANT CHANGE UP

## 2022-04-01 LAB — LEVETIRACETAM SERPL-MCNC: 41.6 UG/ML — HIGH (ref 10–40)

## 2022-04-15 ENCOUNTER — APPOINTMENT (OUTPATIENT)
Dept: PULMONOLOGY | Facility: CLINIC | Age: 77
End: 2022-04-15
Payer: MEDICARE

## 2022-04-15 VITALS
SYSTOLIC BLOOD PRESSURE: 92 MMHG | DIASTOLIC BLOOD PRESSURE: 58 MMHG | RESPIRATION RATE: 18 BRPM | OXYGEN SATURATION: 98 % | HEART RATE: 78 BPM | HEIGHT: 63 IN

## 2022-04-15 VITALS — BODY MASS INDEX: 18.95 KG/M2 | WEIGHT: 107 LBS

## 2022-04-15 DIAGNOSIS — Z09 ENCOUNTER FOR FOLLOW-UP EXAMINATION AFTER COMPLETED TREATMENT FOR CONDITIONS OTHER THAN MALIGNANT NEOPLASM: ICD-10-CM

## 2022-04-15 PROCEDURE — 99215 OFFICE O/P EST HI 40 MIN: CPT

## 2022-04-15 RX ORDER — PREDNISONE 10 MG/1
10 TABLET ORAL DAILY
Qty: 90 | Refills: 3 | Status: ACTIVE | COMMUNITY
Start: 2022-04-15 | End: 1900-01-01

## 2022-06-03 ENCOUNTER — APPOINTMENT (OUTPATIENT)
Dept: PULMONOLOGY | Facility: CLINIC | Age: 77
End: 2022-06-03
Payer: MEDICARE

## 2022-06-03 VITALS — OXYGEN SATURATION: 97 %

## 2022-06-03 VITALS
OXYGEN SATURATION: 98 % | HEART RATE: 61 BPM | DIASTOLIC BLOOD PRESSURE: 72 MMHG | RESPIRATION RATE: 14 BRPM | SYSTOLIC BLOOD PRESSURE: 114 MMHG

## 2022-06-03 DIAGNOSIS — G47.33 OBSTRUCTIVE SLEEP APNEA (ADULT) (PEDIATRIC): ICD-10-CM

## 2022-06-03 DIAGNOSIS — J98.2 INTERSTITIAL EMPHYSEMA: ICD-10-CM

## 2022-06-03 PROCEDURE — 99214 OFFICE O/P EST MOD 30 MIN: CPT

## 2022-06-03 RX ORDER — LEVETIRACETAM 500 MG/1
500 TABLET, FILM COATED ORAL
Qty: 60 | Refills: 0 | Status: ACTIVE | COMMUNITY
Start: 2022-05-25

## 2022-06-03 RX ORDER — DOXAZOSIN 8 MG/1
8 TABLET ORAL
Refills: 0 | Status: DISCONTINUED | COMMUNITY
End: 2022-06-03

## 2022-06-03 RX ORDER — LEVOTHYROXINE SODIUM 0.17 MG/1
175 TABLET ORAL
Refills: 0 | Status: DISCONTINUED | COMMUNITY
End: 2022-06-03

## 2022-06-03 RX ORDER — MIRTAZAPINE 15 MG/1
15 TABLET, FILM COATED ORAL
Qty: 30 | Refills: 0 | Status: ACTIVE | COMMUNITY
Start: 2022-05-30

## 2022-06-03 RX ORDER — METHYLPREDNISOLONE SODIUM SUCCINATE 125 MG/2ML
125 INJECTION, POWDER, FOR SOLUTION INTRAMUSCULAR; INTRAVENOUS
Qty: 14 | Refills: 0 | Status: DISCONTINUED | COMMUNITY
Start: 2022-05-30 | End: 2022-06-03

## 2022-06-03 RX ORDER — AMLODIPINE BESYLATE 5 MG/1
5 TABLET ORAL
Qty: 30 | Refills: 0 | Status: ACTIVE | COMMUNITY
Start: 2022-05-31

## 2022-06-03 RX ORDER — TAMSULOSIN HYDROCHLORIDE 0.4 MG/1
0.4 CAPSULE ORAL
Qty: 30 | Refills: 0 | Status: ACTIVE | COMMUNITY
Start: 2022-05-25

## 2022-06-03 RX ORDER — ATENOLOL 25 MG/1
25 TABLET ORAL
Qty: 60 | Refills: 0 | Status: DISCONTINUED | COMMUNITY
Start: 2022-05-25 | End: 2022-06-03

## 2022-06-03 RX ORDER — ESCITALOPRAM OXALATE 10 MG/1
10 TABLET ORAL
Qty: 30 | Refills: 0 | Status: ACTIVE | COMMUNITY
Start: 2022-05-25

## 2022-06-03 RX ORDER — ATENOLOL 50 MG/1
50 TABLET ORAL
Refills: 0 | Status: DISCONTINUED | COMMUNITY
End: 2022-06-03

## 2022-06-03 RX ORDER — FAMOTIDINE 20 MG/1
20 TABLET, FILM COATED ORAL
Qty: 60 | Refills: 0 | Status: DISCONTINUED | COMMUNITY
Start: 2022-05-25 | End: 2022-06-03

## 2022-06-03 RX ORDER — SERTRALINE HYDROCHLORIDE 25 MG/1
TABLET, FILM COATED ORAL
Refills: 0 | Status: DISCONTINUED | COMMUNITY
End: 2022-06-03

## 2022-06-03 RX ORDER — MONTELUKAST 10 MG/1
10 TABLET, FILM COATED ORAL
Qty: 30 | Refills: 0 | Status: ACTIVE | COMMUNITY
Start: 2022-04-26

## 2022-06-03 RX ORDER — BENZONATATE 200 MG/1
200 CAPSULE ORAL 3 TIMES DAILY
Qty: 90 | Refills: 0 | Status: DISCONTINUED | COMMUNITY
Start: 2021-01-06 | End: 2022-06-03

## 2022-06-03 RX ORDER — QUETIAPINE 400 MG/1
TABLET, FILM COATED ORAL
Refills: 0 | Status: DISCONTINUED | COMMUNITY
End: 2022-06-03

## 2022-06-03 RX ORDER — FLUTICASONE FUROATE, UMECLIDINIUM BROMIDE AND VILANTEROL TRIFENATATE 200; 62.5; 25 UG/1; UG/1; UG/1
200-62.5-25 POWDER RESPIRATORY (INHALATION)
Refills: 0 | Status: ACTIVE | COMMUNITY

## 2022-06-28 ENCOUNTER — NON-APPOINTMENT (OUTPATIENT)
Age: 77
End: 2022-06-28

## 2022-07-14 ENCOUNTER — APPOINTMENT (OUTPATIENT)
Dept: CT IMAGING | Facility: CLINIC | Age: 77
End: 2022-07-14

## 2022-07-14 ENCOUNTER — OUTPATIENT (OUTPATIENT)
Dept: OUTPATIENT SERVICES | Facility: HOSPITAL | Age: 77
LOS: 1 days | End: 2022-07-14

## 2022-07-14 DIAGNOSIS — J84.10 PULMONARY FIBROSIS, UNSPECIFIED: ICD-10-CM

## 2022-07-14 PROCEDURE — 71250 CT THORAX DX C-: CPT | Mod: 26,ME

## 2022-07-14 PROCEDURE — G1004: CPT

## 2022-09-20 ENCOUNTER — APPOINTMENT (OUTPATIENT)
Dept: PULMONOLOGY | Facility: CLINIC | Age: 77
End: 2022-09-20

## 2022-09-20 VITALS
HEIGHT: 63 IN | OXYGEN SATURATION: 98 % | DIASTOLIC BLOOD PRESSURE: 72 MMHG | HEART RATE: 65 BPM | BODY MASS INDEX: 18.96 KG/M2 | RESPIRATION RATE: 16 BRPM | SYSTOLIC BLOOD PRESSURE: 118 MMHG | WEIGHT: 107 LBS

## 2022-09-20 DIAGNOSIS — J96.11 CHRONIC RESPIRATORY FAILURE WITH HYPOXIA: ICD-10-CM

## 2022-09-20 DIAGNOSIS — J84.10 PULMONARY FIBROSIS, UNSPECIFIED: ICD-10-CM

## 2022-09-20 DIAGNOSIS — R91.1 SOLITARY PULMONARY NODULE: ICD-10-CM

## 2022-09-20 DIAGNOSIS — Z87.891 PERSONAL HISTORY OF NICOTINE DEPENDENCE: ICD-10-CM

## 2022-09-20 DIAGNOSIS — R09.89 OTHER SPECIFIED SYMPTOMS AND SIGNS INVOLVING THE CIRCULATORY AND RESPIRATORY SYSTEMS: ICD-10-CM

## 2022-09-20 DIAGNOSIS — Z79.52 LONG TERM (CURRENT) USE OF SYSTEMIC STEROIDS: ICD-10-CM

## 2022-09-20 DIAGNOSIS — J98.4 OTHER DISORDERS OF LUNG: ICD-10-CM

## 2022-09-20 PROCEDURE — 99214 OFFICE O/P EST MOD 30 MIN: CPT

## 2022-09-20 RX ORDER — SULFAMETHOXAZOLE AND TRIMETHOPRIM 800; 160 MG/1; MG/1
800-160 TABLET ORAL DAILY
Qty: 12 | Refills: 5 | Status: DISCONTINUED | COMMUNITY
Start: 2022-04-15 | End: 2022-09-20

## 2022-09-20 RX ORDER — FAMOTIDINE 20 MG/1
20 TABLET, FILM COATED ORAL
Refills: 0 | Status: ACTIVE | COMMUNITY

## 2022-09-20 RX ORDER — NINTEDANIB 150 MG/1
150 CAPSULE ORAL
Qty: 180 | Refills: 3 | Status: DISCONTINUED | COMMUNITY
Start: 2021-03-19 | End: 2022-09-20

## 2022-09-20 RX ORDER — PANTOPRAZOLE SODIUM 20 MG/1
TABLET, DELAYED RELEASE ORAL
Refills: 0 | Status: DISCONTINUED | COMMUNITY
End: 2022-09-20

## 2022-09-20 RX ORDER — RISPERIDONE 0.5 MG/1
TABLET ORAL
Refills: 0 | Status: DISCONTINUED | COMMUNITY
End: 2022-09-20

## 2022-09-20 RX ORDER — SULFAMETHOXAZOLE AND TRIMETHOPRIM 400; 80 MG/1; MG/1
400-80 TABLET ORAL
Qty: 13 | Refills: 0 | Status: DISCONTINUED | COMMUNITY
Start: 2022-05-18 | End: 2022-09-20

## 2022-09-20 RX ORDER — MULTIVITAMIN
TABLET ORAL
Refills: 0 | Status: ACTIVE | COMMUNITY

## 2022-09-20 RX ORDER — ALBUTEROL SULFATE 90 UG/1
AEROSOL, METERED RESPIRATORY (INHALATION)
Refills: 0 | Status: ACTIVE | COMMUNITY

## 2022-09-20 RX ORDER — L. ACIDOPHILUS/L.BULGARICUS 1MM CELL
TABLET ORAL
Refills: 0 | Status: ACTIVE | COMMUNITY

## 2022-09-20 RX ORDER — CALCIUM CITRATE/VITAMIN D3 315MG-6.25
TABLET ORAL
Refills: 0 | Status: ACTIVE | COMMUNITY

## 2022-09-20 RX ORDER — POLYETHYLENE GLYCOL 400
LIQUID (ML) MISCELLANEOUS
Refills: 0 | Status: ACTIVE | COMMUNITY

## 2022-12-19 ENCOUNTER — INPATIENT (INPATIENT)
Facility: HOSPITAL | Age: 77
LOS: 3 days | Discharge: NURSING, REHAB OR LONG-TERM CARE | DRG: 177 | End: 2022-12-23
Attending: HOSPITALIST | Admitting: HOSPITALIST
Payer: MEDICARE

## 2022-12-19 VITALS
WEIGHT: 164.91 LBS | SYSTOLIC BLOOD PRESSURE: 114 MMHG | HEIGHT: 67 IN | RESPIRATION RATE: 26 BRPM | TEMPERATURE: 99 F | HEART RATE: 86 BPM | DIASTOLIC BLOOD PRESSURE: 77 MMHG | OXYGEN SATURATION: 100 %

## 2022-12-19 DIAGNOSIS — U07.1 COVID-19: ICD-10-CM

## 2022-12-19 DIAGNOSIS — K46.9 UNSPECIFIED ABDOMINAL HERNIA WITHOUT OBSTRUCTION OR GANGRENE: Chronic | ICD-10-CM

## 2022-12-19 LAB
ALBUMIN SERPL ELPH-MCNC: 3.3 G/DL — SIGNIFICANT CHANGE UP (ref 3.3–5.2)
ALP SERPL-CCNC: 61 U/L — SIGNIFICANT CHANGE UP (ref 40–120)
ALT FLD-CCNC: 19 U/L — SIGNIFICANT CHANGE UP
ANION GAP SERPL CALC-SCNC: 9 MMOL/L — SIGNIFICANT CHANGE UP (ref 5–17)
APTT BLD: 26.7 SEC — LOW (ref 27.5–35.5)
AST SERPL-CCNC: 18 U/L — SIGNIFICANT CHANGE UP
B PERT DNA SPEC QL NAA+PROBE: SIGNIFICANT CHANGE UP
BASE EXCESS BLDV CALC-SCNC: 9.1 MMOL/L — HIGH (ref -2–3)
BASOPHILS # BLD AUTO: 0.05 K/UL — SIGNIFICANT CHANGE UP (ref 0–0.2)
BASOPHILS NFR BLD AUTO: 0.4 % — SIGNIFICANT CHANGE UP (ref 0–2)
BILIRUB SERPL-MCNC: 0.4 MG/DL — SIGNIFICANT CHANGE UP (ref 0.4–2)
BUN SERPL-MCNC: 17.1 MG/DL — SIGNIFICANT CHANGE UP (ref 8–20)
C PNEUM DNA SPEC QL NAA+PROBE: SIGNIFICANT CHANGE UP
CA-I SERPL-SCNC: 1.07 MMOL/L — LOW (ref 1.15–1.33)
CALCIUM SERPL-MCNC: 8.9 MG/DL — SIGNIFICANT CHANGE UP (ref 8.4–10.5)
CHLORIDE BLDV-SCNC: 100 MMOL/L — SIGNIFICANT CHANGE UP (ref 96–108)
CHLORIDE SERPL-SCNC: 100 MMOL/L — SIGNIFICANT CHANGE UP (ref 96–108)
CK SERPL-CCNC: 19 U/L — LOW (ref 30–200)
CO2 SERPL-SCNC: 28 MMOL/L — SIGNIFICANT CHANGE UP (ref 22–29)
CREAT SERPL-MCNC: 0.6 MG/DL — SIGNIFICANT CHANGE UP (ref 0.5–1.3)
CRP SERPL-MCNC: 7 MG/L — HIGH
D DIMER BLD IA.RAPID-MCNC: <150 NG/ML DDU — SIGNIFICANT CHANGE UP
EGFR: 101 ML/MIN/1.73M2 — SIGNIFICANT CHANGE UP
EOSINOPHIL # BLD AUTO: 0.37 K/UL — SIGNIFICANT CHANGE UP (ref 0–0.5)
EOSINOPHIL NFR BLD AUTO: 2.9 % — SIGNIFICANT CHANGE UP (ref 0–6)
FERRITIN SERPL-MCNC: 228 NG/ML — SIGNIFICANT CHANGE UP (ref 30–400)
FLUAV H1 2009 PAND RNA SPEC QL NAA+PROBE: SIGNIFICANT CHANGE UP
FLUAV H1 RNA SPEC QL NAA+PROBE: SIGNIFICANT CHANGE UP
FLUAV H3 RNA SPEC QL NAA+PROBE: SIGNIFICANT CHANGE UP
FLUAV SUBTYP SPEC NAA+PROBE: SIGNIFICANT CHANGE UP
FLUBV RNA SPEC QL NAA+PROBE: SIGNIFICANT CHANGE UP
GAS PNL BLDA: SIGNIFICANT CHANGE UP
GAS PNL BLDV: 134 MMOL/L — LOW (ref 136–145)
GAS PNL BLDV: SIGNIFICANT CHANGE UP
GLUCOSE BLDV-MCNC: 85 MG/DL — SIGNIFICANT CHANGE UP (ref 70–99)
GLUCOSE SERPL-MCNC: 79 MG/DL — SIGNIFICANT CHANGE UP (ref 70–99)
HADV DNA SPEC QL NAA+PROBE: SIGNIFICANT CHANGE UP
HCO3 BLDV-SCNC: 32 MMOL/L — HIGH (ref 22–29)
HCOV PNL SPEC NAA+PROBE: SIGNIFICANT CHANGE UP
HCT VFR BLD CALC: 42.3 % — SIGNIFICANT CHANGE UP (ref 39–50)
HCT VFR BLDA CALC: 44 % — SIGNIFICANT CHANGE UP
HGB BLD CALC-MCNC: 14.5 G/DL — SIGNIFICANT CHANGE UP (ref 12.6–17.4)
HGB BLD-MCNC: 14 G/DL — SIGNIFICANT CHANGE UP (ref 13–17)
HMPV RNA SPEC QL NAA+PROBE: SIGNIFICANT CHANGE UP
HPIV1 RNA SPEC QL NAA+PROBE: SIGNIFICANT CHANGE UP
HPIV2 RNA SPEC QL NAA+PROBE: SIGNIFICANT CHANGE UP
HPIV3 RNA SPEC QL NAA+PROBE: SIGNIFICANT CHANGE UP
HPIV4 RNA SPEC QL NAA+PROBE: SIGNIFICANT CHANGE UP
IMM GRANULOCYTES NFR BLD AUTO: 0.9 % — SIGNIFICANT CHANGE UP (ref 0–0.9)
INR BLD: 1.02 RATIO — SIGNIFICANT CHANGE UP (ref 0.88–1.16)
LACTATE BLDV-MCNC: 1.3 MMOL/L — SIGNIFICANT CHANGE UP (ref 0.5–2)
LYMPHOCYTES # BLD AUTO: 15.8 % — SIGNIFICANT CHANGE UP (ref 13–44)
LYMPHOCYTES # BLD AUTO: 2.01 K/UL — SIGNIFICANT CHANGE UP (ref 1–3.3)
MCHC RBC-ENTMCNC: 30.1 PG — SIGNIFICANT CHANGE UP (ref 27–34)
MCHC RBC-ENTMCNC: 33.1 GM/DL — SIGNIFICANT CHANGE UP (ref 32–36)
MCV RBC AUTO: 91 FL — SIGNIFICANT CHANGE UP (ref 80–100)
MONOCYTES # BLD AUTO: 1.11 K/UL — HIGH (ref 0–0.9)
MONOCYTES NFR BLD AUTO: 8.7 % — SIGNIFICANT CHANGE UP (ref 2–14)
NEUTROPHILS # BLD AUTO: 9.09 K/UL — HIGH (ref 1.8–7.4)
NEUTROPHILS NFR BLD AUTO: 71.3 % — SIGNIFICANT CHANGE UP (ref 43–77)
PCO2 BLDV: 38 MMHG — LOW (ref 42–55)
PH BLDV: 7.53 — HIGH (ref 7.32–7.43)
PLATELET # BLD AUTO: 179 K/UL — SIGNIFICANT CHANGE UP (ref 150–400)
PO2 BLDV: 119 MMHG — HIGH (ref 25–45)
POTASSIUM BLDV-SCNC: 3.9 MMOL/L — SIGNIFICANT CHANGE UP (ref 3.5–5.1)
POTASSIUM SERPL-MCNC: 3.9 MMOL/L — SIGNIFICANT CHANGE UP (ref 3.5–5.3)
POTASSIUM SERPL-SCNC: 3.9 MMOL/L — SIGNIFICANT CHANGE UP (ref 3.5–5.3)
PROCALCITONIN SERPL-MCNC: 0.05 NG/ML — SIGNIFICANT CHANGE UP (ref 0.02–0.1)
PROT SERPL-MCNC: 6.4 G/DL — LOW (ref 6.6–8.7)
PROTHROM AB SERPL-ACNC: 11.8 SEC — SIGNIFICANT CHANGE UP (ref 10.5–13.4)
RAPID RVP RESULT: DETECTED
RBC # BLD: 4.65 M/UL — SIGNIFICANT CHANGE UP (ref 4.2–5.8)
RBC # FLD: 12.3 % — SIGNIFICANT CHANGE UP (ref 10.3–14.5)
RV+EV RNA SPEC QL NAA+PROBE: SIGNIFICANT CHANGE UP
SAO2 % BLDV: 99.8 % — SIGNIFICANT CHANGE UP
SARS-COV-2 RNA SPEC QL NAA+PROBE: DETECTED
SODIUM SERPL-SCNC: 137 MMOL/L — SIGNIFICANT CHANGE UP (ref 135–145)
WBC # BLD: 12.74 K/UL — HIGH (ref 3.8–10.5)
WBC # FLD AUTO: 12.74 K/UL — HIGH (ref 3.8–10.5)

## 2022-12-19 PROCEDURE — 71045 X-RAY EXAM CHEST 1 VIEW: CPT | Mod: 26

## 2022-12-19 PROCEDURE — 99223 1ST HOSP IP/OBS HIGH 75: CPT

## 2022-12-19 PROCEDURE — 93010 ELECTROCARDIOGRAM REPORT: CPT

## 2022-12-19 PROCEDURE — 71275 CT ANGIOGRAPHY CHEST: CPT | Mod: 26

## 2022-12-19 PROCEDURE — 99291 CRITICAL CARE FIRST HOUR: CPT | Mod: CS

## 2022-12-19 RX ORDER — LANOLIN ALCOHOL/MO/W.PET/CERES
3 CREAM (GRAM) TOPICAL AT BEDTIME
Refills: 0 | Status: DISCONTINUED | OUTPATIENT
Start: 2022-12-19 | End: 2022-12-23

## 2022-12-19 RX ORDER — REMDESIVIR 5 MG/ML
INJECTION INTRAVENOUS
Refills: 0 | Status: DISCONTINUED | OUTPATIENT
Start: 2022-12-19 | End: 2022-12-23

## 2022-12-19 RX ORDER — ENOXAPARIN SODIUM 100 MG/ML
40 INJECTION SUBCUTANEOUS EVERY 12 HOURS
Refills: 0 | Status: DISCONTINUED | OUTPATIENT
Start: 2022-12-19 | End: 2022-12-23

## 2022-12-19 RX ORDER — ONDANSETRON 8 MG/1
4 TABLET, FILM COATED ORAL EVERY 8 HOURS
Refills: 0 | Status: DISCONTINUED | OUTPATIENT
Start: 2022-12-19 | End: 2022-12-23

## 2022-12-19 RX ORDER — ESCITALOPRAM OXALATE 10 MG/1
10 TABLET, FILM COATED ORAL DAILY
Refills: 0 | Status: DISCONTINUED | OUTPATIENT
Start: 2022-12-19 | End: 2022-12-23

## 2022-12-19 RX ORDER — LACTOBACILLUS ACIDOPHILUS 100MM CELL
1 CAPSULE ORAL EVERY 12 HOURS
Refills: 0 | Status: DISCONTINUED | OUTPATIENT
Start: 2022-12-19 | End: 2022-12-23

## 2022-12-19 RX ORDER — AMLODIPINE BESYLATE 2.5 MG/1
5 TABLET ORAL DAILY
Refills: 0 | Status: DISCONTINUED | OUTPATIENT
Start: 2022-12-19 | End: 2022-12-22

## 2022-12-19 RX ORDER — ALBUTEROL 90 UG/1
2 AEROSOL, METERED ORAL EVERY 6 HOURS
Refills: 0 | Status: DISCONTINUED | OUTPATIENT
Start: 2022-12-19 | End: 2022-12-23

## 2022-12-19 RX ORDER — FAMOTIDINE 10 MG/ML
1 INJECTION INTRAVENOUS
Qty: 0 | Refills: 0 | DISCHARGE

## 2022-12-19 RX ORDER — REMDESIVIR 5 MG/ML
100 INJECTION INTRAVENOUS EVERY 24 HOURS
Refills: 0 | Status: DISCONTINUED | OUTPATIENT
Start: 2022-12-20 | End: 2022-12-23

## 2022-12-19 RX ORDER — LEVETIRACETAM 250 MG/1
500 TABLET, FILM COATED ORAL
Refills: 0 | Status: DISCONTINUED | OUTPATIENT
Start: 2022-12-19 | End: 2022-12-23

## 2022-12-19 RX ORDER — TAMSULOSIN HYDROCHLORIDE 0.4 MG/1
0.4 CAPSULE ORAL AT BEDTIME
Refills: 0 | Status: DISCONTINUED | OUTPATIENT
Start: 2022-12-19 | End: 2022-12-23

## 2022-12-19 RX ORDER — POLYETHYLENE GLYCOL 3350 17 G/17G
17 POWDER, FOR SOLUTION ORAL
Refills: 0 | Status: DISCONTINUED | OUTPATIENT
Start: 2022-12-19 | End: 2022-12-23

## 2022-12-19 RX ORDER — MONTELUKAST 4 MG/1
10 TABLET, CHEWABLE ORAL DAILY
Refills: 0 | Status: DISCONTINUED | OUTPATIENT
Start: 2022-12-19 | End: 2022-12-23

## 2022-12-19 RX ORDER — MONTELUKAST 4 MG/1
1 TABLET, CHEWABLE ORAL
Qty: 0 | Refills: 0 | DISCHARGE

## 2022-12-19 RX ORDER — LACTOBACILLUS ACIDOPHILUS 100MM CELL
1 CAPSULE ORAL
Qty: 0 | Refills: 0 | DISCHARGE

## 2022-12-19 RX ORDER — LEVETIRACETAM 250 MG/1
1 TABLET, FILM COATED ORAL
Qty: 0 | Refills: 0 | DISCHARGE

## 2022-12-19 RX ORDER — HYDROCORTISONE 20 MG
60 TABLET ORAL EVERY 8 HOURS
Refills: 0 | Status: DISCONTINUED | OUTPATIENT
Start: 2022-12-19 | End: 2022-12-19

## 2022-12-19 RX ORDER — DEXAMETHASONE 0.5 MG/5ML
8 ELIXIR ORAL ONCE
Refills: 0 | Status: COMPLETED | OUTPATIENT
Start: 2022-12-19 | End: 2022-12-19

## 2022-12-19 RX ORDER — ALBUTEROL 90 UG/1
2 AEROSOL, METERED ORAL
Qty: 0 | Refills: 0 | DISCHARGE

## 2022-12-19 RX ORDER — ACETAMINOPHEN 500 MG
650 TABLET ORAL EVERY 6 HOURS
Refills: 0 | Status: DISCONTINUED | OUTPATIENT
Start: 2022-12-19 | End: 2022-12-23

## 2022-12-19 RX ORDER — ESCITALOPRAM OXALATE 10 MG/1
1 TABLET, FILM COATED ORAL
Qty: 0 | Refills: 0 | DISCHARGE

## 2022-12-19 RX ORDER — IPRATROPIUM/ALBUTEROL SULFATE 18-103MCG
3 AEROSOL WITH ADAPTER (GRAM) INHALATION
Refills: 0 | Status: DISCONTINUED | OUTPATIENT
Start: 2022-12-19 | End: 2022-12-19

## 2022-12-19 RX ORDER — REMDESIVIR 5 MG/ML
200 INJECTION INTRAVENOUS EVERY 24 HOURS
Refills: 0 | Status: COMPLETED | OUTPATIENT
Start: 2022-12-19 | End: 2022-12-19

## 2022-12-19 RX ORDER — FAMOTIDINE 10 MG/ML
20 INJECTION INTRAVENOUS DAILY
Refills: 0 | Status: DISCONTINUED | OUTPATIENT
Start: 2022-12-19 | End: 2022-12-23

## 2022-12-19 RX ORDER — TAMSULOSIN HYDROCHLORIDE 0.4 MG/1
1 CAPSULE ORAL
Qty: 0 | Refills: 0 | DISCHARGE

## 2022-12-19 RX ORDER — FLUTICASONE FUROATE, UMECLIDINIUM BROMIDE AND VILANTEROL TRIFENATATE 200; 62.5; 25 UG/1; UG/1; UG/1
1 POWDER RESPIRATORY (INHALATION)
Qty: 0 | Refills: 0 | DISCHARGE

## 2022-12-19 RX ADMIN — Medication 1 TABLET(S): at 17:44

## 2022-12-19 RX ADMIN — AMLODIPINE BESYLATE 5 MILLIGRAM(S): 2.5 TABLET ORAL at 17:43

## 2022-12-19 RX ADMIN — ESCITALOPRAM OXALATE 10 MILLIGRAM(S): 10 TABLET, FILM COATED ORAL at 17:44

## 2022-12-19 RX ADMIN — ENOXAPARIN SODIUM 40 MILLIGRAM(S): 100 INJECTION SUBCUTANEOUS at 17:46

## 2022-12-19 RX ADMIN — LEVETIRACETAM 500 MILLIGRAM(S): 250 TABLET, FILM COATED ORAL at 17:43

## 2022-12-19 RX ADMIN — POLYETHYLENE GLYCOL 3350 17 GRAM(S): 17 POWDER, FOR SOLUTION ORAL at 17:43

## 2022-12-19 RX ADMIN — MONTELUKAST 10 MILLIGRAM(S): 4 TABLET, CHEWABLE ORAL at 17:44

## 2022-12-19 RX ADMIN — TAMSULOSIN HYDROCHLORIDE 0.4 MILLIGRAM(S): 0.4 CAPSULE ORAL at 23:17

## 2022-12-19 RX ADMIN — Medication 3 MILLILITER(S): at 12:09

## 2022-12-19 RX ADMIN — Medication 101.6 MILLIGRAM(S): at 12:08

## 2022-12-19 RX ADMIN — Medication 60 MILLIGRAM(S): at 23:20

## 2022-12-19 RX ADMIN — REMDESIVIR 200 MILLIGRAM(S): 5 INJECTION INTRAVENOUS at 17:42

## 2022-12-19 RX ADMIN — Medication 1 TABLET(S): at 17:49

## 2022-12-19 RX ADMIN — Medication 3 MILLILITER(S): at 14:55

## 2022-12-19 NOTE — ED ADULT NURSE NOTE - CHIEF COMPLAINT QUOTE
pt a+ox3, BIBA from [facility] for worsening SOB. reports diff breathing. covid +, on 25L NRB

## 2022-12-19 NOTE — ED PROVIDER NOTE - NS ED ROS FT
Const: Denies fever, chills  HEENT: Denies blurry vision, sore throat  Neck: Denies neck pain/stiffness  Resp: + SOB. Denies coughing  Cardiovascular: Denies CP, palpitations, LE edema  GI: Denies nausea, vomiting, abdominal pain, diarrhea, constipation, blood in stool  : Denies urinary frequency/urgency/dysuria, hematuria  MSK: Denies back pain  Neuro: Denies HA, dizziness, numbness, weakness  Skin: Denies rashes.

## 2022-12-19 NOTE — ED ADULT TRIAGE NOTE - CHIEF COMPLAINT QUOTE
pt a+ox3, BIBA from [facility] for worsening SOB. reports diff breathing. covid +, on 25L NRB

## 2022-12-19 NOTE — H&P ADULT - NSHPPHYSICALEXAM_GEN_ALL_CORE
Vital Signs Last 24 Hrs  T(F): 98.2 (19 Dec 2022 15:04), Max: 98.7 (19 Dec 2022 11:08)  HR: 95 (19 Dec 2022 15:04) (86 - 102)  BP: 108/59 (19 Dec 2022 15:04) (108/59 - 121/78)  RR: 26 (19 Dec 2022 15:04) (26 - 60)  SpO2: 100% (19 Dec 2022 15:04) (87% - 100%)    Physical Exam:  Constitutional: NAD, awake and alert, well-developed +cachetic  Neck: Soft and supple, No LAD, No JVD  Respiratory: decreased breath sounds b/l bases   Cardiovascular: +s1/s2 no edema b/l le  Gastrointestinal: soft nt nd bs+  Vascular: 2+ peripheral pulses  Neurological: no focal deficits  Musculoskeletal: 3/5 strength b/l upper and lower extremities  : Contraindicated  Breasts: Contraindicated  Rectal: Contraindicated

## 2022-12-19 NOTE — PATIENT PROFILE ADULT - NSPROPOAURINARYCATHETER_GEN_A_NUR
Detail Level: Zone Samples Given: Aquaphor ointment AM and Eucerin advanced repair AM Plan: Calluses vs tinea on distal portion of 2nd digit with nail involvement.  [place] above measures and reeval in 2 months.  Consider cause could be mechanical trauma from end of her shoes no Plan: Symptoms have been present most of her adult life.  Mother with very thin hair.  She has noted increased shedding lately.  No known recent illness.  She has hx of hypothyroid.  Obtain copies of recent labs from PCP.  She will initiate ketoconazole shampoo and fluocinonide solution three times weekly.  Reeval in 2 months Samples Given: Jublia apply to 2nd nail bilaterally three times weekly

## 2022-12-19 NOTE — ED PROVIDER NOTE - PHYSICAL EXAMINATION
Const: Awake, alert and oriented. In moderate respiratory distress. Appears chronically unwell.  HEENT: NC/AT. Dry mucous membranes.  Eyes: No scleral icterus. EOMI.  Neck:. Soft and supple. Full ROM without pain.  Cardiac: Tachycardic rate and regular rhythm. Peripheral pulses 2+ and symmetric. No LE edema.  Resp: Moderate respiratory distress, satting 86% on 6L NC.  Abd: Soft, non-tender, non-distended. Normal bowel sounds in all 4 quadrants. No guarding or rebound.  Back: Spine midline and non-tender. No CVAT.  Skin: No rashes, abrasions or lacerations.  Neuro: Awake, alert & oriented x 3. Moves all extremities symmetrically.

## 2022-12-19 NOTE — ED ADULT NURSE NOTE - NS ED NURSE LEVEL OF CONSCIOUSNESS AFFECT
All tests were negative  Treat with salt water gargles, ibuprofen/tylenol as needed  Avoid close contact with others until symptoms resolve  If not improving/worsening, return for care  
Calm/Appropriate

## 2022-12-19 NOTE — H&P ADULT - HISTORY OF PRESENT ILLNESS
76 year old male with pmh of htn, gerd, depression, ILD progressive coming to hospital with complaints of acute shortness of breath. was found to have covid few days prior however sent to hospital as hypoxic and tachypneic. per patient states was sent in as could not breath. denies any pains at this time.

## 2022-12-19 NOTE — ED ADULT NURSE NOTE - NSIMPLEMENTINTERV_GEN_ALL_ED
Implemented All Fall Risk Interventions:  [place] to call system. Call bell, personal items and telephone within reach. Instruct patient to call for assistance. Room bathroom lighting operational. Non-slip footwear when patient is off stretcher. Physically safe environment: no spills, clutter or unnecessary equipment. Stretcher in lowest position, wheels locked, appropriate side rails in place. Provide visual cue, wrist band, yellow gown, etc. Monitor gait and stability. Monitor for mental status changes and reorient to person, place, and time. Review medications for side effects contributing to fall risk. Reinforce activity limits and safety measures with patient and family.

## 2022-12-19 NOTE — ED PROVIDER NOTE - CARE PLAN
1 Principal Discharge DX:	2019 novel coronavirus disease (COVID-19)  Secondary Diagnosis:	Respiratory failure

## 2022-12-19 NOTE — PATIENT PROFILE ADULT - HAS THE PATIENT USED TOBACCO IN THE PAST 30 DAYS?
----- Message from Mohini Reynolds sent at 1/30/2017  4:13 PM EST -----  Contact: PHARMACY  HAVE ONLY RECEIVED A SCRIPT FOR ZOFRAN. PATIENT IS THERE AND SAYS THEY SHOULD BE GETTING A SCRIPT FOR acetaminophen (TYLENOL) 160 MG/5ML solution AND albuterol (PROVENTIL) (2.5 MG/3ML) 0.083% nebulizer solution.    [facility] [address] [place] [address] ADRIANO ESTRADA - [phone]  - [phone] FX       No

## 2022-12-19 NOTE — H&P ADULT - ASSESSMENT
76 year old male with pmh of htn, gerd, depression, ILD progressive coming to hospital with complaints of acute shortness of breath. was found to have covid few days prior however sent to hospital as hypoxic and tachypneic. per patient states was sent in as could not breath. denies any pains at this time.     #Acute Hypoxic Respiratory Failure  - 2/2 covid 19 and progressive ILD   - start remdesivir  - stop prednisone (chronic 20mg)-> change to solumedrol 60mg q8h  - supportive care   - isolation precautions  - bactrium (takes on mwf given progressive ild)  - on high flow -> will wean as tolerated given some degree of hypoxia will be present with chronic ild   - albuterol, montelukast  - ct angio to r/o pe  - pulm consult in am   - palliative consult in am    #seizure hx  - seizure precautions  - keppra    #HTN-Essential   - monitor blood pressure  - amlodipine    #Depression   - escitalopram     #GERD  - pepcid    #BPH  - tamsulosin    #severe protein calorie malnutrition   - ensure tid     #DVT Prophylaxis  - lovenox SC     called and spoke to patient Emergency Contact juan josé [phone] 76 year old male with pmh of htn, gerd, depression, ILD progressive coming to hospital with complaints of acute shortness of breath. was found to have covid few days prior however sent to hospital as hypoxic and tachypneic. per patient states was sent in as could not breath. denies any pains at this time.     #Acute Hypoxic Respiratory Failure  - 2/2 covid 19 and progressive ILD   - start remdesivir  - stop prednisone (chronic 20mg)-> change to solumedrol 60mg q8h  - supportive care   - isolation precautions  - bactrim (takes on mwf given progressive ild)  - on high flow -> will wean as tolerated given some degree of hypoxia will be present with chronic ild   - albuterol, montelukast  - ct angio to r/o pe  - pulm consult in am   - palliative consult in am    #seizure hx  - seizure precautions  - keppra    #HTN-Essential   - monitor blood pressure  - amlodipine    #Depression   - escitalopram     #GERD  - pepcid    #BPH  - tamsulosin    #severe protein calorie malnutrition   - ensure tid     #DVT Prophylaxis  - lovenox SC     called and spoke to patient Emergency Contact juan josé [phone]

## 2022-12-19 NOTE — ED PROVIDER NOTE - CRITICAL CARE ATTENDING CONTRIBUTION TO CARE
Priyanka CRAWLEY DO, have personally provided 45 minutes of critical care time exclusive of time spent on separately billable procedures. Time includes review of laboratory data, radiology results, discussion with consultants, and monitoring for potential decompensation. Interventions were performed as documented above.

## 2022-12-19 NOTE — ED ADULT NURSE NOTE - OBJECTIVE STATEMENT
assumed care of pt at 11:45. from [facility]. pt is poor historian unable to obtain much information. pt reports sob for last couple of days. noted to be sating 86% on 6 L NC. MD haney at bedside. respiratory at bedside to place pt on high flow. intervention successful sating 100% on high flow. denies chest pain. sob, present. dnr/dni at [facility]. rr even and labored. anox2. pt educated on plan of care, pt able to successfully teach back plan of care to RN, RN will continue to reeducate pt during hospital stay.

## 2022-12-19 NOTE — ED ADULT NURSE NOTE - HISTORY OF COVID-19 VACCINATION
----- Message from RENA Wan sent at 7/9/2021  1:37 PM CDT -----  Urine good no abnormal protein noted  A1c 6.0 high of normal glucose but at goal great job cont same recheck in 3 months  Lipids LDL at 51 great job increase exercise to increase HDL otherwise great job no changes  Cmp electrolytes liver and kidneys wnl  Alk phos stable   Hiv screen negative  CBC normal no anemia or concerns
Sent patient a My Chart message with normal lab/test results. Asked that patient contact the office with any questions or concerns.
Vaccine status unknown

## 2022-12-19 NOTE — H&P ADULT - NSHPREVIEWOFSYSTEMS_GEN_ALL_CORE
Review of Systems:  CONSTITUTIONAL: +weakness; no chills   EYES/ENT: No visual changes;  No vertigo or throat pain   NECK: No pain or stiffness  RESPIRATORY: +cough +shortness of breath   CARDIOVASCULAR: No chest pain or palpitations  GASTROINTESTINAL: No abdominal or epigastric pain. No nausea, vomiting, or hematemesis; No diarrhea or constipation.   GENITOURINARY: No dysuria, frequency or hematuria  NEUROLOGICAL: No numbness or weakness  SKIN: No itching, burning, rashes, or lesions

## 2022-12-19 NOTE — ED PROVIDER NOTE - OBJECTIVE STATEMENT
77 y/o M with PMH HTN, GERD, depression presents for SOB and feeling "unwell" for "some time", tested positive for Covid and was noted to be tachypneic and hypoxic. He denies fevers, chest pain, abdominal pain, nausea and vomiting. He denies cough while coughing continuously during my interview. Patient denies allergies to medications.

## 2022-12-19 NOTE — ED PROVIDER NOTE - CLINICAL SUMMARY MEDICAL DECISION MAKING FREE TEXT BOX
75 y/o M presents for respiratory distress, hypoxia, requiring supplemental O2, Covid + by history. High flow NC, will require admission.

## 2022-12-20 LAB
ANION GAP SERPL CALC-SCNC: 10 MMOL/L — SIGNIFICANT CHANGE UP (ref 5–17)
BUN SERPL-MCNC: 15.9 MG/DL — SIGNIFICANT CHANGE UP (ref 8–20)
CALCIUM SERPL-MCNC: 9.1 MG/DL — SIGNIFICANT CHANGE UP (ref 8.4–10.5)
CHLORIDE SERPL-SCNC: 96 MMOL/L — SIGNIFICANT CHANGE UP (ref 96–108)
CO2 SERPL-SCNC: 30 MMOL/L — HIGH (ref 22–29)
CREAT SERPL-MCNC: 0.74 MG/DL — SIGNIFICANT CHANGE UP (ref 0.5–1.3)
EGFR: 94 ML/MIN/1.73M2 — SIGNIFICANT CHANGE UP
GLUCOSE SERPL-MCNC: 145 MG/DL — HIGH (ref 70–99)
HCT VFR BLD CALC: 44.3 % — SIGNIFICANT CHANGE UP (ref 39–50)
HGB BLD-MCNC: 14.3 G/DL — SIGNIFICANT CHANGE UP (ref 13–17)
MAGNESIUM SERPL-MCNC: 1.9 MG/DL — SIGNIFICANT CHANGE UP (ref 1.8–2.6)
MCHC RBC-ENTMCNC: 29.9 PG — SIGNIFICANT CHANGE UP (ref 27–34)
MCHC RBC-ENTMCNC: 32.3 GM/DL — SIGNIFICANT CHANGE UP (ref 32–36)
MCV RBC AUTO: 92.7 FL — SIGNIFICANT CHANGE UP (ref 80–100)
PHOSPHATE SERPL-MCNC: 4.3 MG/DL — SIGNIFICANT CHANGE UP (ref 2.4–4.7)
PLATELET # BLD AUTO: 188 K/UL — SIGNIFICANT CHANGE UP (ref 150–400)
POTASSIUM SERPL-MCNC: 4.7 MMOL/L — SIGNIFICANT CHANGE UP (ref 3.5–5.3)
POTASSIUM SERPL-SCNC: 4.7 MMOL/L — SIGNIFICANT CHANGE UP (ref 3.5–5.3)
RBC # BLD: 4.78 M/UL — SIGNIFICANT CHANGE UP (ref 4.2–5.8)
RBC # FLD: 12.2 % — SIGNIFICANT CHANGE UP (ref 10.3–14.5)
SODIUM SERPL-SCNC: 136 MMOL/L — SIGNIFICANT CHANGE UP (ref 135–145)
WBC # BLD: 10.41 K/UL — SIGNIFICANT CHANGE UP (ref 3.8–10.5)
WBC # FLD AUTO: 10.41 K/UL — SIGNIFICANT CHANGE UP (ref 3.8–10.5)

## 2022-12-20 PROCEDURE — 99233 SBSQ HOSP IP/OBS HIGH 50: CPT

## 2022-12-20 PROCEDURE — 99222 1ST HOSP IP/OBS MODERATE 55: CPT

## 2022-12-20 RX ADMIN — REMDESIVIR 200 MILLIGRAM(S): 5 INJECTION INTRAVENOUS at 18:10

## 2022-12-20 RX ADMIN — MONTELUKAST 10 MILLIGRAM(S): 4 TABLET, CHEWABLE ORAL at 13:29

## 2022-12-20 RX ADMIN — POLYETHYLENE GLYCOL 3350 17 GRAM(S): 17 POWDER, FOR SOLUTION ORAL at 18:10

## 2022-12-20 RX ADMIN — Medication 1 TABLET(S): at 05:15

## 2022-12-20 RX ADMIN — FAMOTIDINE 20 MILLIGRAM(S): 10 INJECTION INTRAVENOUS at 13:29

## 2022-12-20 RX ADMIN — TAMSULOSIN HYDROCHLORIDE 0.4 MILLIGRAM(S): 0.4 CAPSULE ORAL at 21:16

## 2022-12-20 RX ADMIN — ENOXAPARIN SODIUM 40 MILLIGRAM(S): 100 INJECTION SUBCUTANEOUS at 18:10

## 2022-12-20 RX ADMIN — POLYETHYLENE GLYCOL 3350 17 GRAM(S): 17 POWDER, FOR SOLUTION ORAL at 05:14

## 2022-12-20 RX ADMIN — Medication 60 MILLIGRAM(S): at 05:15

## 2022-12-20 RX ADMIN — LEVETIRACETAM 500 MILLIGRAM(S): 250 TABLET, FILM COATED ORAL at 05:16

## 2022-12-20 RX ADMIN — AMLODIPINE BESYLATE 5 MILLIGRAM(S): 2.5 TABLET ORAL at 05:15

## 2022-12-20 RX ADMIN — LEVETIRACETAM 500 MILLIGRAM(S): 250 TABLET, FILM COATED ORAL at 18:10

## 2022-12-20 RX ADMIN — Medication 1 TABLET(S): at 18:10

## 2022-12-20 RX ADMIN — Medication 1 TABLET(S): at 13:29

## 2022-12-20 RX ADMIN — ENOXAPARIN SODIUM 40 MILLIGRAM(S): 100 INJECTION SUBCUTANEOUS at 05:14

## 2022-12-20 RX ADMIN — ESCITALOPRAM OXALATE 10 MILLIGRAM(S): 10 TABLET, FILM COATED ORAL at 13:29

## 2022-12-20 RX ADMIN — Medication 60 MILLIGRAM(S): at 13:29

## 2022-12-20 RX ADMIN — Medication 40 MILLIGRAM(S): at 21:16

## 2022-12-20 NOTE — CONSULT NOTE ADULT - ASSESSMENT
AHRF in the setting of COVID-19 infection superimposed on severe ILD , likely IPF   CT chest with stable severe ILD and without acute findings, making COVID 19 PNA or superimposed bacterial infection less likely  Would switch back to oral Prednisone in AM  Supportive treatment with O2 and inhalers   Needs GOC discussion given advanced ILD   Outpatient Pulmonary follow up

## 2022-12-21 LAB
ALBUMIN SERPL ELPH-MCNC: 3.3 G/DL — SIGNIFICANT CHANGE UP (ref 3.3–5.2)
ALP SERPL-CCNC: 55 U/L — SIGNIFICANT CHANGE UP (ref 40–120)
ALT FLD-CCNC: 16 U/L — SIGNIFICANT CHANGE UP
ANION GAP SERPL CALC-SCNC: 11 MMOL/L — SIGNIFICANT CHANGE UP (ref 5–17)
AST SERPL-CCNC: 18 U/L — SIGNIFICANT CHANGE UP
BILIRUB DIRECT SERPL-MCNC: 0.1 MG/DL — SIGNIFICANT CHANGE UP (ref 0–0.3)
BILIRUB INDIRECT FLD-MCNC: 0.2 MG/DL — SIGNIFICANT CHANGE UP (ref 0.2–1)
BILIRUB SERPL-MCNC: 0.3 MG/DL — LOW (ref 0.4–2)
BUN SERPL-MCNC: 29.1 MG/DL — HIGH (ref 8–20)
CALCIUM SERPL-MCNC: 9 MG/DL — SIGNIFICANT CHANGE UP (ref 8.4–10.5)
CHLORIDE SERPL-SCNC: 97 MMOL/L — SIGNIFICANT CHANGE UP (ref 96–108)
CO2 SERPL-SCNC: 29 MMOL/L — SIGNIFICANT CHANGE UP (ref 22–29)
CREAT SERPL-MCNC: 0.71 MG/DL — SIGNIFICANT CHANGE UP (ref 0.5–1.3)
EGFR: 96 ML/MIN/1.73M2 — SIGNIFICANT CHANGE UP
GLUCOSE SERPL-MCNC: 154 MG/DL — HIGH (ref 70–99)
MAGNESIUM SERPL-MCNC: 2.1 MG/DL — SIGNIFICANT CHANGE UP (ref 1.6–2.6)
POTASSIUM SERPL-MCNC: 4.6 MMOL/L — SIGNIFICANT CHANGE UP (ref 3.5–5.3)
POTASSIUM SERPL-SCNC: 4.6 MMOL/L — SIGNIFICANT CHANGE UP (ref 3.5–5.3)
PROT SERPL-MCNC: 6.3 G/DL — LOW (ref 6.6–8.7)
SODIUM SERPL-SCNC: 136 MMOL/L — SIGNIFICANT CHANGE UP (ref 135–145)
TSH SERPL-MCNC: 0.75 UIU/ML — SIGNIFICANT CHANGE UP (ref 0.27–4.2)

## 2022-12-21 PROCEDURE — 93306 TTE W/DOPPLER COMPLETE: CPT | Mod: 26

## 2022-12-21 PROCEDURE — 99233 SBSQ HOSP IP/OBS HIGH 50: CPT

## 2022-12-21 PROCEDURE — 99223 1ST HOSP IP/OBS HIGH 75: CPT

## 2022-12-21 RX ADMIN — Medication 1 TABLET(S): at 13:46

## 2022-12-21 RX ADMIN — ESCITALOPRAM OXALATE 10 MILLIGRAM(S): 10 TABLET, FILM COATED ORAL at 13:46

## 2022-12-21 RX ADMIN — AMLODIPINE BESYLATE 5 MILLIGRAM(S): 2.5 TABLET ORAL at 05:25

## 2022-12-21 RX ADMIN — Medication 40 MILLIGRAM(S): at 13:47

## 2022-12-21 RX ADMIN — LEVETIRACETAM 500 MILLIGRAM(S): 250 TABLET, FILM COATED ORAL at 05:25

## 2022-12-21 RX ADMIN — Medication 1 TABLET(S): at 05:25

## 2022-12-21 RX ADMIN — MONTELUKAST 10 MILLIGRAM(S): 4 TABLET, CHEWABLE ORAL at 13:47

## 2022-12-21 RX ADMIN — FAMOTIDINE 20 MILLIGRAM(S): 10 INJECTION INTRAVENOUS at 13:46

## 2022-12-21 RX ADMIN — TAMSULOSIN HYDROCHLORIDE 0.4 MILLIGRAM(S): 0.4 CAPSULE ORAL at 21:57

## 2022-12-21 RX ADMIN — POLYETHYLENE GLYCOL 3350 17 GRAM(S): 17 POWDER, FOR SOLUTION ORAL at 18:11

## 2022-12-21 RX ADMIN — ENOXAPARIN SODIUM 40 MILLIGRAM(S): 100 INJECTION SUBCUTANEOUS at 05:24

## 2022-12-21 RX ADMIN — ENOXAPARIN SODIUM 40 MILLIGRAM(S): 100 INJECTION SUBCUTANEOUS at 18:11

## 2022-12-21 RX ADMIN — Medication 40 MILLIGRAM(S): at 21:58

## 2022-12-21 RX ADMIN — Medication 40 MILLIGRAM(S): at 05:24

## 2022-12-21 RX ADMIN — REMDESIVIR 200 MILLIGRAM(S): 5 INJECTION INTRAVENOUS at 21:59

## 2022-12-21 RX ADMIN — Medication 1 TABLET(S): at 18:11

## 2022-12-21 RX ADMIN — LEVETIRACETAM 500 MILLIGRAM(S): 250 TABLET, FILM COATED ORAL at 18:11

## 2022-12-21 NOTE — CONSULT NOTE ADULT - ASSESSMENT
76M with hx of advance ILD on home O2, COPD, HTN, depression, seizure, last hospitalization in 3/2022 for ILD acute exacerbation sent from [facility] for acute on chronic respiratory failure secondary to recent COVID infection and possible underlying acute ILD exacerbation, waxing mentation and overall guarded prognosis. Palliative consulted for support and ongoing goc.     Acute on Chronic Respiratory Failure  Advancing ILD, Suspicion for Acute Exacerbation  COVID19 Infection  - on remdesivir and corticosteriods  - O2 improved, off HFNC to 5L NC this AM  - empiric bactrim for ILD   - pulmonary consulted, input appreciated    Debility  - assist with ADLs, fall precaution  - PT eval when appropriate    Palliative Care Encounter  - HCP is juan josé Baig, scanned form reviewed in patient window --> completed by writer during last hospitalization in 3/2022  - DNR/DNI MOLST -->  scanned form reviewed in patient window 76M with hx of advance ILD on home O2, COPD, HTN, depression, seizure, last hospitalization in 3/2022 for ILD acute exacerbation sent from [facility] for acute on chronic respiratory failure secondary to recent COVID infection and possible underlying acute ILD exacerbation, waxing mentation and overall guarded prognosis. Palliative consulted for support and ongoing goc.     Acute on Chronic Respiratory Failure  Advanced ILD, Suspicion for Acute Exacerbation  COVID19 Infection  - on remdesivir and corticosteriods  - O2 improved, off HFNC to 5L NC this AM  - empiric bactrim for ILD   - pulmonary consulted, input appreciated  - given advancing nature of ILD and hospitalizations for acute exacerbation, progressive debility --> hospice eligible if in line with GOC     Debility  - assist with ADLs, fall precaution  - PT eval when appropriate    Palliative Care Encounter  - HCP is juan josé Baig, scanned form reviewed in patient window --> completed by writer during last hospitalization in 3/2022  - DNR/DNI MOLST -->  scanned form reviewed in patient window   76M with hx of advance ILD on home O2, COPD, HTN, depression, seizure, last hospitalization in 3/2022 for ILD acute exacerbation sent from [facility] for acute on chronic respiratory failure secondary to recent COVID infection and possible underlying acute ILD exacerbation, waxing mentation and overall guarded prognosis. Palliative consulted for support and ongoing goc.     Acute on Chronic Respiratory Failure  Advanced ILD, Suspicion for Acute Exacerbation  COVID19 Infection  - on remdesivir and corticosteriods  - O2 improved, off HFNC to 5L NC this AM  - empiric bactrim for ILD   - pulmonary consulted, input appreciated  - given advancing nature of ILD and hospitalizations for acute exacerbation, progressive debility --> hospice eligible if in line with GOC     Debility  - assist with ADLs, fall precaution  - PT eval when appropriate    Palliative Care Encounter  - HCP is juan josé Baig, scanned form reviewed in patient window --> completed by writer during last hospitalization in 3/2022  - DNR/DNI MOLST -->  scanned form reviewed in patient window    Pt continues to be medically optimized at this time. Will attempt to reach out to HCP niece for collateral history and goc.

## 2022-12-21 NOTE — CHART NOTE - NSCHARTNOTEFT_GEN_A_CORE
Pt had run of trigeminy approx 15 seconds  asymptomatic, pt  without complaints  VSS B/P 101/64 P 76 R 22 PO 97% T 97.4  NAD  hemodynamically stable  NSR now with no ectopy  BMP  MG  Continue to monitor Pt had run of trigeminy approx 15 seconds  asymptomatic, pt  without complaints  VSS B/P 101/64 P 76 R 22 PO 97% T 97.4  NAD  hemodynamically stable  NSR now with no ectopy  BMP K 4.6  MG  2.1  Continue to monitor

## 2022-12-21 NOTE — CONSULT NOTE ADULT - TIME BILLING
D/W RN, hospitalist Dr Franco    Chart review, meds, labs, imaging, coordination of care with other providers and disciplines re pain/symptom mgnt, advance ILD, COVID19 infection, respiratory failure, debility

## 2022-12-22 LAB
24R-OH-CALCIDIOL SERPL-MCNC: 25.1 NG/ML — LOW (ref 30–80)
NT-PROBNP SERPL-SCNC: 244 PG/ML — SIGNIFICANT CHANGE UP (ref 0–300)
SARS-COV-2 RNA SPEC QL NAA+PROBE: SIGNIFICANT CHANGE UP

## 2022-12-22 PROCEDURE — 99497 ADVNCD CARE PLAN 30 MIN: CPT | Mod: 25

## 2022-12-22 PROCEDURE — 99233 SBSQ HOSP IP/OBS HIGH 50: CPT

## 2022-12-22 PROCEDURE — 99222 1ST HOSP IP/OBS MODERATE 55: CPT | Mod: FS

## 2022-12-22 PROCEDURE — 99232 SBSQ HOSP IP/OBS MODERATE 35: CPT

## 2022-12-22 RX ORDER — METOPROLOL TARTRATE 50 MG
25 TABLET ORAL
Refills: 0 | Status: DISCONTINUED | OUTPATIENT
Start: 2022-12-22 | End: 2022-12-23

## 2022-12-22 RX ORDER — ASPIRIN/CALCIUM CARB/MAGNESIUM 324 MG
81 TABLET ORAL DAILY
Refills: 0 | Status: DISCONTINUED | OUTPATIENT
Start: 2022-12-22 | End: 2022-12-23

## 2022-12-22 RX ADMIN — MONTELUKAST 10 MILLIGRAM(S): 4 TABLET, CHEWABLE ORAL at 12:35

## 2022-12-22 RX ADMIN — POLYETHYLENE GLYCOL 3350 17 GRAM(S): 17 POWDER, FOR SOLUTION ORAL at 17:38

## 2022-12-22 RX ADMIN — Medication 1 TABLET(S): at 06:00

## 2022-12-22 RX ADMIN — FAMOTIDINE 20 MILLIGRAM(S): 10 INJECTION INTRAVENOUS at 12:35

## 2022-12-22 RX ADMIN — Medication 25 MILLIGRAM(S): at 12:34

## 2022-12-22 RX ADMIN — Medication 1 TABLET(S): at 17:37

## 2022-12-22 RX ADMIN — LEVETIRACETAM 500 MILLIGRAM(S): 250 TABLET, FILM COATED ORAL at 17:37

## 2022-12-22 RX ADMIN — Medication 1 TABLET(S): at 12:34

## 2022-12-22 RX ADMIN — LEVETIRACETAM 500 MILLIGRAM(S): 250 TABLET, FILM COATED ORAL at 06:00

## 2022-12-22 RX ADMIN — Medication 81 MILLIGRAM(S): at 12:34

## 2022-12-22 RX ADMIN — Medication 40 MILLIGRAM(S): at 17:37

## 2022-12-22 RX ADMIN — ENOXAPARIN SODIUM 40 MILLIGRAM(S): 100 INJECTION SUBCUTANEOUS at 06:00

## 2022-12-22 RX ADMIN — POLYETHYLENE GLYCOL 3350 17 GRAM(S): 17 POWDER, FOR SOLUTION ORAL at 06:01

## 2022-12-22 RX ADMIN — Medication 40 MILLIGRAM(S): at 06:01

## 2022-12-22 RX ADMIN — ENOXAPARIN SODIUM 40 MILLIGRAM(S): 100 INJECTION SUBCUTANEOUS at 17:36

## 2022-12-22 RX ADMIN — TAMSULOSIN HYDROCHLORIDE 0.4 MILLIGRAM(S): 0.4 CAPSULE ORAL at 21:28

## 2022-12-22 RX ADMIN — ESCITALOPRAM OXALATE 10 MILLIGRAM(S): 10 TABLET, FILM COATED ORAL at 12:35

## 2022-12-22 RX ADMIN — AMLODIPINE BESYLATE 5 MILLIGRAM(S): 2.5 TABLET ORAL at 06:00

## 2022-12-22 RX ADMIN — REMDESIVIR 200 MILLIGRAM(S): 5 INJECTION INTRAVENOUS at 17:36

## 2022-12-22 NOTE — PROGRESS NOTE ADULT - SUBJECTIVE AND OBJECTIVE BOX
Patient is a 76y old  Male who presents with a chief complaint of hypoxia    Pt is seen with  En ID [id number]  he is on high flow oxygen     he is feeling little better , no SOB cachectic       ALLERGIES:  No Known Allergies    MEDICATIONS  (STANDING):  amLODIPine   Tablet 5 milliGRAM(s) Oral daily  enoxaparin Injectable 40 milliGRAM(s) SubCutaneous every 12 hours  escitalopram 10 milliGRAM(s) Oral daily  famotidine    Tablet 20 milliGRAM(s) Oral daily  lactobacillus acidophilus 1 Tablet(s) Oral every 12 hours  levETIRAcetam 500 milliGRAM(s) Oral two times a day  methylPREDNISolone sodium succinate Injectable 60 milliGRAM(s) IV Push every 8 hours  montelukast 10 milliGRAM(s) Oral daily  multivitamin 1 Tablet(s) Oral daily  polyethylene glycol 3350 17 Gram(s) Oral two times a day  remdesivir  IVPB   IV Intermittent   remdesivir  IVPB 100 milliGRAM(s) IV Intermittent every 24 hours  tamsulosin 0.4 milliGRAM(s) Oral at bedtime  trimethoprim   80 mG/sulfamethoxazole 400 mG 1 Tablet(s) Oral <User Schedule>    MEDICATIONS  (PRN):  acetaminophen     Tablet .. 650 milliGRAM(s) Oral every 6 hours PRN Temp greater or equal to 38C (100.4F), Mild Pain (1 - 3)  albuterol    90 MICROgram(s) HFA Inhaler 2 Puff(s) Inhalation every 6 hours PRN Shortness of Breath and/or Wheezing  aluminum hydroxide/magnesium hydroxide/simethicone Suspension 30 milliLiter(s) Oral every 4 hours PRN Dyspepsia  melatonin 3 milliGRAM(s) Oral at bedtime PRN Insomnia  ondansetron Injectable 4 milliGRAM(s) IV Push every 8 hours PRN Nausea and/or Vomiting    Vital Signs Last 24 Hrs  T(F): 98 (20 Dec 2022 11:55), Max: 98 (20 Dec 2022 11:55)  HR: 89 (20 Dec 2022 11:55) (80 - 96)  BP: 105/67 (20 Dec 2022 11:55) (105/67 - 128/78)  RR: 18 (20 Dec 2022 11:55) (18 - 28)  SpO2: 96% (20 Dec 2022 11:55) (92% - 100%)  I&O's Summary      PHYSICAL EXAM:  General: awake alert , no distress , cachexia   Neck: supple, no JVD   Lungs: Decreased diminished BS   Cardio: RRR, S1/S2, No murmur  Abdomen: Soft, Nontender, Nondistended; Bowel sounds present  Extremities: No calf tenderness, No pitting edema  Skin : warm , normal color       LABS:                        14.3   10.41 )-----------( 188      ( 20 Dec 2022 06:07 )             44.3     12-20    136  |  96  |  15.9  ----------------------------<  145  4.7   |  30.0  |  0.74    Ca    9.1      20 Dec 2022 06:07  Phos  4.3     12-20  Mg     1.9     12-20    TPro  6.4  /  Alb  3.3  /  TBili  0.4  /  DBili  x   /  AST  18  /  ALT  19  /  AlkPhos  61  12-19          PT/INR - ( 19 Dec 2022 11:45 )   PT: 11.8 sec;   INR: 1.02 ratio         PTT - ( 19 Dec 2022 11:45 )  PTT:26.7 sec    Procalcitonin, Serum: 0.05 ng/mL (12-19-22 @ 11:45)    CARDIAC MARKERS ( 19 Dec 2022 11:45 )  x     / x     / 19 U/L / x     / x              11:45 - VBG - pH: 7.530 | pCO2: 38    | pO2: 119   | Lactate: 1.30     ABG - ( 19 Dec 2022 11:56 )  pH, Arterial: 7.530 pH, Blood: x     /  pCO2: 38    /  pO2: 62    / HCO3: 32    / Base Excess: 9.1   /  SaO2: 94.3                c< from: CT Angio Chest PE Protocol w/ IV Cont (12.19.22 @ 21:04) >  IMPRESSION:    No pulmonary embolus. Dilated right ventricle and pulmonary artery likely   representing pulmonary hypertension.    Stable severe interstitial lung disease.    --- End of Report ---    < end of copied text >                RADIOLOGY & ADDITIONAL TESTS:      
[facility] PALLIATIVE MEDICINE     CC: FOLLOW UP VISIT + GOC    INTERVAL HPI/OVERNIGHT EVENTS:  Source if other than patient:  []Family   [x]Team     No acute events overnight.   Still with intermittent confusion per staff.     PRESENT SYMPTOMS:     Dyspnea: No on NC 5L  Nausea/Vomiting:  No  Anxiety:   No  Depression: No  Fatigue: Yes   Loss of appetite: No  Constipation:  none documented    Pain: No             Character-            Duration-            Effect-            Factors-            Frequency-            Location-            Severity-    Pain AD Score:  [email or web address] (press ctrl + left click to view)    Review of Systems: see above    MEDICATIONS  (STANDING):  aspirin enteric coated 81 milliGRAM(s) Oral daily  enoxaparin Injectable 40 milliGRAM(s) SubCutaneous every 12 hours  escitalopram 10 milliGRAM(s) Oral daily  famotidine    Tablet 20 milliGRAM(s) Oral daily  lactobacillus acidophilus 1 Tablet(s) Oral every 12 hours  levETIRAcetam 500 milliGRAM(s) Oral two times a day  methylPREDNISolone sodium succinate Injectable 40 milliGRAM(s) IV Push two times a day  metoprolol tartrate 25 milliGRAM(s) Oral two times a day  montelukast 10 milliGRAM(s) Oral daily  multivitamin 1 Tablet(s) Oral daily  polyethylene glycol 3350 17 Gram(s) Oral two times a day  remdesivir  IVPB   IV Intermittent   remdesivir  IVPB 100 milliGRAM(s) IV Intermittent every 24 hours  tamsulosin 0.4 milliGRAM(s) Oral at bedtime  trimethoprim   80 mG/sulfamethoxazole 400 mG 1 Tablet(s) Oral <User Schedule>    MEDICATIONS  (PRN):  acetaminophen     Tablet .. 650 milliGRAM(s) Oral every 6 hours PRN Temp greater or equal to 38C (100.4F), Mild Pain (1 - 3)  albuterol    90 MICROgram(s) HFA Inhaler 2 Puff(s) Inhalation every 6 hours PRN Shortness of Breath and/or Wheezing  aluminum hydroxide/magnesium hydroxide/simethicone Suspension 30 milliLiter(s) Oral every 4 hours PRN Dyspepsia  melatonin 3 milliGRAM(s) Oral at bedtime PRN Insomnia  ondansetron Injectable 4 milliGRAM(s) IV Push every 8 hours PRN Nausea and/or Vomiting    Physical Exam:  Due to the nature of this patient's COVID-19 isolation status, no bedside physical exam was done to limit spread of infection. Examination highlights were provided by bedside nurse and/or primary team. Objective data were reviewed in detail.    Vital Signs Last 24 Hrs  T(C): 36.4 (22 Dec 2022 04:52), Max: 36.7 (21 Dec 2022 17:18)  T(F): 97.5 (22 Dec 2022 04:52), Max: 98 (21 Dec 2022 17:18)  HR: 88 (22 Dec 2022 04:52) (88 - 100)  BP: 111/71 (22 Dec 2022 04:52) (111/71 - 117/78)  BP(mean): --  RR: 18 (22 Dec 2022 04:52) (18 - 18)  SpO2: 94% (21 Dec 2022 21:38) (94% - 95%)    Parameters below as of 22 Dec 2022 04:52  Patient On (Oxygen Delivery Method): nasal cannula  O2 Flow (L/min): 5    LABS:      12-21    136  |  97  |  29.1<H>  ----------------------------<  154<H>  4.6   |  29.0  |  0.71    Ca    9.0      21 Dec 2022 02:40  Mg     2.1     12-21    TPro  6.3<L>  /  Alb  3.3  /  TBili  0.3<L>  /  DBili  0.1  /  AST  18  /  ALT  16  /  AlkPhos  55  12-21    I&O's Summary      RADIOLOGY & ADDITIONAL STUDIES: Reviewed     ADVANCE DIRECTIVES/TREATMENT PREFERENCES:  DNR YES NO  Completed on:                     MOLST  YES NO   Completed on:  Living Will  YES NO   Completed on:    NEUROLOGICAL MEDICATIONS/OPIOIDS/BENZODIAZEPINE IN PAST 24 HOURS    escitalopram   10 milliGRAM(s) Oral (12-22-22 @ 12:35)   10 milliGRAM(s) Oral (12-21-22 @ 13:46)    levETIRAcetam   500 milliGRAM(s) Oral (12-22-22 @ 06:00)   500 milliGRAM(s) Oral (12-21-22 @ 18:11)    
77y old  male who presents with a chief complaint of shortness of breath , found to have COVID-19 infection      INTERVAL HPI/OVERNIGHT EVENTS:    SOB improving , switched from HFNC to nasal canula 3 L with good tolerance     T(C): 36.4, Max: 36.7 (12-21-22 @ 17:18)  HR: 88  BP: 111/71  RR: 18  SpO2: 94%    PHYSICAL EXAM:  GENERAL: comfortable , not in distress  HEENT: Anicteric sclera, EOMI  CHEST/LUNG: bilateral rhonchi   HEART: Regular rate and rhythm; No murmurs, rubs, or gallops  ABDOMEN: Soft, Nontender, Nondistended; Bowel sounds present  EXTREMITIES: No clubbing, cyanosis, or edema      LABS:      12-21    136  |  97  |  29.1<H>  ----------------------------<  154<H>  4.6   |  29.0  |  0.71    Ca    9.0      21 Dec 2022 02:40  Mg     2.1     12-21    TPro  6.3<L>  /  Alb  3.3  /  TBili  0.3<L>  /  DBili  0.1  /  AST  18  /  ALT  16  /  AlkPhos  55  12-21      Culture - Blood (collected 19 Dec 2022 17:38)  Source: .Blood Blood  Preliminary Report (20 Dec 2022 23:02):    No growth to date.    Culture - Blood (collected 19 Dec 2022 17:32)  Source: .Blood Blood  Preliminary Report (20 Dec 2022 23:02):    No growth to date.          RADIOLOGY & ADDITIONAL TESTS:          acetaminophen     Tablet .. 650 milliGRAM(s) Oral every 6 hours PRN  albuterol    90 MICROgram(s) HFA Inhaler 2 Puff(s) Inhalation every 6 hours PRN  aluminum hydroxide/magnesium hydroxide/simethicone Suspension 30 milliLiter(s) Oral every 4 hours PRN  aspirin enteric coated 81 milliGRAM(s) Oral daily  enoxaparin Injectable 40 milliGRAM(s) SubCutaneous every 12 hours  escitalopram 10 milliGRAM(s) Oral daily  famotidine    Tablet 20 milliGRAM(s) Oral daily  lactobacillus acidophilus 1 Tablet(s) Oral every 12 hours  levETIRAcetam 500 milliGRAM(s) Oral two times a day  melatonin 3 milliGRAM(s) Oral at bedtime PRN  methylPREDNISolone sodium succinate Injectable 40 milliGRAM(s) IV Push two times a day  metoprolol tartrate 25 milliGRAM(s) Oral two times a day  montelukast 10 milliGRAM(s) Oral daily  multivitamin 1 Tablet(s) Oral daily  ondansetron Injectable 4 milliGRAM(s) IV Push every 8 hours PRN  polyethylene glycol 3350 17 Gram(s) Oral two times a day  remdesivir  IVPB   IV Intermittent   remdesivir  IVPB 100 milliGRAM(s) IV Intermittent every 24 hours  tamsulosin 0.4 milliGRAM(s) Oral at bedtime  trimethoprim   80 mG/sulfamethoxazole 400 mG 1 Tablet(s) Oral <User Schedule>        
Patient is a 76y old  Male who presents with a chief complaint of hypoxia    Pt is seen earlier   he is feeling better , cardiac monitor with bigeminy / trigeminy reviewed   chart reviewed     HCP his niece Yeimi updated over the phone       MEDICATIONS  (STANDING):  aspirin enteric coated 81 milliGRAM(s) Oral daily  enoxaparin Injectable 40 milliGRAM(s) SubCutaneous every 12 hours  escitalopram 10 milliGRAM(s) Oral daily  famotidine    Tablet 20 milliGRAM(s) Oral daily  lactobacillus acidophilus 1 Tablet(s) Oral every 12 hours  levETIRAcetam 500 milliGRAM(s) Oral two times a day  methylPREDNISolone sodium succinate Injectable 40 milliGRAM(s) IV Push two times a day  metoprolol tartrate 25 milliGRAM(s) Oral two times a day  montelukast 10 milliGRAM(s) Oral daily  multivitamin 1 Tablet(s) Oral daily  polyethylene glycol 3350 17 Gram(s) Oral two times a day  remdesivir  IVPB   IV Intermittent   remdesivir  IVPB 100 milliGRAM(s) IV Intermittent every 24 hours  tamsulosin 0.4 milliGRAM(s) Oral at bedtime  trimethoprim   80 mG/sulfamethoxazole 400 mG 1 Tablet(s) Oral <User Schedule>         Vital Signs Last 24 Hrs  T(C): 36.4 (22 Dec 2022 04:52), Max: 36.7 (21 Dec 2022 17:18)  T(F): 97.5 (22 Dec 2022 04:52), Max: 98 (21 Dec 2022 17:18)  HR: 88 (22 Dec 2022 04:52) (88 - 107)  BP: 111/71 (22 Dec 2022 04:52) (111/71 - 118/69)  BP(mean): --  RR: 18 (22 Dec 2022 04:52) (18 - 18)  SpO2: 94% (21 Dec 2022 21:38) (94% - 96%)    Parameters below as of 22 Dec 2022 04:52  Patient On (Oxygen Delivery Method): nasal cannula  O2 Flow (L/min): 5    PHYSICAL EXAM:  General: awake alert , no distress , cachexia   Neck: supple, no JVD   Lungs: Decreased diminished BS   Cardio: RRR, S1/S2, No murmur  Abdomen: Soft, Nontender, Nondistended; Bowel sounds present  Extremities: No calf tenderness, No pitting edema   Skin warm                12-21    136  |  97  |  29.1<H>  ----------------------------<  154<H>  4.6   |  29.0  |  0.71    Ca    9.0      21 Dec 2022 02:40  Mg     2.1     12-21    TPro  6.3<L>  /  Alb  3.3  /  TBili  0.3<L>  /  DBili  0.1  /  AST  18  /  ALT  16  /  AlkPhos  55  12-21        c< from: CT Angio Chest PE Protocol w/ IV Cont (12.19.22 @ 21:04) >  IMPRESSION:    No pulmonary embolus. Dilated right ventricle and pulmonary artery likely   representing pulmonary hypertension.    Stable severe interstitial lung disease.    --- End of Report ---    < end of copied text >        < from: Transthoracic Echocardiogram (11.13.21 @ 11:19) >  CONCLUSIONS:  1. Mitral annular calcification, otherwise normal mitral  valve. Mild mitral regurgitation.  2. Mildly calcified trileaflet aortic valve with normal  opening. Mild aortic regurgitation.  3. Normal left ventricular internal dimensions and wall  thicknesses.  4. Mild global left ventricular systolic dysfunction. LVEF  45-50%.  5. Mild diastolic dysfunction (Stage I).  6. The right ventricle is not well visualized; grossly  normal right ventricular systolic function.  7. Normal tricuspid valve. Mild tricuspid regurgitation.  8. Estimated pulmonary artery systolic pressure equals 55  mm Hg, assuming right atrial pressure equals 10  mm Hg,  consistent with moderate pulmonary hypertension.  *** No previous Echo exam.  ------------------------------------------------------------------------    < end of copied text >          RADIOLOGY & ADDITIONAL TESTS:      
Patient is a 76y old  Male who presents with a chief complaint of hypoxia    Pt is seen with  Laura , [place] [id number]  pt is confused to place , oriented to self   he sais " I live with my niece in [place] when asked   he is in NH currently prior admission     he is less SOB , now on Oxygen via NC 5 liter/ min       Vital Signs Last 24 Hrs  T(C): 36.6 (21 Dec 2022 04:15), Max: 36.7 (20 Dec 2022 11:55)  T(F): 97.8 (21 Dec 2022 04:15), Max: 98 (20 Dec 2022 11:55)  HR: 72 (21 Dec 2022 05:19) (72 - 96)  BP: 123/72 (21 Dec 2022 05:19) (101/64 - 123/72)  BP(mean): --  RR: 18 (21 Dec 2022 09:06) (16 - 22)  SpO2: 95% (21 Dec 2022 09:06) (92% - 98%)    Parameters below as of 21 Dec 2022 09:06  Patient On (Oxygen Delivery Method): nasal cannula,5L        PHYSICAL EXAM:  General: awake alert , no distress , cachexia   Neck: supple, no JVD   Lungs: Decreased diminished BS   Cardio: RRR, S1/S2, No murmur  Abdomen: Soft, Nontender, Nondistended; Bowel sounds present  Extremities: No calf tenderness, No pitting edema                             14.3   10.41 )-----------( 188      ( 20 Dec 2022 06:07 )             44.3   12-21    136  |  97  |  29.1<H>  ----------------------------<  154<H>  4.6   |  29.0  |  0.71    Ca    9.0      21 Dec 2022 02:40  Phos  4.3     12-20  Mg     2.1     12-21    TPro  6.4<L>  /  Alb  3.3  /  TBili  0.4  /  DBili  x   /  AST  18  /  ALT  19  /  AlkPhos  61  12-19          c< from: CT Angio Chest PE Protocol w/ IV Cont (12.19.22 @ 21:04) >  IMPRESSION:    No pulmonary embolus. Dilated right ventricle and pulmonary artery likely   representing pulmonary hypertension.    Stable severe interstitial lung disease.    --- End of Report ---    < end of copied text >                RADIOLOGY & ADDITIONAL TESTS:

## 2022-12-22 NOTE — CONSULT NOTE ADULT - NS ATTEND AMEND GEN_ALL_CORE FT
agree with metoprolol, no need for more aggressive measures given preserved EF and no sustained arrhythmia. agree with metoprolol, no need for more aggressive measures given preserved EF and no sustained arrhythmia. EPS sign off, call us if need. can follow up as o/p with [facility].

## 2022-12-22 NOTE — CONSULT NOTE ADULT - ASSESSMENT
76 year old Italian speaking male with pmh of htn, gerd, depression, severe interstitial lung disease, diagnosed with COVID last week, who presented to [facility] 12/19/22 from [facility] with progressive dyspnea and hypoxia. He has received steroids and remdesivir with improvement in respiratory status - now on 5L NC only. GOC discussions are ongoing given severe ILD. Since admission, he is noted to have frequent monomorphic PVCs on telemetry, as well as frequent APCs. He has otherwise remained in sinus rhythm with RBBB. Echo demonstrates LVEF 55-60%. EP is consulted for frequent PVCs.     Recommendations:   1. PVCs  - Asymptomatic and likely not high risk in the setting of LVEF 55-60%  - suspect component of subclinical myocarditis in the setting of acute COVID  - Can start metoprolol 25mg BID   - If future associated symptoms or decrease in EF outside of acute illness, would consider outpt monitoring to assess burden.     2. RBBB  - New since last available ECG 11/2021  - No evidence of high grade AV block or high risk bradycardia  - Can consider outpt monitoring pending [facility] planning    Will d/w Dr. Quintana; further recs to follow.

## 2022-12-22 NOTE — PROGRESS NOTE ADULT - ASSESSMENT
76 year old male with pmh of htn, gerd, depression, ILD progressive coming to hospital with complaints of acute shortness of breath. was found to have covid few days prior however sent to hospital as hypoxic and tachypneic. per patient states was sent in as could not breath. denies any pains at this time.     1-Acute Hypoxic Respiratory Failure due to  covid 19 and progressive ILD   -on remdesevir   taper iv steroid   - isolation precautions  pulmonary input appreciated   cont to wean off oxygen as tolerate on NC now off hig flow   - albuterol, montelukast  CTA neg for PE     2- Pulmonary Hypertension   TTE ordered   with kole on cardiac monitor   nov 2021 ECHO EF 45-50   lytes normal     3-seizure hx  - seizure precautions  on  keppra    4-HTN-Essential   - amlodipine  daily     5-h/o Depression     6- Cachexia suspected malnutrition   severe   will get nutrional consult    cont  ensure with meals   MVI     discharge back to NH in 1-2 days likely 
76 year old male with pmh of htn, gerd, depression, ILD progressive coming to hospital with complaints of acute shortness of breath. was found to have covid few days prior however sent to hospital as hypoxic and tachypneic. per patient states was sent in as could not breath. denies any pains at this time.     1-Acute Hypoxic Respiratory Failure due to  covid 19 and progressive ILD   -on remdesevir and iv solumedrol   high flow oxygen taper down as tolerate   - isolation precautions  pulmonary consult appreciated   - albuterol, montelukast  CTA neg for PE   wean off oxygen quickly     2-seizure hx  - seizure precautions  on  keppra    3-HTN-Essential   - amlodipine  daily     4-h/o Depression     5- Cachexia supected malnutrition   severe   will get nutrional consult    add ensure to meals 
76 year old male with pmh of htn, gerd, depression, ILD progressive coming to hospital with complaints of acute shortness of breath. was found to have covid few days prior however sent to hospital as hypoxic and tachypneic. per patient states was sent in as could not breath. denies any pains at this time.     1-Acute Hypoxic Respiratory Failure due to  covid 19 and progressive ILD   taper oxygen as tolerate   on 5 liter via NC   complete course of remdesevir   isolation precautions  discharge in 24 hours likely     2- Bigeminiy / trigeminy   lytes stable   add metoprolol  BP is soft will stop amlodipine   if persist will call EP consult    3-H/o Pulmonary hypertension   TTE ordered   nov 2021 ECHO EF 45-50     4-seizure hx  - seizure precautions  on  keppra    5-H/o HTN   on amlodipine   BP is controlled , low normal   stop amlodipine   metoprolol added as above \    6-H/o depression   cont lexapro     7- Cachexia suspected malnutrition   severe    nutrional consult    cont  ensure with meals   MVI     discharge back to NH in 1-2 days likely 
AHRF in the setting of COVID-19 infection superimposed on severe ILD , likely IPF   Maintained on PO Prednisone 20 mg and approved for Ofev   CT chest with stable severe ILD and without acute findings, making COVID-19 pneumonia or superimposed bacterial infection less likely  Would switch back to oral Prednisone 20 mg (maintenance dose) along with Bactrim TIW for PCP prophylaxis   Supportive treatment with O2 and inhalers , currently on nasal canula which he could be provided at [facility] discussion given advanced ILD   Outpatient Pulmonary follow up (sees Dr Nesbitt)  
76M with hx of advance ILD on home O2, COPD, HTN, depression, seizure, last hospitalization in 3/2022 for ILD acute exacerbation sent from [facility] for acute on chronic respiratory failure secondary to recent COVID infection and possible underlying acute ILD exacerbation, waxing mentation and overall guarded prognosis. Palliative consulted for support and ongoing goc.     Acute on Chronic Respiratory Failure  Advanced ILD, Suspicion for Acute Exacerbation  COVID19 Infection  - on remdesivir and corticosteriods  - maintaining adequate sats on NC 5L  - empiric bactrim for ILD   - pulmonary consulted, input appreciated  - given advancing nature of ILD and hospitalizations for acute exacerbation, progressive debility --> hospice eligible     Cachexia/Anorexia Syndrome  - poor oral intake over past 6 months per niece  - encourage po intake as tolerated  - aspiration precaution  - poor prognostic factor     Debility  - progressive weakness and easily dyspneic with exertion over past 6 months per niece  - please assist with ADLs, fall precaution    Palliative Care Encounter  - HCP is juan josé Safia, scanned form reviewed in patient window --> completed by writer during last hospitalization in 3/2022  - DNR/DNI MOLST -->  scanned form reviewed in patient window  - see GOC above: HCP Niece wishes to pursue hospice evaluation and services at NH facility given advance stage interstitial lung disease. Hospice referral place and to follow up with family.

## 2022-12-22 NOTE — CONSULT NOTE ADULT - SUBJECTIVE AND OBJECTIVE BOX
CARDIAC ELECTROPHYSIOLOGY  [facility]   Office: [address] [address]  Telephone: [phone] Fax:[phone]      76 year old Luxembourgish speaking male with pmh of htn, gerd, depression, severe interstitial lung disease, diagnosed with COVID last week, who presented to [facility] 12/19/22 from [facility] with progressive dyspnea and hypoxia. He has received steroids and remdesivir with improvement in respiratory status - now on 5L NC only. GOC discussions are ongoing given severe ILD. Since admission, he is noted to have frequent monomorphic PVCs on telemetry, as well as frequent APCs. He has otherwise remained in sinus rhythm with RBBB. Echo demonstrates LVEF 55-60%. EP is consulted for frequent PVCs.     Pt reports occasional brief palpitation since admission, but denies palpitation prior to this acute illneess, and further denies  dizziness, chest pain, near/true syncope.     PAST MEDICAL & SURGICAL HISTORY:  Hypertension  BPH (benign prostatic hypertrophy)  HTN (hypertension)  Benign prostate hyperplasia  GERD (gastroesophageal reflux disease)  Depression, major  H/O interstitial lung disease  on 4l via nc  Hernia, abdominal      REVIEW OF SYSTEMS:  CONSTITUTIONAL: weakness  EYES: No visual disturbances  NECK: No pain or stiffness  RESPIRATORY: cough, SOB at rest  CARDIOVASCULAR: see HPI  GASTROINTESTINAL: No abdominal or epigastric pain. No nausea, vomiting, or hematemesis; No diarrhea or constipation. No melena or hematochezia.  NEUROLOGICAL: No nubness or weakness  SKIN: No itching, burning, rashes, or lesions   LYMPH NODES: No enlarged glands  ENDOCRINE: No heat or cold intolerance; No hair loss  PSYCHIATRIC: No depression, anxiety, mood swings, or difficulty sleeping  HEME/LYMPH: No easy bruising, or bleeding gums      MEDICATIONS  (STANDING):  aspirin enteric coated 81 milliGRAM(s) Oral daily  enoxaparin Injectable 40 milliGRAM(s) SubCutaneous every 12 hours  escitalopram 10 milliGRAM(s) Oral daily  famotidine    Tablet 20 milliGRAM(s) Oral daily  lactobacillus acidophilus 1 Tablet(s) Oral every 12 hours  levETIRAcetam 500 milliGRAM(s) Oral two times a day  methylPREDNISolone sodium succinate Injectable 40 milliGRAM(s) IV Push two times a day  metoprolol tartrate 25 milliGRAM(s) Oral two times a day  montelukast 10 milliGRAM(s) Oral daily  multivitamin 1 Tablet(s) Oral daily  polyethylene glycol 3350 17 Gram(s) Oral two times a day  remdesivir  IVPB   IV Intermittent   remdesivir  IVPB 100 milliGRAM(s) IV Intermittent every 24 hours  tamsulosin 0.4 milliGRAM(s) Oral at bedtime  trimethoprim   80 mG/sulfamethoxazole 400 mG 1 Tablet(s) Oral <User Schedule>    MEDICATIONS  (PRN):  acetaminophen     Tablet .. 650 milliGRAM(s) Oral every 6 hours PRN Temp greater or equal to 38C (100.4F), Mild Pain (1 - 3)  albuterol    90 MICROgram(s) HFA Inhaler 2 Puff(s) Inhalation every 6 hours PRN Shortness of Breath and/or Wheezing  aluminum hydroxide/magnesium hydroxide/simethicone Suspension 30 milliLiter(s) Oral every 4 hours PRN Dyspepsia  melatonin 3 milliGRAM(s) Oral at bedtime PRN Insomnia  ondansetron Injectable 4 milliGRAM(s) IV Push every 8 hours PRN Nausea and/or Vomiting      Allergies  No Known Allergies    SOCIAL HISTORY: lives at [facility]; Niece is HCP    FAMILY HISTORY:  FH: HTN (hypertension)        Vital Signs Last 24 Hrs  T(C): 36.4 (22 Dec 2022 04:52), Max: 36.7 (21 Dec 2022 17:18)  T(F): 97.5 (22 Dec 2022 04:52), Max: 98 (21 Dec 2022 17:18)  HR: 88 (22 Dec 2022 04:52) (88 - 100)  BP: 111/71 (22 Dec 2022 04:52) (111/71 - 117/78)  RR: 18 (22 Dec 2022 04:52) (18 - 18)  SpO2: 94% (21 Dec 2022 21:38) (94% - 95%)    Parameters below as of 22 Dec 2022 04:52  Patient On (Oxygen Delivery Method): nasal cannula  O2 Flow (L/min): 5      Physical Exam:  Constitutional: cachectic; AAOx3, NAD  Neck: supple, No JVD  Cardiovascular: +S1S2 RRR  Pulmonary: diffuse rhonchi b/l, unlabored, 5L NC  Abdomen: +BS, soft NTND  Extremities: no edema b/l, +distal pulses b/l  Neuro: non focal, speech clear, GARLAND x 4    LABS:  12-21    136  |  97  |  29.1<H>  ----------------------------<  154<H>  4.6   |  29.0  |  0.71    Ca    9.0      21 Dec 2022 02:40  Mg     2.1     12-21    TPro  6.3<L>  /  Alb  3.3  /  TBili  0.3<L>  /  DBili  0.1  /  AST  18  /  ALT  16  /  AlkPhos  55  12-21  LIVER FUNCTIONS - ( 21 Dec 2022 02:40 )  Alb: 3.3 g/dL / Pro: 6.3 g/dL / ALK PHOS: 55 U/L / ALT: 16 U/L / AST: 18 U/L / GGT: x             RADIOLOGY & ADDITIONAL STUDIES:  < from: TTE Echo Complete w/ Contrast w/ Doppler (12.21.22 @ 20:15) >  Summary:   1. There is mild concentric left ventricular hypertrophy.   2. Normal global left ventricular systolic function.   3. Left ventricular ejection fraction, by visual estimation, is 55 to   60%.   4. Elevated mean left atrial pressure.   5. Spectral Doppler shows impaired relaxation pattern of left   ventricular myocardial filling (Grade I diastolic dysfunction).   6. Mildly enlarged right ventricle. RV systolic function is low normal   7. Right ventricular volume and pressure overload.   8. Mild aortic regurgitation.   9. Mild mitral valve regurgitation.  10. Mild thickening of the anterior and posterior mitral valve leaflets.  11. Mild tricuspid regurgitation.  12. Estimated pulmonary artery systolic pressure is 44.2 mmHg assuming a   right atrial pressure of 3 mmHg, which is consistent withmild pulmonary   hypertension.  13. Dilatation of the aortic root (3.7cm) and ascending aorta (3.9cm)  14. Moderately dilated pulmonary artery.    MD Gurdeep Electronically signed on 12/22/2022 at 12:51:02 PM    < end of copied text >    
HPI:    76 year old male with PMH of HTN, GERD, depression, ILD progressive coming to hospital with complaints of acute shortness of breath. He was found to have COVID  few days prior however sent to hospital as hypoxic and tachypneic. per patient states was sent in as could not breath. States that he have some dry cough but no significant phlegm No chest pain. No Le edema . He has been seeing Pulmonary recently planned to be started on Ofev but did not seem to get it.       PAST MEDICAL & SURGICAL HISTORY:    Hypertension  BPH (benign prostatic hypertrophy)  HTN (hypertension)  Benign prostate hyperplasia  GERD (gastroesophageal reflux disease)  Depression, major  H/O interstitial lung disease  on 4l via nc  Hernia, abdominal    PHYSICAL EXAM:    T(C): 36.6 (12-20-22 @ 04:53), Max: 36.8 (12-19-22 @ 15:04)  HR: 96 (12-20-22 @ 07:35)  BP: 119/78 (12-20-22 @ 07:35)  RR: 18 (12-20-22 @ 04:53)  SpO2: 96% (12-20-22 @ 04:53)      GENERAL: comfortable, not in acute distress   HEENT: Anicteric sclera, EOMI, no LAD  CHEST/LUNG: on HFNC 45%, bilateral rhonchi   HEART: Regular rate and rhythm; No murmurs, rubs, or gallops  ABDOMEN: Soft, Nontender, Nondistended; Bowel sounds present  EXTREMITIES: No clubbing, cyanosis, or edema      LABS:                          14.3   10.41 )-----------( 188      ( 20 Dec 2022 06:07 )             44.3     12-20    136  |  96  |  15.9  ----------------------------<  145<H>  4.7   |  30.0<H>  |  0.74    Ca    9.1      20 Dec 2022 06:07  Phos  4.3     12-20  Mg     1.9     12-20    TPro  6.4<L>  /  Alb  3.3  /  TBili  0.4  /  DBili  x   /  AST  18  /  ALT  19  /  AlkPhos  61  12-19    PT/INR - ( 19 Dec 2022 11:45 )   PT: 11.8 sec;   INR: 1.02 ratio         PTT - ( 19 Dec 2022 11:45 )  PTT:26.7 sec  CARDIAC MARKERS ( 19 Dec 2022 11:45 )  x     / x     / 19 U/L / x     / x          LIVER FUNCTIONS - ( 19 Dec 2022 11:45 )  Alb: 3.3 g/dL / Pro: 6.4 g/dL / ALK PHOS: 61 U/L / ALT: 19 U/L / AST: 18 U/L / GGT: x             MEDICATIONS:    amLODIPine   Tablet 5 milliGRAM(s) Oral daily  enoxaparin Injectable 40 milliGRAM(s) SubCutaneous every 12 hours  escitalopram 10 milliGRAM(s) Oral daily  famotidine   tablet 20 milliGRAM(s) Oral daily  lactobacillus acidophilus 1 Tablet(s) Oral every 12 hours  levETIRAcetam 500 milliGRAM(s) Oral two times a day  methylPREDNISolone sodium succinate Injectable 60 milliGRAM(s) IV Push every 8 hours  montelukast 10 milliGRAM(s) Oral daily  multivitamin 1 Tablet(s) Oral daily  polyethylene glycol 3350 17 Gram(s) Oral two times a day  remdesivir  IVPB 100 milliGRAM(s) IV Intermittent every 24 hours  tamsulosin 0.4 milliGRAM(s) Oral at bedtime  trimethoprim   80 mG/sulfamethoxazole 400 mG 1 Tablet(s) Oral <User Schedule>  acetaminophen     Tablet .. 650 milliGRAM(s) Oral every 6 hours PRN Temp greater or equal to 38C (100.4F), Mild Pain (1 - 3)  albuterol    90 MICROgram(s) HFA Inhaler 2 Puff(s) Inhalation every 6 hours PRN Shortness of Breath and/or Wheezing  aluminum hydroxide/magnesium hydroxide/simethicone Suspension 30 milliLiter(s) Oral every 4 hours PRN Dyspepsia  melatonin 3 milliGRAM(s) Oral at bedtime PRN Insomnia  ondansetron Injectable 4 milliGRAM(s) IV Push every 8 hours PRN Nausea and/or Vomiting    
[facility] PALLIATIVE MEDICINE CONSULT    CC: Patient is a 76y old  Male who presents with a chief complaint of hypoxia (20 Dec 2022 15:04)    HPI:    "76 year old male with pmh of htn, gerd, depression, ILD progressive coming to hospital with complaints of acute shortness of breath. was found to have covid few days prior however sent to hospital as hypoxic and tachypneic. per patient states was sent in as could not breath. denies any pains at this time." (19 Dec 2022 16:53)    Mr. Bryant is a 76 year old male with PMH of ILD on 4L O2NC, COPD, HTN, depression, BPH, GERD, seizures, hospitalization in 3/2022 for acute on chronic respiratory failure 2/2 ILD exacerbation, resident of [facility] sent to [facility] for worsening shortness of breath. ROS positive +COVID a few days ago. Admitted for acute on chronic respiratory failure 2/2 COVID and ILD. Palliative consulted for support and ongoing goc. Pt is known to our palliative service seen back in 3/2022 at which time he designated HCP to juan josé Baig and wanted to continue all active treatments. Maddyjessie was going to continue active GOC on discharge given his advance ILD status.     Present Symptoms:     Dyspnea: NC 5L since this AM   Nausea/Vomiting:  No  Anxiety:   No  Depression:  No  Fatigue: Yes    Loss of appetite: Yes   Constipation:  No    Pain: No            Character-            Duration-            Effect-            Factors-            Frequency-            Location-            Severity-    Pain AD Score:  [email or web address].[email or web address]/cog/[email or web address] (press ctrl + left click to view)    Review of Systems: as per RN/staff due to COVID isolation protocol and to limit spread of infection. +intermittent confusion    PERTINENT PMH REVIEWED: Yes      PAST MEDICAL & SURGICAL HISTORY:  Hypertension  BPH (benign prostatic hypertrophy)  HTN (hypertension)  Benign prostate hyperplasia  GERD (gastroesophageal reflux disease)  Depression, major  H/O interstitial lung disease, on 4l via nc  Hernia, abdominal      FAMILY HISTORY:  FH: HTN (hypertension)  Unable to obtain due to confusion      Allergies    No Known Allergies    Intolerances        SOCIAL HISTORY:                      Substance history:                    Admitted from:  [facility]                    Children:                     Christian/spirituality:                    Cultural concerns:       DECISION MAKER(s):[] Health Care Proxy(s)  [] Surrogate(s)  [] Guardian             HCP is juan josé Baig, form scanned in patient window    ADVANCE DIRECTIVES/TREATMENT PREFERENCES: DNR/DNI MOLST scanned in patient window     Karnofsky/Palliative Performance Status Version 2:  %  http://npcrc.org/files/news/palliative_performance_scale_[email or web address].pdf    Baseline ADLs (prior to admission): n/a      MEDICATIONS  (STANDING):  amLODIPine   Tablet 5 milliGRAM(s) Oral daily  enoxaparin Injectable 40 milliGRAM(s) SubCutaneous every 12 hours  escitalopram 10 milliGRAM(s) Oral daily  famotidine    Tablet 20 milliGRAM(s) Oral daily  lactobacillus acidophilus 1 Tablet(s) Oral every 12 hours  levETIRAcetam 500 milliGRAM(s) Oral two times a day  methylPREDNISolone sodium succinate Injectable 40 milliGRAM(s) IV Push three times a day  montelukast 10 milliGRAM(s) Oral daily  multivitamin 1 Tablet(s) Oral daily  polyethylene glycol 3350 17 Gram(s) Oral two times a day  remdesivir  IVPB   IV Intermittent   remdesivir  IVPB 100 milliGRAM(s) IV Intermittent every 24 hours  tamsulosin 0.4 milliGRAM(s) Oral at bedtime  trimethoprim   80 mG/sulfamethoxazole 400 mG 1 Tablet(s) Oral <User Schedule>    MEDICATIONS  (PRN):  acetaminophen     Tablet .. 650 milliGRAM(s) Oral every 6 hours PRN Temp greater or equal to 38C (100.4F), Mild Pain (1 - 3)  albuterol    90 MICROgram(s) HFA Inhaler 2 Puff(s) Inhalation every 6 hours PRN Shortness of Breath and/or Wheezing  aluminum hydroxide/magnesium hydroxide/simethicone Suspension 30 milliLiter(s) Oral every 4 hours PRN Dyspepsia  melatonin 3 milliGRAM(s) Oral at bedtime PRN Insomnia  ondansetron Injectable 4 milliGRAM(s) IV Push every 8 hours PRN Nausea and/or Vomiting    Physical Exam:  Due to the nature of this patient's COVID-19 isolation status, no bedside physical exam was done to limit spread of infection. Examination highlights were provided by bedside nurse and/or primary team. Objective data were reviewed in detail.    Vital Signs Last 24 Hrs  T(C): 36.6 (21 Dec 2022 04:15), Max: 36.7 (20 Dec 2022 11:55)  T(F): 97.8 (21 Dec 2022 04:15), Max: 98 (20 Dec 2022 11:55)  HR: 72 (21 Dec 2022 05:19) (72 - 96)  BP: 123/72 (21 Dec 2022 05:19) (101/64 - 123/72)  BP(mean): --  RR: 18 (21 Dec 2022 09:06) (16 - 22)  SpO2: 95% (21 Dec 2022 09:06) (92% - 98%)    Parameters below as of 21 Dec 2022 09:06  Patient On (Oxygen Delivery Method): nasal cannula,5L    LABS:                        14.3   10.41 )-----------( 188      ( 20 Dec 2022 06:07 )             44.3     12-21    136  |  97  |  29.1<H>  ----------------------------<  154<H>  4.6   |  29.0  |  0.71    Ca    9.0      21 Dec 2022 02:40  Phos  4.3     12-20  Mg     2.1     12-21    TPro  6.4<L>  /  Alb  3.3  /  TBili  0.4  /  DBili  x   /  AST  18  /  ALT  19  /  AlkPhos  61  12-19    PT/INR - ( 19 Dec 2022 11:45 )   PT: 11.8 sec;   INR: 1.02 ratio         PTT - ( 19 Dec 2022 11:45 )  PTT:26.7 sec    I&O's Summary      RADIOLOGY & ADDITIONAL STUDIES:    NEUROLOGICAL MEDICATIONS/OPIOIDS/BENZODIAZEPINE OVER PAST 24 HOURS    escitalopram   10 milliGRAM(s) Oral (12-20-22 @ 13:29)    levETIRAcetam   500 milliGRAM(s) Oral (12-21-22 @ 05:25)   500 milliGRAM(s) Oral (12-20-22 @ 18:10)

## 2022-12-22 NOTE — PROGRESS NOTE ADULT - TIME BILLING
D/W RN, hospitalist juan josé Alex, LAWRENCE LEE Martinez    Chart review, meds, labs, imaging, coordination of care with other providers and disciplines, counseling family and obtaining collateral history of baseline function, progression of ILD and goals of care, hospice  services and advance directives

## 2022-12-22 NOTE — PROGRESS NOTE ADULT - CONVERSATION DETAILS
Spoke with Niece/HCP Safia to reintroduce role of palliative care team. She recalls our prior conversation back in March 2022. She shared that since that hospitalization, Uncle had been progressively declining at nursing home with increasing shortness of breath with exertion and notable intermittent confusion with the family. She feels his overall quality of life has significant worsen especially given his known advance interstitial lung disease. She shared that at another hospitalization (?OSH), hospice was broached at which time she felt it was appropriate to initiate service. However, services were never implemented in place and "unsure why."  Lala is very familiar with hospice as she works for an agency in [facility]. She is still very much interested in hospice services at [facility] and requesting referral for evaluation. She is contemplating about bringing her Uncle to a NH closer to [facility] where she reside to be able to check in on him more frequently. However, she is currently dealing with acute health issues after "pulling out my back" that has put a hold on that plan. Reassurance provided and all questions answered.     Advance directives reviewed. Lala confirmed completion of MOLST at NH for DNR/DNI.     D/W CM D Martinez for hospice consult.  Updated Hospitalist on above conversation.

## 2022-12-23 ENCOUNTER — TRANSCRIPTION ENCOUNTER (OUTPATIENT)
Age: 77
End: 2022-12-23

## 2022-12-23 VITALS
SYSTOLIC BLOOD PRESSURE: 104 MMHG | DIASTOLIC BLOOD PRESSURE: 55 MMHG | TEMPERATURE: 98 F | RESPIRATION RATE: 19 BRPM | OXYGEN SATURATION: 100 % | HEART RATE: 80 BPM

## 2022-12-23 PROCEDURE — 80053 COMPREHEN METABOLIC PANEL: CPT

## 2022-12-23 PROCEDURE — 87040 BLOOD CULTURE FOR BACTERIA: CPT

## 2022-12-23 PROCEDURE — 71045 X-RAY EXAM CHEST 1 VIEW: CPT

## 2022-12-23 PROCEDURE — 94640 AIRWAY INHALATION TREATMENT: CPT

## 2022-12-23 PROCEDURE — 83605 ASSAY OF LACTIC ACID: CPT

## 2022-12-23 PROCEDURE — 82306 VITAMIN D 25 HYDROXY: CPT

## 2022-12-23 PROCEDURE — U0005: CPT

## 2022-12-23 PROCEDURE — 85018 HEMOGLOBIN: CPT

## 2022-12-23 PROCEDURE — 99291 CRITICAL CARE FIRST HOUR: CPT

## 2022-12-23 PROCEDURE — 0225U NFCT DS DNA&RNA 21 SARSCOV2: CPT

## 2022-12-23 PROCEDURE — 85025 COMPLETE CBC W/AUTO DIFF WBC: CPT

## 2022-12-23 PROCEDURE — 99239 HOSP IP/OBS DSCHRG MGMT >30: CPT

## 2022-12-23 PROCEDURE — 86140 C-REACTIVE PROTEIN: CPT

## 2022-12-23 PROCEDURE — U0003: CPT

## 2022-12-23 PROCEDURE — 82330 ASSAY OF CALCIUM: CPT

## 2022-12-23 PROCEDURE — 83880 ASSAY OF NATRIURETIC PEPTIDE: CPT

## 2022-12-23 PROCEDURE — 93005 ELECTROCARDIOGRAM TRACING: CPT

## 2022-12-23 PROCEDURE — 84443 ASSAY THYROID STIM HORMONE: CPT

## 2022-12-23 PROCEDURE — 82435 ASSAY OF BLOOD CHLORIDE: CPT

## 2022-12-23 PROCEDURE — 82803 BLOOD GASES ANY COMBINATION: CPT

## 2022-12-23 PROCEDURE — 82947 ASSAY GLUCOSE BLOOD QUANT: CPT

## 2022-12-23 PROCEDURE — 84295 ASSAY OF SERUM SODIUM: CPT

## 2022-12-23 PROCEDURE — 85610 PROTHROMBIN TIME: CPT

## 2022-12-23 PROCEDURE — 84132 ASSAY OF SERUM POTASSIUM: CPT

## 2022-12-23 PROCEDURE — 82550 ASSAY OF CK (CPK): CPT

## 2022-12-23 PROCEDURE — 85014 HEMATOCRIT: CPT

## 2022-12-23 PROCEDURE — 80048 BASIC METABOLIC PNL TOTAL CA: CPT

## 2022-12-23 PROCEDURE — 85730 THROMBOPLASTIN TIME PARTIAL: CPT

## 2022-12-23 PROCEDURE — C8929: CPT

## 2022-12-23 PROCEDURE — 96374 THER/PROPH/DIAG INJ IV PUSH: CPT

## 2022-12-23 PROCEDURE — 82728 ASSAY OF FERRITIN: CPT

## 2022-12-23 PROCEDURE — 94760 N-INVAS EAR/PLS OXIMETRY 1: CPT

## 2022-12-23 PROCEDURE — 71275 CT ANGIOGRAPHY CHEST: CPT

## 2022-12-23 PROCEDURE — 84145 PROCALCITONIN (PCT): CPT

## 2022-12-23 PROCEDURE — 85379 FIBRIN DEGRADATION QUANT: CPT

## 2022-12-23 PROCEDURE — 80076 HEPATIC FUNCTION PANEL: CPT

## 2022-12-23 PROCEDURE — 85027 COMPLETE CBC AUTOMATED: CPT

## 2022-12-23 PROCEDURE — 36415 COLL VENOUS BLD VENIPUNCTURE: CPT

## 2022-12-23 PROCEDURE — 83735 ASSAY OF MAGNESIUM: CPT

## 2022-12-23 PROCEDURE — 84100 ASSAY OF PHOSPHORUS: CPT

## 2022-12-23 RX ORDER — AMLODIPINE BESYLATE 2.5 MG/1
1 TABLET ORAL
Qty: 0 | Refills: 0 | DISCHARGE

## 2022-12-23 RX ORDER — METOPROLOL TARTRATE 50 MG
1 TABLET ORAL
Qty: 0 | Refills: 0 | DISCHARGE
Start: 2022-12-23

## 2022-12-23 RX ORDER — ASPIRIN/CALCIUM CARB/MAGNESIUM 324 MG
1 TABLET ORAL
Qty: 0 | Refills: 0 | DISCHARGE
Start: 2022-12-23

## 2022-12-23 RX ORDER — ENOXAPARIN SODIUM 100 MG/ML
40 INJECTION SUBCUTANEOUS
Qty: 0 | Refills: 0 | DISCHARGE
Start: 2022-12-23

## 2022-12-23 RX ADMIN — FAMOTIDINE 20 MILLIGRAM(S): 10 INJECTION INTRAVENOUS at 12:07

## 2022-12-23 RX ADMIN — Medication 20 MILLIGRAM(S): at 12:07

## 2022-12-23 RX ADMIN — Medication 1 TABLET(S): at 12:07

## 2022-12-23 RX ADMIN — ENOXAPARIN SODIUM 40 MILLIGRAM(S): 100 INJECTION SUBCUTANEOUS at 06:15

## 2022-12-23 RX ADMIN — Medication 81 MILLIGRAM(S): at 13:12

## 2022-12-23 RX ADMIN — MONTELUKAST 10 MILLIGRAM(S): 4 TABLET, CHEWABLE ORAL at 12:06

## 2022-12-23 RX ADMIN — ESCITALOPRAM OXALATE 10 MILLIGRAM(S): 10 TABLET, FILM COATED ORAL at 12:07

## 2022-12-23 RX ADMIN — LEVETIRACETAM 500 MILLIGRAM(S): 250 TABLET, FILM COATED ORAL at 06:15

## 2022-12-23 RX ADMIN — POLYETHYLENE GLYCOL 3350 17 GRAM(S): 17 POWDER, FOR SOLUTION ORAL at 06:14

## 2022-12-23 RX ADMIN — Medication 1 TABLET(S): at 06:14

## 2022-12-23 NOTE — DIETITIAN INITIAL EVALUATION ADULT - NS FNS DIET ORDER
Diet, DASH/TLC:   Sodium & Cholesterol Restricted  Supplement Feeding Modality:  Oral  Ensure Enlive Cans or Servings Per Day:  1       Frequency:  Three Times a day (12-19-22 @ 16:49)

## 2022-12-23 NOTE — DIETITIAN INITIAL EVALUATION ADULT - ORAL INTAKE PTA/DIET HISTORY
Pt sleeping soundly/unarousable to voice. Per RN Pt completing ~25% of meals, drinking Ensure well. Per previous RD assessment (March 2022) Pt had reported UBW ~130lbs, documented weight 113lbs. RD bedscale at this admission 108lbs question accuracy though visually appears more accurate than documented admission weight 165lbs. Discussed with RN to encourage Pt to place meal orders/request RSA. Communicated with diet office staff.

## 2022-12-23 NOTE — DIETITIAN INITIAL EVALUATION ADULT - PERTINENT MEDS FT
MEDICATIONS  (STANDING):  aspirin enteric coated 81 milliGRAM(s) Oral daily  enoxaparin Injectable 40 milliGRAM(s) SubCutaneous every 12 hours  escitalopram 10 milliGRAM(s) Oral daily  famotidine    Tablet 20 milliGRAM(s) Oral daily  lactobacillus acidophilus 1 Tablet(s) Oral every 12 hours  levETIRAcetam 500 milliGRAM(s) Oral two times a day  metoprolol tartrate 25 milliGRAM(s) Oral two times a day  montelukast 10 milliGRAM(s) Oral daily  multivitamin 1 Tablet(s) Oral daily  polyethylene glycol 3350 17 Gram(s) Oral two times a day  predniSONE   Tablet 20 milliGRAM(s) Oral daily  remdesivir  IVPB 100 milliGRAM(s) IV Intermittent every 24 hours  remdesivir  IVPB   IV Intermittent   tamsulosin 0.4 milliGRAM(s) Oral at bedtime  trimethoprim   80 mG/sulfamethoxazole 400 mG 1 Tablet(s) Oral <User Schedule>    MEDICATIONS  (PRN):  acetaminophen     Tablet .. 650 milliGRAM(s) Oral every 6 hours PRN Temp greater or equal to 38C (100.4F), Mild Pain (1 - 3)  albuterol    90 MICROgram(s) HFA Inhaler 2 Puff(s) Inhalation every 6 hours PRN Shortness of Breath and/or Wheezing  aluminum hydroxide/magnesium hydroxide/simethicone Suspension 30 milliLiter(s) Oral every 4 hours PRN Dyspepsia  melatonin 3 milliGRAM(s) Oral at bedtime PRN Insomnia  ondansetron Injectable 4 milliGRAM(s) IV Push every 8 hours PRN Nausea and/or Vomiting

## 2022-12-23 NOTE — DIETITIAN NUTRITION RISK NOTIFICATION - ADDITIONAL COMMENTS/DIETITIAN RECOMMENDATIONS
1) Liberalize to Regular diet   2) Continue Ensure TID  3) Continue MVI daily  4) Encourage HBV protein sources   5) Monitor weights daily for trend/accuracy

## 2022-12-23 NOTE — DISCHARGE NOTE NURSING/CASE MANAGEMENT/SOCIAL WORK - NSDCFUADDAPPT_GEN_ALL_CORE_FT
Patient will return to [facility] upon discharge- patient is a long term resident. SW spoke with patient's niece, Safia Bryant ([phone]). Patient's niece is in agreement with discharge plan.

## 2022-12-23 NOTE — DIETITIAN INITIAL EVALUATION ADULT - OTHER INFO
76 year old male with pmh of htn, gerd, depression, ILD progressive coming to hospital with complaints of acute shortness of breath. was found to have covid few days prior however sent to hospital as hypoxic and tachypneic. per patient states was sent in as could not breath.

## 2022-12-23 NOTE — DISCHARGE NOTE PROVIDER - HOSPITAL COURSE
76 year old male with PMHx of HTN, GERD, depression, progressive ILD, recently tested positive for COVID, who presented to [facility] ED with complaints of acute shortness of breath. In the ED, patient found to be hypoxic and tachypneic requiring HFNC. Patient was started on IV steroids, remdesivir, and admitted for management of acute hypoxic respiratory failure in the setting of progressive ILD and COVID PNA. CTA returned negative for PE. Pulmonology and Palliative care consulted. Steroid was tapered and O2 requirements were weaned down to nasal canula. During admission patient noted to have ventricular bigeminy/trigeminy on telemetry monitoring though clinically asymptomatic. Evaluated by EP and initiated on metoprolol. Electrolytes monitored closely and repleted as needed. Ongoing goals of care discussions held, family opted for DNR/DNI with discharge to inpatient hospice. At this time, patient is medically stable for discharge with hospice services. 76 year old male with PMHx of HTN, GERD, depression, progressive ILD, recently tested positive for COVID, who presented to [facility] ED with complaints of acute shortness of breath. In the ED, patient found to be hypoxic and tachypneic requiring HFNC. Patient was started on IV steroids, remdesivir, and admitted for management of acute hypoxic respiratory failure in the setting of progressive ILD and COVID PNA. CTA returned negative for PE. Pulmonology and Palliative care consulted. Steroid was tapered and O2 requirements were weaned down to nasal canula. During admission patient noted to have ventricular bigeminy/trigeminy on telemetry monitoring though clinically asymptomatic. Evaluated by EP and initiated on metoprolol. Electrolytes monitored closely and repleted as needed. Ongoing goals of care discussions held,HCP is the niece opted for comfort hospice on DC back to NH    At this time, patient is medically stable for discharge with hospice services.

## 2022-12-23 NOTE — DISCHARGE NOTE PROVIDER - NSDCFUSCHEDAPPT_GEN_ALL_CORE_FT
Tl Nesbitt  [facility]  P[address]ED [address]  Scheduled Appointment: 12/30/2022

## 2022-12-23 NOTE — DISCHARGE NOTE PROVIDER - ATTENDING DISCHARGE PHYSICAL EXAMINATION:
pt is stable , monitor PVC's   EP consult appreciated   palliative care input appreciated noted , HCP wishes comfort care hospice on DC back to NH   Vital Signs Last 24 Hrs  T(C): 36.9 (23 Dec 2022 11:54), Max: 36.9 (23 Dec 2022 11:54)  T(F): 98.4 (23 Dec 2022 11:54), Max: 98.4 (23 Dec 2022 11:54)  HR: 75 (23 Dec 2022 11:54) (75 - 85)  BP: 100/62 (23 Dec 2022 11:54) (100/62 - 109/72)  BP(mean): --  RR: 19 (23 Dec 2022 11:54) (18 - 19)  SpO2: 97% (23 Dec 2022 04:52) (97% - 97%)    General : cachectic   Lungs : CTA bilateral   Heart : regular s1 /.s2   Abd soft no tenderness , BS +   Ext : no edema

## 2022-12-23 NOTE — DIETITIAN NUTRITION RISK NOTIFICATION - TREATMENT: THE FOLLOWING DIET HAS BEEN RECOMMENDED
Diet, DASH/TLC:   Sodium & Cholesterol Restricted  Supplement Feeding Modality:  Oral  Ensure Enlive Cans or Servings Per Day:  1       Frequency:  Three Times a day (12-19-22 @ 16:49) [Active]

## 2022-12-23 NOTE — DISCHARGE NOTE NURSING/CASE MANAGEMENT/SOCIAL WORK - PATIENT PORTAL LINK FT
You can access the FollowMyHealth Patient Portal offered by [facility] by registering at the following website: [email or web address]. By joining [facility]’s FollowMyHealth portal, you will also be able to view your health information using other applications (apps) compatible with our system.

## 2022-12-23 NOTE — DIETITIAN INITIAL EVALUATION ADULT - ETIOLOGY
related to inability to meet sufficient protein-energy needs in setting of acute hypoxic resp failure 2/2 COVID superimposed on severe ILD

## 2022-12-23 NOTE — DISCHARGE NOTE PROVIDER - NSDCMRMEDTOKEN_GEN_ALL_CORE_FT
acetaminophen 325 mg oral tablet: 2 tab(s) orally every 6 hours, As needed, Temp greater or equal to 38C (100.4F), Mild Pain (1 - 3)  amLODIPine 5 mg oral tablet: 1 tab(s) orally once a day  Bactrim 400 mg-80 mg oral tablet: 1 tab(s) orally Monday, Wednesday, and Friday  escitalopram 10 mg oral tablet: 1 tab(s) orally once a day  famotidine 10 mg oral tablet: 1 tab(s) orally every 12 hours  Keppra 500 mg oral tablet: 1 tab(s) orally 2 times a day   lactobacillus acidophilus oral tablet: 1 tab(s) orally every 12 hours  Multiple Vitamins oral tablet: 1 tab(s) orally once a day  polyethylene glycol 3350 oral powder for reconstitution: 17 gram(s) orally 2 times a day  predniSONE 20 mg oral tablet: 1 tab(s) orally once a day  Singulair 10 mg oral tablet: 1 tab(s) orally once a day  tamsulosin 0.4 mg oral capsule: 1 cap(s) orally once a day   Trelegy Ellipta 200 mcg-62.5 mcg-25 mcg/inh inhalation powder: 1 puff(s) inhaled once a day  Ventolin HFA 90 mcg/inh inhalation aerosol: 2 puff(s) inhaled every 6 hours, As Needed    acetaminophen 325 mg oral tablet: 2 tab(s) orally every 6 hours, As needed, Temp greater or equal to 38C (100.4F), Mild Pain (1 - 3)  aspirin 81 mg oral delayed release tablet: 1 tab(s) orally once a day  Bactrim 400 mg-80 mg oral tablet: 1 tab(s) orally Monday, Wednesday, and Friday  enoxaparin: 40 milligram(s) subcutaneous once a day  escitalopram 10 mg oral tablet: 1 tab(s) orally once a day  famotidine 10 mg oral tablet: 1 tab(s) orally every 12 hours  Keppra 500 mg oral tablet: 1 tab(s) orally 2 times a day   lactobacillus acidophilus oral tablet: 1 tab(s) orally every 12 hours  metoprolol tartrate 25 mg oral tablet: 1 tab(s) orally 2 times a day  Multiple Vitamins oral tablet: 1 tab(s) orally once a day  polyethylene glycol 3350 oral powder for reconstitution: 17 gram(s) orally 2 times a day  predniSONE 20 mg oral tablet: 1 tab(s) orally once a day  Singulair 10 mg oral tablet: 1 tab(s) orally once a day  tamsulosin 0.4 mg oral capsule: 1 cap(s) orally once a day   Trelegy Ellipta 200 mcg-62.5 mcg-25 mcg/inh inhalation powder: 1 puff(s) inhaled once a day  Ventolin HFA 90 mcg/inh inhalation aerosol: 2 puff(s) inhaled every 6 hours, As Needed

## 2022-12-23 NOTE — DISCHARGE NOTE PROVIDER - NSDCCPCAREPLAN_GEN_ALL_CORE_FT
PRINCIPAL DISCHARGE DIAGNOSIS  Diagnosis: Respiratory failure  Assessment and Plan of Treatment: - Admitted with acute respiratory failure in the setting of advanced ILD and covid infection. Treated with steroids and remdesivir.   - Oxygen requirements improved during hospitalization, now tolerating oxygen via nasal canula.   - Evaluated by palliative care, will be discharged to hospice.      SECONDARY DISCHARGE DIAGNOSES  Diagnosis: 2019 novel coronavirus disease (COVID-19)  Assessment and Plan of Treatment: - Treated as above.    Diagnosis: ILD (interstitial lung disease)  Assessment and Plan of Treatment: - Treated as above. Followed by Pulmonology.    Diagnosis: Ventricular bigeminy  Assessment and Plan of Treatment: - And trigeminy noted on telemetry, asymptomatic.   - Evaluated by EP and started on metoprolol.   - Family opting for limited measures/workup and discharged to hospice.    Diagnosis: Cachexia  Assessment and Plan of Treatment: - Poor oral intake over the past 6 months in the setting of advanced disease.   - Aspiration precautions    Diagnosis: Respiratory failure  Assessment and Plan of Treatment:     Diagnosis: Palliative care encounter  Assessment and Plan of Treatment: - Evaluated by palliative care, will be discharged to hospice.     PRINCIPAL DISCHARGE DIAGNOSIS  Diagnosis: Respiratory failure  Assessment and Plan of Treatment: - Admitted with acute respiratory failure in the setting of advanced ILD and covid infection. Treated with steroids and remdesivir.   - Oxygen requirements improved during hospitalization, now tolerating oxygen via nasal canula at 3 liter /min  - Evaluated by palliative care, will be discharged to NH with hsopice per HCP decision      SECONDARY DISCHARGE DIAGNOSES  Diagnosis: 2019 novel coronavirus disease (COVID-19)  Assessment and Plan of Treatment: - Treated as above.    Diagnosis: ILD (interstitial lung disease)  Assessment and Plan of Treatment: - Treated as above. Followed by Pulmonology.    Diagnosis: Ventricular bigeminy  Assessment and Plan of Treatment: - And trigeminy noted on telemetry, asymptomatic.   - Evaluated by EP and started on metoprolol.    Diagnosis: Cachexia  Assessment and Plan of Treatment: - Poor oral intake over the past 6 months in the setting of advanced disease.   -    Diagnosis: Palliative care encounter  Assessment and Plan of Treatment: - Evaluated by palliative care, will be discharged to hospice.    Diagnosis: Interstitial pulmonary fibrosis  Assessment and Plan of Treatment:     Diagnosis: Severe protein-calorie malnutrition  Assessment and Plan of Treatment:

## 2022-12-30 ENCOUNTER — APPOINTMENT (OUTPATIENT)
Dept: PULMONOLOGY | Facility: CLINIC | Age: 77
End: 2022-12-30

## 2023-01-16 NOTE — DISCHARGE NOTE NURSING/CASE MANAGEMENT/SOCIAL WORK - NSDCPETBCESMAN_GEN_ALL_CORE
LOR following for DC planning.    Pt accepted to [facility] and insurance has approved SNF level of care, auth expires on 1/19.    Dr. Granados made aware. Will assist with transfer to [facility] once pt is stable for dc.    Tete Carbajal MSW, LSW  [id number]   If you are a smoker, it is important for your health to stop smoking. Please be aware that second hand smoke is also harmful.

## 2023-03-29 ENCOUNTER — NON-APPOINTMENT (OUTPATIENT)
Age: 78
End: 2023-03-29

## 2023-07-14 NOTE — DISCHARGE NOTE PROVIDER - DATE OF DISCHARGE SERVICE:
Spoke with patient. Informed patient that pharmacy has been given instructions on correct dose and frequency. Patient verbalized understanding and agreeable with plan.   23-Dec-2022

## 2024-01-07 NOTE — DISCHARGE NOTE NURSING/CASE MANAGEMENT/SOCIAL WORK - CAREGIVER ADDRESS
Pt to ED via private vehicle w/rprts of dull chest discomfort w/radiation to left arm and fourth and fifth digit after playing pool. Pt rprts unsure if it's a strain muscle or something more serious. Pt is A&O x4. Breathing easy and unlabored on RA. Placed on cardiac monitor and in gown. EKG obtained. Protocol orders initiated.    [place]

## 2024-02-20 NOTE — PHYSICAL THERAPY INITIAL EVALUATION ADULT - MANUAL MUSCLE TESTING RESULTS, REHAB EVAL
BUE grossly 3/5, BLE grossly 3+/5/grossly assessed due to
GOAL: Patient will complete bed mobility independently (rolling, supine<>sit) (2 weeks)

## 2025-02-11 NOTE — PATIENT PROFILE ADULT - NSPROEXTENSIONSOFSELF_GEN_A_NUR
Goal Outcome Evaluation:                        Problem: Adult Inpatient Plan of Care  Goal: Plan of Care Review  Outcome: Progressing  Goal: Patient-Specific Goal (Individualized)  Outcome: Progressing  Goal: Absence of Hospital-Acquired Illness or Injury  Outcome: Progressing  Intervention: Identify and Manage Fall Risk  Recent Flowsheet Documentation  Taken 2/11/2025 1742 by Herminia Cain RN  Safety Promotion/Fall Prevention:   activity supervised   assistive device/personal items within reach   clutter free environment maintained   safety round/check completed   room organization consistent   nonskid shoes/slippers when out of bed   lighting adjusted   fall prevention program maintained  Taken 2/11/2025 1600 by Herminia Cain RN  Safety Promotion/Fall Prevention:   activity supervised   assistive device/personal items within reach   clutter free environment maintained   safety round/check completed   room organization consistent   nonskid shoes/slippers when out of bed   lighting adjusted  Taken 2/11/2025 1500 by Herminia Cain RN  Safety Promotion/Fall Prevention:   activity supervised   assistive device/personal items within reach   clutter free environment maintained   safety round/check completed   room organization consistent   nonskid shoes/slippers when out of bed   lighting adjusted   fall prevention program maintained  Taken 2/11/2025 1400 by Herminia Cain RN  Safety Promotion/Fall Prevention:   assistive device/personal items within reach   clutter free environment maintained   safety round/check completed   room organization consistent   nonskid shoes/slippers when out of bed   lighting adjusted  Taken 2/11/2025 1011 by Herminia Cain RN  Safety Promotion/Fall Prevention:   activity supervised   safety round/check completed   room organization consistent   nonskid shoes/slippers when out of bed   lighting adjusted   assistive device/personal items within reach   clutter free  environment maintained  Intervention: Prevent Infection  Recent Flowsheet Documentation  Taken 2/11/2025 1742 by Herminia Cain RN  Infection Prevention:   single patient room provided   rest/sleep promoted   personal protective equipment utilized   hand hygiene promoted  Taken 2/11/2025 1600 by Herminia Cain RN  Infection Prevention:   single patient room provided   personal protective equipment utilized   rest/sleep promoted   hand hygiene promoted  Taken 2/11/2025 1500 by Herminia Cain RN  Infection Prevention:   single patient room provided   rest/sleep promoted   personal protective equipment utilized   hand hygiene promoted  Taken 2/11/2025 1400 by Herminia Cain RN  Infection Prevention:   single patient room provided   rest/sleep promoted   personal protective equipment utilized   hand hygiene promoted  Taken 2/11/2025 1011 by Herminia Cain RN  Infection Prevention:   single patient room provided   rest/sleep promoted   personal protective equipment utilized   hand hygiene promoted  Goal: Optimal Comfort and Wellbeing  Outcome: Progressing  Intervention: Monitor Pain and Promote Comfort  Recent Flowsheet Documentation  Taken 2/11/2025 1011 by Herminia Cain RN  Pain Management Interventions:   pillow support provided   position adjusted  Intervention: Provide Person-Centered Care  Recent Flowsheet Documentation  Taken 2/11/2025 1500 by Herminia Cain RN  Trust Relationship/Rapport:   care explained   choices provided  Taken 2/11/2025 1011 by Herminia Cain RN  Trust Relationship/Rapport:   thoughts/feelings acknowledged   reassurance provided   questions encouraged  Goal: Readiness for Transition of Care  Outcome: Progressing  Intervention: Mutually Develop Transition Plan  Recent Flowsheet Documentation  Taken 2/11/2025 1010 by Herminia Cain RN  Transportation Anticipated:   car, drives self   family or friend will provide  Patient/Family Anticipated Services at Transition:  none  Patient/Family Anticipates Transition to: home with family  Taken 2/11/2025 1008 by Herminia Cain, RN  Equipment Currently Used at Home: none                       none